# Patient Record
Sex: FEMALE | Race: WHITE | Employment: UNEMPLOYED | URBAN - METROPOLITAN AREA
[De-identification: names, ages, dates, MRNs, and addresses within clinical notes are randomized per-mention and may not be internally consistent; named-entity substitution may affect disease eponyms.]

---

## 2018-10-12 ENCOUNTER — HOSPITAL ENCOUNTER (OUTPATIENT)
Age: 58
Setting detail: SPECIMEN
Discharge: HOME | End: 2018-10-12

## 2018-10-12 LAB
ALBUMIN SERPL-MCNC: 3.1 G/DL (ref 3.5–5.2)
ALBUMIN/GLOBULIN RATIO: 1 (ref 1–2.5)
ALP BLD-CCNC: 93 U/L (ref 35–104)
ALT SERPL-CCNC: 23 U/L (ref 5–33)
AMMONIA: 45 UMOL/L (ref 11–51)
ANION GAP SERPL CALCULATED.3IONS-SCNC: 14 MMOL/L (ref 9–17)
AST SERPL-CCNC: 22 U/L
BILIRUB SERPL-MCNC: 0.4 MG/DL (ref 0.3–1.2)
BILIRUBIN DIRECT: 0.12 MG/DL
BILIRUBIN, INDIRECT: 0.28 MG/DL (ref 0–1)
BUN BLDV-MCNC: 20 MG/DL (ref 8–23)
BUN/CREAT BLD: ABNORMAL (ref 9–20)
CALCIUM SERPL-MCNC: 8.5 MG/DL (ref 8.6–10.4)
CHLORIDE BLD-SCNC: 109 MMOL/L (ref 98–107)
CO2: 21 MMOL/L (ref 20–31)
CREAT SERPL-MCNC: 1.13 MG/DL (ref 0.5–0.9)
GFR AFRICAN AMERICAN: 60 ML/MIN
GFR NON-AFRICAN AMERICAN: 49 ML/MIN
GFR SERPL CREATININE-BSD FRML MDRD: ABNORMAL ML/MIN/{1.73_M2}
GFR SERPL CREATININE-BSD FRML MDRD: ABNORMAL ML/MIN/{1.73_M2}
GLOBULIN: ABNORMAL G/DL (ref 1.5–3.8)
GLUCOSE BLD-MCNC: 200 MG/DL (ref 70–99)
HCT VFR BLD CALC: 29.6 % (ref 36.3–47.1)
HEMOGLOBIN: 9.6 G/DL (ref 11.9–15.1)
MCH RBC QN AUTO: 32.4 PG (ref 25.2–33.5)
MCHC RBC AUTO-ENTMCNC: 32.4 G/DL (ref 28.4–34.8)
MCV RBC AUTO: 100 FL (ref 82.6–102.9)
NRBC AUTOMATED: 0 PER 100 WBC
PDW BLD-RTO: 13.6 % (ref 11.8–14.4)
PLATELET # BLD: 99 K/UL (ref 138–453)
PMV BLD AUTO: 10.5 FL (ref 8.1–13.5)
POTASSIUM SERPL-SCNC: 4 MMOL/L (ref 3.7–5.3)
PREALBUMIN: 10.4 MG/DL (ref 20–40)
RBC # BLD: 2.96 M/UL (ref 3.95–5.11)
SODIUM BLD-SCNC: 144 MMOL/L (ref 135–144)
T4 TOTAL: 5.5 UG/DL (ref 4.5–12)
TOTAL PROTEIN: 6.1 G/DL (ref 6.4–8.3)
TSH SERPL DL<=0.05 MIU/L-ACNC: 32.96 MIU/L (ref 0.3–5)
VITAMIN D 25-HYDROXY: 37.5 NG/ML (ref 30–100)
WBC # BLD: 4.9 K/UL (ref 3.5–11.3)

## 2018-10-12 PROCEDURE — 82140 ASSAY OF AMMONIA: CPT

## 2018-10-12 PROCEDURE — 36415 COLL VENOUS BLD VENIPUNCTURE: CPT

## 2018-10-12 PROCEDURE — 84443 ASSAY THYROID STIM HORMONE: CPT

## 2018-10-12 PROCEDURE — 85027 COMPLETE CBC AUTOMATED: CPT

## 2018-10-12 PROCEDURE — 84436 ASSAY OF TOTAL THYROXINE: CPT

## 2018-10-12 PROCEDURE — 82306 VITAMIN D 25 HYDROXY: CPT

## 2018-10-12 PROCEDURE — 84134 ASSAY OF PREALBUMIN: CPT

## 2018-10-12 PROCEDURE — 80076 HEPATIC FUNCTION PANEL: CPT

## 2018-10-12 PROCEDURE — P9603 ONE-WAY ALLOW PRORATED MILES: HCPCS

## 2018-10-12 PROCEDURE — 80048 BASIC METABOLIC PNL TOTAL CA: CPT

## 2018-11-01 ENCOUNTER — HOSPITAL ENCOUNTER (OUTPATIENT)
Age: 58
Setting detail: SPECIMEN
Discharge: HOME | End: 2018-11-01

## 2019-06-18 ENCOUNTER — HOSPITAL ENCOUNTER (OUTPATIENT)
Age: 59
Setting detail: SPECIMEN
Discharge: HOME | End: 2019-06-18
Payer: MEDICAID

## 2019-06-18 LAB
ALBUMIN SERPL-MCNC: 4 G/DL (ref 3.5–5.2)
ALBUMIN/GLOBULIN RATIO: 1.1 (ref 1–2.5)
ALP BLD-CCNC: 139 U/L (ref 35–104)
ALT SERPL-CCNC: 17 U/L (ref 5–33)
ANION GAP SERPL CALCULATED.3IONS-SCNC: 16 MMOL/L (ref 9–17)
AST SERPL-CCNC: 18 U/L
BILIRUB SERPL-MCNC: 0.89 MG/DL (ref 0.3–1.2)
BUN BLDV-MCNC: 26 MG/DL (ref 8–23)
BUN/CREAT BLD: ABNORMAL (ref 9–20)
CALCIUM SERPL-MCNC: 8.9 MG/DL (ref 8.6–10.4)
CHLORIDE BLD-SCNC: 104 MMOL/L (ref 98–107)
CHOLESTEROL/HDL RATIO: 4
CHOLESTEROL: 157 MG/DL
CO2: 20 MMOL/L (ref 20–31)
CREAT SERPL-MCNC: 1.44 MG/DL (ref 0.5–0.9)
GFR AFRICAN AMERICAN: 45 ML/MIN
GFR NON-AFRICAN AMERICAN: 37 ML/MIN
GFR SERPL CREATININE-BSD FRML MDRD: ABNORMAL ML/MIN/{1.73_M2}
GFR SERPL CREATININE-BSD FRML MDRD: ABNORMAL ML/MIN/{1.73_M2}
GLUCOSE BLD-MCNC: 292 MG/DL (ref 70–99)
HDLC SERPL-MCNC: 39 MG/DL
LDL CHOLESTEROL: 98 MG/DL (ref 0–130)
POTASSIUM SERPL-SCNC: 4.3 MMOL/L (ref 3.7–5.3)
SODIUM BLD-SCNC: 140 MMOL/L (ref 135–144)
THYROXINE, FREE: 0.92 NG/DL (ref 0.93–1.7)
TOTAL PROTEIN: 7.7 G/DL (ref 6.4–8.3)
TRIGL SERPL-MCNC: 101 MG/DL
TSH SERPL DL<=0.05 MIU/L-ACNC: 54.23 MIU/L (ref 0.3–5)
VLDLC SERPL CALC-MCNC: ABNORMAL MG/DL (ref 1–30)

## 2019-06-19 LAB
ABSOLUTE EOS #: 0.12 K/UL (ref 0–0.4)
ABSOLUTE IMMATURE GRANULOCYTE: 0 K/UL (ref 0–0.3)
ABSOLUTE LYMPH #: 0.37 K/UL (ref 1–4.8)
ABSOLUTE MONO #: 0.62 K/UL (ref 0.1–0.8)
BASOPHILS # BLD: 0 % (ref 0–2)
BASOPHILS ABSOLUTE: 0 K/UL (ref 0–0.2)
DIFFERENTIAL TYPE: ABNORMAL
EOSINOPHILS RELATIVE PERCENT: 2 % (ref 1–4)
ESTIMATED AVERAGE GLUCOSE: 240 MG/DL
HBA1C MFR BLD: 10 % (ref 4–6)
HCT VFR BLD CALC: 39.2 % (ref 36.3–47.1)
HEMOGLOBIN: 12.2 G/DL (ref 11.9–15.1)
IMMATURE GRANULOCYTES: 0 %
LYMPHOCYTES # BLD: 6 % (ref 24–44)
MCH RBC QN AUTO: 31.8 PG (ref 25.2–33.5)
MCHC RBC AUTO-ENTMCNC: 31.1 G/DL (ref 28.4–34.8)
MCV RBC AUTO: 102.1 FL (ref 82.6–102.9)
MONOCYTES # BLD: 10 % (ref 1–7)
MORPHOLOGY: ABNORMAL
NRBC AUTOMATED: 0 PER 100 WBC
PDW BLD-RTO: 14.6 % (ref 11.8–14.4)
PLATELET # BLD: 108 K/UL (ref 138–453)
PLATELET ESTIMATE: ABNORMAL
PMV BLD AUTO: 10.9 FL (ref 8.1–13.5)
RBC # BLD: 3.84 M/UL (ref 3.95–5.11)
RBC # BLD: ABNORMAL 10*6/UL
SEG NEUTROPHILS: 82 % (ref 36–66)
SEGMENTED NEUTROPHILS ABSOLUTE COUNT: 5.09 K/UL (ref 1.8–7.7)
WBC # BLD: 6.2 K/UL (ref 3.5–11.3)
WBC # BLD: ABNORMAL 10*3/UL

## 2019-06-26 PROBLEM — K70.31 ALCOHOLIC CIRRHOSIS OF LIVER WITH ASCITES (HCC): Status: ACTIVE | Noted: 2019-05-16

## 2019-06-26 PROBLEM — E78.2 MIXED HYPERLIPIDEMIA: Status: ACTIVE | Noted: 2019-05-16

## 2019-06-26 PROBLEM — N18.30 STAGE 3 CHRONIC KIDNEY DISEASE (HCC): Status: ACTIVE | Noted: 2019-05-16

## 2019-06-26 PROBLEM — E11.22 TYPE 2 DIABETES MELLITUS WITH STAGE 3 CHRONIC KIDNEY DISEASE, WITHOUT LONG-TERM CURRENT USE OF INSULIN (HCC): Status: ACTIVE | Noted: 2019-05-16

## 2019-06-26 PROBLEM — Z72.0 TOBACCO ABUSE: Status: ACTIVE | Noted: 2019-05-16

## 2019-06-26 PROBLEM — E03.9 ACQUIRED HYPOTHYROIDISM: Status: ACTIVE | Noted: 2019-05-16

## 2019-06-26 PROBLEM — J44.9 COPD (CHRONIC OBSTRUCTIVE PULMONARY DISEASE) (HCC): Status: ACTIVE | Noted: 2019-05-16

## 2019-06-26 PROBLEM — N18.30 TYPE 2 DIABETES MELLITUS WITH STAGE 3 CHRONIC KIDNEY DISEASE, WITHOUT LONG-TERM CURRENT USE OF INSULIN (HCC): Status: ACTIVE | Noted: 2019-05-16

## 2019-07-01 RX ORDER — SIMVASTATIN 40 MG
40 TABLET ORAL NIGHTLY
COMMUNITY
Start: 2019-05-17 | End: 2019-11-22

## 2019-07-01 RX ORDER — LEVOTHYROXINE SODIUM 175 UG/1
175 TABLET ORAL DAILY
Status: ON HOLD | COMMUNITY
End: 2019-08-29 | Stop reason: HOSPADM

## 2019-07-01 RX ORDER — PANTOPRAZOLE SODIUM 40 MG/1
40 TABLET, DELAYED RELEASE ORAL DAILY
COMMUNITY
End: 2019-07-03

## 2019-07-01 RX ORDER — LISINOPRIL 2.5 MG/1
2.5 TABLET ORAL PRN
COMMUNITY
Start: 2019-05-17 | End: 2019-07-03

## 2019-07-01 RX ORDER — GLIMEPIRIDE 1 MG/1
2 TABLET ORAL 2 TIMES DAILY
Status: ON HOLD | COMMUNITY
Start: 2019-05-17 | End: 2019-09-17 | Stop reason: DRUGHIGH

## 2019-07-03 ENCOUNTER — OFFICE VISIT (OUTPATIENT)
Dept: NEPHROLOGY | Age: 59
End: 2019-07-03
Payer: MEDICAID

## 2019-07-03 VITALS
DIASTOLIC BLOOD PRESSURE: 68 MMHG | WEIGHT: 242 LBS | HEART RATE: 89 BPM | SYSTOLIC BLOOD PRESSURE: 102 MMHG | OXYGEN SATURATION: 99 %

## 2019-07-03 DIAGNOSIS — E11.21 DIABETIC NEPHROPATHY WITH PROTEINURIA (HCC): ICD-10-CM

## 2019-07-03 DIAGNOSIS — N18.30 CKD (CHRONIC KIDNEY DISEASE) STAGE 3, GFR 30-59 ML/MIN (HCC): ICD-10-CM

## 2019-07-03 DIAGNOSIS — I10 HTN (HYPERTENSION), BENIGN: Primary | ICD-10-CM

## 2019-07-03 PROCEDURE — 4004F PT TOBACCO SCREEN RCVD TLK: CPT | Performed by: INTERNAL MEDICINE

## 2019-07-03 PROCEDURE — 2022F DILAT RTA XM EVC RTNOPTHY: CPT | Performed by: INTERNAL MEDICINE

## 2019-07-03 PROCEDURE — G8428 CUR MEDS NOT DOCUMENT: HCPCS | Performed by: INTERNAL MEDICINE

## 2019-07-03 PROCEDURE — 3017F COLORECTAL CA SCREEN DOC REV: CPT | Performed by: INTERNAL MEDICINE

## 2019-07-03 PROCEDURE — 99204 OFFICE O/P NEW MOD 45 MIN: CPT | Performed by: INTERNAL MEDICINE

## 2019-07-03 PROCEDURE — 3046F HEMOGLOBIN A1C LEVEL >9.0%: CPT | Performed by: INTERNAL MEDICINE

## 2019-07-03 PROCEDURE — G8421 BMI NOT CALCULATED: HCPCS | Performed by: INTERNAL MEDICINE

## 2019-07-03 RX ORDER — BACLOFEN 20 MG/1
20 TABLET ORAL NIGHTLY
Status: ON HOLD | COMMUNITY
End: 2019-08-05 | Stop reason: HOSPADM

## 2019-07-03 ASSESSMENT — ENCOUNTER SYMPTOMS
DIARRHEA: 0
COUGH: 0
EYE PAIN: 0
EYES NEGATIVE: 1
ABDOMINAL PAIN: 0
SORE THROAT: 0
BACK PAIN: 0
SHORTNESS OF BREATH: 0
VOMITING: 0
NAUSEA: 0

## 2019-07-03 NOTE — PROGRESS NOTES
[facility]          [facility]        [address]        [facility]        [phone]           Outpatient Initial consult note         7/3/2019 11:51 AM    Patient Name:   Cam Marino:    1960  Primary Care Physician:  Rigo Morton PA-C     Chief Complaint : Evaluation of  CKD Management     History of presenting illness :Renato Stoddard is a 59 y.o.   female with Past Medical History as stated below was sent / referred by Rigo Morton PA-C forevaluation of the above problem. Patient has no history of hypertension for 0 years. Patient has history of diabetes mellitus, with retinopathy & neuropathy and for 35 years. The patient denies history of renal stones anddenies NSAID use. Patient has no history of proteinuria. Patient Never had a kidney biopsy done. Patient does not use Herbal remedies/vitamin supplements. Patient denies frequency, hematuria, hesitancy. Patient has no family history of kidney disease. Patient's chronic medical conditions of chronic kidney disease, cirrhosis, diabetic neuropathy are stable and well controlled with medications at this time    Patient has a history of chronic kidney disease stage III and used to see a nephrologist in [facility] now patient has moved here and establishing care with me.      Past History :     Past Medical History:   Diagnosis Date    Chronic kidney disease     Diabetes mellitus ([address]     Heart abnormality     Hypertension      Past Surgical History:   Procedure Laterality Date     SECTION      HYSTERECTOMY       Social History     Socioeconomic History    Marital status: Unknown     Spouse name: Not on file    Number of children: Not on file    Years of education: Not on file    Highest education level: Not on file   Occupational History    Not on file   Social Needs    Financial resource strain: Not on file    Food insecurity:     Worry: Microalbumin / Creatinine Urine Ratio    Protein / creatinine ratio, urine       Wiliam Mcdaniels M.D  [facility].

## 2019-08-01 ENCOUNTER — HOSPITAL ENCOUNTER (INPATIENT)
Age: 59
LOS: 4 days | Discharge: HOME | DRG: 280 | End: 2019-08-05
Attending: HOSPITALIST | Admitting: HOSPITALIST
Payer: MEDICAID

## 2019-08-01 DIAGNOSIS — K70.31 ALCOHOLIC CIRRHOSIS OF LIVER WITH ASCITES (HCC): Primary | ICD-10-CM

## 2019-08-01 PROBLEM — R18.8 ASCITES: Status: ACTIVE | Noted: 2019-08-01

## 2019-08-01 PROCEDURE — 1200000000 HC SEMI PRIVATE

## 2019-08-01 ASSESSMENT — PAIN DESCRIPTION - DESCRIPTORS: DESCRIPTORS: DULL

## 2019-08-01 ASSESSMENT — PAIN SCALES - GENERAL: PAINLEVEL_OUTOF10: 6

## 2019-08-01 ASSESSMENT — PAIN DESCRIPTION - ONSET: ONSET: ON-GOING

## 2019-08-01 ASSESSMENT — PAIN DESCRIPTION - LOCATION: LOCATION: ABDOMEN

## 2019-08-01 ASSESSMENT — PAIN DESCRIPTION - PAIN TYPE: TYPE: ACUTE PAIN

## 2019-08-01 ASSESSMENT — PAIN - FUNCTIONAL ASSESSMENT: PAIN_FUNCTIONAL_ASSESSMENT: PREVENTS OR INTERFERES SOME ACTIVE ACTIVITIES AND ADLS

## 2019-08-01 ASSESSMENT — PAIN DESCRIPTION - DIRECTION: RADIATING_TOWARDS: UPPER ABDOMEN

## 2019-08-01 ASSESSMENT — PAIN DESCRIPTION - FREQUENCY: FREQUENCY: INTERMITTENT

## 2019-08-01 ASSESSMENT — PAIN DESCRIPTION - ORIENTATION: ORIENTATION: LEFT

## 2019-08-02 ENCOUNTER — APPOINTMENT (OUTPATIENT)
Dept: ULTRASOUND IMAGING | Age: 59
DRG: 280 | End: 2019-08-02
Attending: HOSPITALIST
Payer: MEDICAID

## 2019-08-02 LAB
ABSOLUTE RETIC #: 83 THOU/MM3 (ref 20–115)
AFP-TUMOR MARKER: 2.9 UG/L
ALBUMIN FLUID: 1.3 GM/DL
ALBUMIN SERPL-MCNC: 3.1 G/DL (ref 3.5–5.1)
ALBUMIN SERPL-MCNC: 3.2 G/DL (ref 3.5–5.1)
ALP BLD-CCNC: 102 U/L (ref 38–126)
ALP BLD-CCNC: 99 U/L (ref 38–126)
ALT SERPL-CCNC: 8 U/L (ref 11–66)
ALT SERPL-CCNC: 9 U/L (ref 11–66)
AMYLASE FLUID: 9 U/L
ANION GAP SERPL CALCULATED.3IONS-SCNC: 11 MEQ/L (ref 8–16)
ANION GAP SERPL CALCULATED.3IONS-SCNC: 12 MEQ/L (ref 8–16)
APTT: 30.6 SECONDS (ref 22–38)
AST SERPL-CCNC: 11 U/L (ref 5–40)
AST SERPL-CCNC: 11 U/L (ref 5–40)
BACTERIA: ABNORMAL /HPF
BASOPHILS # BLD: 0.7 %
BASOPHILS # BLD: 0.7 %
BASOPHILS ABSOLUTE: 0 THOU/MM3 (ref 0–0.1)
BASOPHILS ABSOLUTE: 0 THOU/MM3 (ref 0–0.1)
BILIRUB SERPL-MCNC: 0.5 MG/DL (ref 0.3–1.2)
BILIRUB SERPL-MCNC: 0.6 MG/DL (ref 0.3–1.2)
BILIRUBIN URINE: NEGATIVE
BLOOD, URINE: NEGATIVE
BODY FLUID RBC: < 2000 /CUMM
BUN BLDV-MCNC: 24 MG/DL (ref 7–22)
BUN BLDV-MCNC: 24 MG/DL (ref 7–22)
CALCIUM SERPL-MCNC: 8.7 MG/DL (ref 8.5–10.5)
CALCIUM SERPL-MCNC: 8.7 MG/DL (ref 8.5–10.5)
CASTS 2: ABNORMAL /LPF
CASTS UA: ABNORMAL /LPF
CHARACTER, BODY FLUID: NORMAL
CHARACTER, URINE: ABNORMAL
CHLORIDE BLD-SCNC: 105 MEQ/L (ref 98–111)
CHLORIDE BLD-SCNC: 107 MEQ/L (ref 98–111)
CO2: 22 MEQ/L (ref 23–33)
CO2: 22 MEQ/L (ref 23–33)
COLOR: ABNORMAL
COLOR: YELLOW
CREAT SERPL-MCNC: 1.4 MG/DL (ref 0.4–1.2)
CREAT SERPL-MCNC: 1.6 MG/DL (ref 0.4–1.2)
CRYSTALS, UA: ABNORMAL
EOSINOPHIL # BLD: 0 %
EOSINOPHIL # BLD: 1.2 %
EOSINOPHILS ABSOLUTE: 0 THOU/MM3 (ref 0–0.4)
EOSINOPHILS ABSOLUTE: 0.1 THOU/MM3 (ref 0–0.4)
EPITHELIAL CELLS, UA: ABNORMAL /HPF
ERYTHROCYTE [DISTWIDTH] IN BLOOD BY AUTOMATED COUNT: 14.1 % (ref 11.5–14.5)
ERYTHROCYTE [DISTWIDTH] IN BLOOD BY AUTOMATED COUNT: 14.1 % (ref 11.5–14.5)
ERYTHROCYTE [DISTWIDTH] IN BLOOD BY AUTOMATED COUNT: 52.6 FL (ref 35–45)
ERYTHROCYTE [DISTWIDTH] IN BLOOD BY AUTOMATED COUNT: 52.9 FL (ref 35–45)
FERRITIN: 81 NG/ML (ref 10–291)
FOLATE: 9.5 NG/ML (ref 4.8–24.2)
GFR SERPL CREATININE-BSD FRML MDRD: 33 ML/MIN/1.73M2
GFR SERPL CREATININE-BSD FRML MDRD: 38 ML/MIN/1.73M2
GLUCOSE BLD-MCNC: 201 MG/DL (ref 70–108)
GLUCOSE BLD-MCNC: 211 MG/DL (ref 70–108)
GLUCOSE URINE: NEGATIVE MG/DL
GLUCOSE, FLUID: 221 MG/DL
HAV IGM SER IA-ACNC: NEGATIVE
HCT VFR BLD CALC: 33.3 % (ref 37–47)
HCT VFR BLD CALC: 33.7 % (ref 37–47)
HEMOGLOBIN: 10.6 GM/DL (ref 12–16)
HEMOGLOBIN: 10.7 GM/DL (ref 12–16)
HEPATITIS B CORE IGM ANTIBODY: NEGATIVE
HEPATITIS B SURFACE ANTIGEN: NEGATIVE
HEPATITIS C ANTIBODY: NEGATIVE
IGG: 1487 MG/DL (ref 700–1600)
IMMATURE GRANS (ABS): 0.01 THOU/MM3 (ref 0–0.07)
IMMATURE GRANS (ABS): 0.01 THOU/MM3 (ref 0–0.07)
IMMATURE GRANULOCYTES: 0.2 %
IMMATURE GRANULOCYTES: 0.2 %
IMMATURE RETIC FRACT: 15.1 % (ref 3–15.9)
INR BLD: 1.18 (ref 0.85–1.13)
IRON SATURATION: 28 % (ref 20–50)
IRON: 56 UG/DL (ref 50–170)
KETONES, URINE: NEGATIVE
LD, FLUID: 79 U/L
LEUKOCYTE ESTERASE, URINE: ABNORMAL
LYMPHOCYTES # BLD: 10.4 %
LYMPHOCYTES # BLD: 9.8 %
LYMPHOCYTES ABSOLUTE: 0.4 THOU/MM3 (ref 1–4.8)
LYMPHOCYTES ABSOLUTE: 0.5 THOU/MM3 (ref 1–4.8)
MCH RBC QN AUTO: 32.4 PG (ref 26–33)
MCH RBC QN AUTO: 32.6 PG (ref 26–33)
MCHC RBC AUTO-ENTMCNC: 31.8 GM/DL (ref 32.2–35.5)
MCHC RBC AUTO-ENTMCNC: 31.8 GM/DL (ref 32.2–35.5)
MCV RBC AUTO: 102.1 FL (ref 81–99)
MCV RBC AUTO: 102.5 FL (ref 81–99)
MISCELLANEOUS 2: ABNORMAL
MONOCYTES # BLD: 10.5 %
MONOCYTES # BLD: 9.7 %
MONOCYTES ABSOLUTE: 0.4 THOU/MM3 (ref 0.4–1.3)
MONOCYTES ABSOLUTE: 0.5 THOU/MM3 (ref 0.4–1.3)
MONONUCLEAR CELLS BODY FLUID: 89.7 %
NITRITE, URINE: POSITIVE
NUCLEATED RED BLOOD CELLS: 0 /100 WBC
NUCLEATED RED BLOOD CELLS: 0 /100 WBC
PATHOLOGIST REVIEW: NORMAL
PH UA: 8.5 (ref 5–9)
PLATELET # BLD: 86 THOU/MM3 (ref 130–400)
PLATELET # BLD: 90 THOU/MM3 (ref 130–400)
PMV BLD AUTO: 9.8 FL (ref 9.4–12.4)
PMV BLD AUTO: 9.8 FL (ref 9.4–12.4)
POLYMORPHONUCLEAR CELLS BODY FLUID: 10.3 %
POTASSIUM REFLEX MAGNESIUM: 4.3 MEQ/L (ref 3.5–5.2)
POTASSIUM SERPL-SCNC: 4.2 MEQ/L (ref 3.5–5.2)
PROTEIN FLUID: 2.3 GM/DL
PROTEIN UA: 100
RBC # BLD: 3.25 MILL/MM3 (ref 4.2–5.4)
RBC # BLD: 3.3 MILL/MM3 (ref 4.2–5.4)
RBC URINE: ABNORMAL /HPF
RENAL EPITHELIAL, UA: ABNORMAL
RETIC HEMOGLOBIN: 36.7 PG (ref 28.2–35.7)
RETICULOCYTE ABSOLUTE COUNT: 2.5 % (ref 0.5–2)
SCAN OF BLOOD SMEAR: NORMAL
SEG NEUTROPHILS: 77.8 %
SEG NEUTROPHILS: 78.8 %
SEGMENTED NEUTROPHILS ABSOLUTE COUNT: 3.3 THOU/MM3 (ref 1.8–7.7)
SEGMENTED NEUTROPHILS ABSOLUTE COUNT: 3.6 THOU/MM3 (ref 1.8–7.7)
SODIUM BLD-SCNC: 138 MEQ/L (ref 135–145)
SODIUM BLD-SCNC: 141 MEQ/L (ref 135–145)
SPECIFIC GRAVITY, URINE: 1.02 (ref 1–1.03)
SPECIMEN: NORMAL
TOTAL IRON BINDING CAPACITY: 199 UG/DL (ref 171–450)
TOTAL NUCLEATED CELLS BODY FLUID: 107 /CUMM (ref 0–500)
TOTAL PROTEIN: 6.4 G/DL (ref 6.1–8)
TOTAL PROTEIN: 6.6 G/DL (ref 6.1–8)
TOTAL VOLUME RECEIVED BODY FLUID: 80 ML
UROBILINOGEN, URINE: 1 EU/DL (ref 0–1)
VITAMIN B-12: 767 PG/ML (ref 211–911)
WBC # BLD: 4.2 THOU/MM3 (ref 4.8–10.8)
WBC # BLD: 4.6 THOU/MM3 (ref 4.8–10.8)
WBC UA: > 100 /HPF
YEAST: ABNORMAL

## 2019-08-02 PROCEDURE — 6370000000 HC RX 637 (ALT 250 FOR IP): Performed by: PHYSICIAN ASSISTANT

## 2019-08-02 PROCEDURE — 87086 URINE CULTURE/COLONY COUNT: CPT

## 2019-08-02 PROCEDURE — 99223 1ST HOSP IP/OBS HIGH 75: CPT | Performed by: PHYSICIAN ASSISTANT

## 2019-08-02 PROCEDURE — 85046 RETICYTE/HGB CONCENTRATE: CPT

## 2019-08-02 PROCEDURE — 82607 VITAMIN B-12: CPT

## 2019-08-02 PROCEDURE — 89050 BODY FLUID CELL COUNT: CPT

## 2019-08-02 PROCEDURE — 83516 IMMUNOASSAY NONANTIBODY: CPT

## 2019-08-02 PROCEDURE — 76705 ECHO EXAM OF ABDOMEN: CPT

## 2019-08-02 PROCEDURE — 82784 ASSAY IGA/IGD/IGG/IGM EACH: CPT

## 2019-08-02 PROCEDURE — 86038 ANTINUCLEAR ANTIBODIES: CPT

## 2019-08-02 PROCEDURE — 49083 ABD PARACENTESIS W/IMAGING: CPT

## 2019-08-02 PROCEDURE — 88305 TISSUE EXAM BY PATHOLOGIST: CPT

## 2019-08-02 PROCEDURE — 82945 GLUCOSE OTHER FLUID: CPT

## 2019-08-02 PROCEDURE — 0W9G3ZZ DRAINAGE OF PERITONEAL CAVITY, PERCUTANEOUS APPROACH: ICD-10-PCS | Performed by: RADIOLOGY

## 2019-08-02 PROCEDURE — 82390 ASSAY OF CERULOPLASMIN: CPT

## 2019-08-02 PROCEDURE — 80074 ACUTE HEPATITIS PANEL: CPT

## 2019-08-02 PROCEDURE — 82042 OTHER SOURCE ALBUMIN QUAN EA: CPT

## 2019-08-02 PROCEDURE — 87186 SC STD MICRODIL/AGAR DIL: CPT

## 2019-08-02 PROCEDURE — 87075 CULTR BACTERIA EXCEPT BLOOD: CPT

## 2019-08-02 PROCEDURE — 82105 ALPHA-FETOPROTEIN SERUM: CPT

## 2019-08-02 PROCEDURE — 83615 LACTATE (LD) (LDH) ENZYME: CPT

## 2019-08-02 PROCEDURE — 87070 CULTURE OTHR SPECIMN AEROBIC: CPT

## 2019-08-02 PROCEDURE — 85730 THROMBOPLASTIN TIME PARTIAL: CPT

## 2019-08-02 PROCEDURE — 2709999900 HC NON-CHARGEABLE SUPPLY

## 2019-08-02 PROCEDURE — 82746 ASSAY OF FOLIC ACID SERUM: CPT

## 2019-08-02 PROCEDURE — 82728 ASSAY OF FERRITIN: CPT

## 2019-08-02 PROCEDURE — 85610 PROTHROMBIN TIME: CPT

## 2019-08-02 PROCEDURE — 82150 ASSAY OF AMYLASE: CPT

## 2019-08-02 PROCEDURE — 84157 ASSAY OF PROTEIN OTHER: CPT

## 2019-08-02 PROCEDURE — 6360000002 HC RX W HCPCS: Performed by: INTERNAL MEDICINE

## 2019-08-02 PROCEDURE — 2500000003 HC RX 250 WO HCPCS: Performed by: PHYSICIAN ASSISTANT

## 2019-08-02 PROCEDURE — P9047 ALBUMIN (HUMAN), 25%, 50ML: HCPCS | Performed by: INTERNAL MEDICINE

## 2019-08-02 PROCEDURE — 86255 FLUORESCENT ANTIBODY SCREEN: CPT

## 2019-08-02 PROCEDURE — 2580000003 HC RX 258: Performed by: PHYSICIAN ASSISTANT

## 2019-08-02 PROCEDURE — 80053 COMPREHEN METABOLIC PANEL: CPT

## 2019-08-02 PROCEDURE — 87077 CULTURE AEROBIC IDENTIFY: CPT

## 2019-08-02 PROCEDURE — 88112 CYTOPATH CELL ENHANCE TECH: CPT

## 2019-08-02 PROCEDURE — 81001 URINALYSIS AUTO W/SCOPE: CPT

## 2019-08-02 PROCEDURE — 6360000002 HC RX W HCPCS: Performed by: PHYSICIAN ASSISTANT

## 2019-08-02 PROCEDURE — 1200000000 HC SEMI PRIVATE

## 2019-08-02 PROCEDURE — 87205 SMEAR GRAM STAIN: CPT

## 2019-08-02 PROCEDURE — 83550 IRON BINDING TEST: CPT

## 2019-08-02 PROCEDURE — 83540 ASSAY OF IRON: CPT

## 2019-08-02 PROCEDURE — 82103 ALPHA-1-ANTITRYPSIN TOTAL: CPT

## 2019-08-02 PROCEDURE — 36415 COLL VENOUS BLD VENIPUNCTURE: CPT

## 2019-08-02 PROCEDURE — 85025 COMPLETE CBC W/AUTO DIFF WBC: CPT

## 2019-08-02 RX ORDER — LORAZEPAM 2 MG/ML
4 INJECTION INTRAMUSCULAR
Status: DISCONTINUED | OUTPATIENT
Start: 2019-08-02 | End: 2019-08-05 | Stop reason: HOSPADM

## 2019-08-02 RX ORDER — GLIMEPIRIDE 2 MG/1
2 TABLET ORAL 2 TIMES DAILY WITH MEALS
Status: DISCONTINUED | OUTPATIENT
Start: 2019-08-02 | End: 2019-08-03

## 2019-08-02 RX ORDER — SODIUM CHLORIDE 9 MG/ML
INJECTION, SOLUTION INTRAVENOUS CONTINUOUS
Status: DISCONTINUED | OUTPATIENT
Start: 2019-08-02 | End: 2019-08-03

## 2019-08-02 RX ORDER — POTASSIUM CHLORIDE 7.45 MG/ML
10 INJECTION INTRAVENOUS PRN
Status: DISCONTINUED | OUTPATIENT
Start: 2019-08-02 | End: 2019-08-05 | Stop reason: HOSPADM

## 2019-08-02 RX ORDER — LORAZEPAM 2 MG/1
4 TABLET ORAL
Status: DISCONTINUED | OUTPATIENT
Start: 2019-08-02 | End: 2019-08-05 | Stop reason: HOSPADM

## 2019-08-02 RX ORDER — NICOTINE POLACRILEX 4 MG
15 LOZENGE BUCCAL PRN
Status: DISCONTINUED | OUTPATIENT
Start: 2019-08-02 | End: 2019-08-05 | Stop reason: HOSPADM

## 2019-08-02 RX ORDER — ALBUMIN (HUMAN) 12.5 G/50ML
25 SOLUTION INTRAVENOUS ONCE
Status: COMPLETED | OUTPATIENT
Start: 2019-08-02 | End: 2019-08-02

## 2019-08-02 RX ORDER — LORAZEPAM 2 MG/ML
1 INJECTION INTRAMUSCULAR
Status: DISCONTINUED | OUTPATIENT
Start: 2019-08-02 | End: 2019-08-05 | Stop reason: HOSPADM

## 2019-08-02 RX ORDER — LEVOTHYROXINE SODIUM 0.1 MG/1
200 TABLET ORAL
Status: DISCONTINUED | OUTPATIENT
Start: 2019-08-02 | End: 2019-08-05 | Stop reason: HOSPADM

## 2019-08-02 RX ORDER — NICOTINE 21 MG/24HR
1 PATCH, TRANSDERMAL 24 HOURS TRANSDERMAL DAILY
Status: DISCONTINUED | OUTPATIENT
Start: 2019-08-02 | End: 2019-08-05 | Stop reason: HOSPADM

## 2019-08-02 RX ORDER — BACLOFEN 10 MG/1
20 TABLET ORAL NIGHTLY
Status: DISCONTINUED | OUTPATIENT
Start: 2019-08-02 | End: 2019-08-05 | Stop reason: HOSPADM

## 2019-08-02 RX ORDER — FENTANYL CITRATE 50 UG/ML
50 INJECTION, SOLUTION INTRAMUSCULAR; INTRAVENOUS
Status: DISCONTINUED | OUTPATIENT
Start: 2019-08-02 | End: 2019-08-05 | Stop reason: HOSPADM

## 2019-08-02 RX ORDER — LORAZEPAM 2 MG/ML
2 INJECTION INTRAMUSCULAR
Status: DISCONTINUED | OUTPATIENT
Start: 2019-08-02 | End: 2019-08-05 | Stop reason: HOSPADM

## 2019-08-02 RX ORDER — LORAZEPAM 2 MG/ML
3 INJECTION INTRAMUSCULAR
Status: DISCONTINUED | OUTPATIENT
Start: 2019-08-02 | End: 2019-08-05 | Stop reason: HOSPADM

## 2019-08-02 RX ORDER — DEXTROSE MONOHYDRATE 25 G/50ML
12.5 INJECTION, SOLUTION INTRAVENOUS PRN
Status: DISCONTINUED | OUTPATIENT
Start: 2019-08-02 | End: 2019-08-05 | Stop reason: HOSPADM

## 2019-08-02 RX ORDER — LORAZEPAM 1 MG/1
1 TABLET ORAL
Status: DISCONTINUED | OUTPATIENT
Start: 2019-08-02 | End: 2019-08-05 | Stop reason: HOSPADM

## 2019-08-02 RX ORDER — ONDANSETRON 2 MG/ML
4 INJECTION INTRAMUSCULAR; INTRAVENOUS EVERY 6 HOURS PRN
Status: DISCONTINUED | OUTPATIENT
Start: 2019-08-02 | End: 2019-08-05 | Stop reason: HOSPADM

## 2019-08-02 RX ORDER — POTASSIUM CHLORIDE 20 MEQ/1
40 TABLET, EXTENDED RELEASE ORAL PRN
Status: DISCONTINUED | OUTPATIENT
Start: 2019-08-02 | End: 2019-08-05 | Stop reason: HOSPADM

## 2019-08-02 RX ORDER — LORAZEPAM 2 MG/1
2 TABLET ORAL
Status: DISCONTINUED | OUTPATIENT
Start: 2019-08-02 | End: 2019-08-05 | Stop reason: HOSPADM

## 2019-08-02 RX ORDER — SIMVASTATIN 40 MG
40 TABLET ORAL NIGHTLY
Status: DISCONTINUED | OUTPATIENT
Start: 2019-08-02 | End: 2019-08-05 | Stop reason: HOSPADM

## 2019-08-02 RX ORDER — SODIUM CHLORIDE 0.9 % (FLUSH) 0.9 %
10 SYRINGE (ML) INJECTION PRN
Status: DISCONTINUED | OUTPATIENT
Start: 2019-08-02 | End: 2019-08-05 | Stop reason: HOSPADM

## 2019-08-02 RX ORDER — SODIUM CHLORIDE 0.9 % (FLUSH) 0.9 %
10 SYRINGE (ML) INJECTION EVERY 12 HOURS SCHEDULED
Status: DISCONTINUED | OUTPATIENT
Start: 2019-08-02 | End: 2019-08-05 | Stop reason: HOSPADM

## 2019-08-02 RX ORDER — DEXTROSE MONOHYDRATE 50 MG/ML
100 INJECTION, SOLUTION INTRAVENOUS PRN
Status: DISCONTINUED | OUTPATIENT
Start: 2019-08-02 | End: 2019-08-05 | Stop reason: HOSPADM

## 2019-08-02 RX ADMIN — LEVOTHYROXINE SODIUM 200 MCG: 100 TABLET ORAL at 06:00

## 2019-08-02 RX ADMIN — SODIUM CHLORIDE: 9 INJECTION, SOLUTION INTRAVENOUS at 04:07

## 2019-08-02 RX ADMIN — SODIUM CHLORIDE: 9 INJECTION, SOLUTION INTRAVENOUS at 22:10

## 2019-08-02 RX ADMIN — SIMVASTATIN 40 MG: 40 TABLET, FILM COATED ORAL at 20:28

## 2019-08-02 RX ADMIN — GLIMEPIRIDE 2 MG: 2 TABLET ORAL at 07:57

## 2019-08-02 RX ADMIN — ALBUMIN (HUMAN) 25 G: 0.25 INJECTION, SOLUTION INTRAVENOUS at 15:52

## 2019-08-02 RX ADMIN — SERTRALINE 50 MG: 50 TABLET, FILM COATED ORAL at 07:57

## 2019-08-02 RX ADMIN — Medication 10 ML: at 22:11

## 2019-08-02 ASSESSMENT — PAIN SCALES - GENERAL
PAINLEVEL_OUTOF10: 0
PAINLEVEL_OUTOF10: 2
PAINLEVEL_OUTOF10: 0

## 2019-08-02 ASSESSMENT — PAIN DESCRIPTION - FREQUENCY: FREQUENCY: INTERMITTENT

## 2019-08-02 ASSESSMENT — ENCOUNTER SYMPTOMS
VOMITING: 0
ABDOMINAL DISTENTION: 1
ABDOMINAL PAIN: 1
ALLERGIC/IMMUNOLOGIC NEGATIVE: 1
NAUSEA: 0
RESPIRATORY NEGATIVE: 1
EYES NEGATIVE: 1

## 2019-08-02 ASSESSMENT — PAIN DESCRIPTION - ONSET: ONSET: ON-GOING

## 2019-08-02 ASSESSMENT — PAIN DESCRIPTION - PAIN TYPE: TYPE: ACUTE PAIN

## 2019-08-02 ASSESSMENT — PAIN DESCRIPTION - LOCATION: LOCATION: BACK

## 2019-08-02 ASSESSMENT — PAIN DESCRIPTION - ORIENTATION: ORIENTATION: LOWER

## 2019-08-02 ASSESSMENT — PAIN DESCRIPTION - DESCRIPTORS: DESCRIPTORS: ACHING

## 2019-08-02 NOTE — CONSULTS
violence:     Fear of current or ex partner: Not on file     Emotionally abused: Not on file     Physically abused: Not on file     Forced sexual activity: Not on file   Other Topics Concern    Not on file   Social History Narrative    Not on file     Family History:   No GI malignancies    ROS:  GENERAL: 30 lb weight gain. Denies fever, chills. NEURO: Denies numbness or tingling in extremities. Denies headache. Anabella Fredy CARDIOVASCULAR: Dyspnea at rest. Denies chest pain or palpitations. RESPIRATORY: Cough. Denies Hemoptysis. GI: Admits to a single episode of hematochezia 6 weeks ago. +Abdominal pain and distension. Denies constipation or N/V/D. Denies jaundice. : Denies dysuria or hematuria. DERM: Denies rashes or bruises    Physical Exam:  VITALS:   /70   Pulse 82   Temp 97.3 °F (36.3 °C) (Oral)   Resp 20   Ht 5' 4\" (1.626 m)   Wt 262 lb 3.2 oz (118.9 kg)   SpO2 95%   BMI 45.01 kg/m²      The patient is a 62 y.o.  female with Body mass index is 45.01 kg/m². in no acute distress. HEAD: Normocephalic. Atraumatic. NECK: Thyroid nonpalpable. Neck is nontender with no signs of lymphadenopathy. CHEST: End expiratory wheezing bilaterally. No rales or rhonchi. CARDIOVASCULAR: RRR. No murmurs, gallops, or rubs. No JVD   ABDOMEN: Distended with palpable spleen. Tender and firm abdomen. BS present x 4. DERM: No jaundice, bruising, pallor, or rashes  NEURO: Cranial nerves are grossly intact. Alert and oriented to person, place, and time. Assessment:  1. Ascites   2. Macrocytic Anemia  3. Hypoalbuminemia  4. CKD Stage III    Plan:  1. Abdominal Paracentesis (ultrasound guided)  2. AFP to look for evidence of malignancy  3. Ultrasound of liver to look for thrombus or tumor  3. Iron study, B12, and folate   4. U/A        Electronically signed by Niurka Burgess on 8/2/2019 at 10:17 AM    **This is a Medical/ PA/ APRN Student Note and is charted for educational purposes.  The non-physician

## 2019-08-02 NOTE — BRIEF OP NOTE
Post Procedure Progress Note    8/2/2019  Pre-Procedure Diagnosis: Ascites  Post-Procedure Diagnosis: Same  Physician: Selina Cho MD  Anesthesia: 2% lidocaine local  Procedure Performed: Ultrasound guided paracentesis  Specimen Removed: 5 liters clear yellow ascites  Disposition of Specimen: 80 ml specimen sent to the lab  Estimated Blood Loss: None  Complications: None

## 2019-08-02 NOTE — CONSULTS
Pt Name: Mick Li  MRN: [id number]  [id number]  YOB: 1960  Admit Date: 2019 10:58 PM  Date of evaluation: 2019  Primary Care Physician: Trung Delong PA-C   [id number]     Requesting Provider:  Mor PIERCE Consult    Indication:  Ascites     History:  The patient is a 62 y.o.  female admitted 19 for ascites and dyspnea. She has alcoholic cirrhosis. [facility] same earlier this in March or April. She denies ever having paracentesis or being on diuretics. She does not follow with a physician regularly. Location:  ascites  Duration:  6 months  Radiation:  none  Associated:  abd pain  Mitigating factors:  none  Exacerbating factors:  cirrhosis    Last EGD:  never  Last Colonoscopy:  never    Past Medical History:        Diagnosis Date    Chronic kidney disease     Diabetes mellitus ([address]     Heart abnormality     Hypertension      Past Surgical History:        Procedure Laterality Date     SECTION      HYSTERECTOMY       Allergies:  Hydrocodone-acetaminophen; Pentazocine; Rofecoxib; and Sulfamethoxazole-trimethoprim  Home Meds:  Prior to Admission medications    Medication Sig Start Date End Date Taking?  Authorizing Provider   baclofen (LIORESAL) 20 MG tablet Take 20 mg by mouth nightly    Historical Provider, MD   levothyroxine (SYNTHROID) 175 MCG tablet Take 175 mcg by mouth daily    Historical Provider, MD   sertraline (ZOLOFT) 50 MG tablet Take 50 mg by mouth daily 19   Historical Provider, MD   simvastatin (ZOCOR) 40 MG tablet Take 40 mg by mouth nightly 19   Historical Provider, MD   glimepiride (AMARYL) 1 MG tablet Take 2 mg by mouth 2 times daily 19   Historical Provider, MD      Current Meds:     Current Facility-Administered Medications   Medication Dose Route Frequency Provider Last Rate Last Dose    baclofen (LIORESAL) tablet 20 mg  20 mg Oral Nightly Tiffanie Villanueva PA-C        glimepiride (AMARYL) tablet 2 mg  2 mg Oral BID WC MILTON OreillyC   2 mg at 08/02/19 0757    levothyroxine (SYNTHROID) tablet 200 mcg  200 mcg Oral QAM AC Paty Jose MILTON RodC   200 mcg at 08/02/19 0600    sertraline (ZOLOFT) tablet 50 mg  50 mg Oral Daily HONORIO Oreilly-C   50 mg at 08/02/19 0757    simvastatin (ZOCOR) tablet 40 mg  40 mg Oral Nightly Paty R CHETAN Sanford        glucose (GLUTOSE) 40 % oral gel 15 g  15 g Oral PRN Polo Mckoy PA-C        dextrose 50 % IV solution  12.5 g Intravenous PRN HONORIO Oreilly-C        glucagon (rDNA) injection 1 mg  1 mg Intramuscular PRN Polo Mckoy, PA-C        dextrose 5 % solution  100 mL/hr Intravenous PRN Polo Mckoy PA-C        sodium chloride flush 0.9 % injection 10 mL  10 mL Intravenous 2 times per day Polo Mckoy PA-C        sodium chloride flush 0.9 % injection 10 mL  10 mL Intravenous PRN Polo Mckoy PA-C        magnesium hydroxide (MILK OF MAGNESIA) 400 MG/5ML suspension 30 mL  30 mL Oral Daily PRN Polo Mckoy PA-C        ondansetron (ZOFRAN) injection 4 mg  4 mg Intravenous Q6H PRN Polo Mckoy PA-C        0.9 % sodium chloride infusion   Intravenous Continuous Polo Mckoy PA-C 75 mL/hr at 08/02/19 0407      potassium chloride (KLOR-CON M) extended release tablet 40 mEq  40 mEq Oral PRN Polo Mckoy PA-C        Or    potassium bicarb-citric acid (EFFER-K) effervescent tablet 40 mEq  40 mEq Oral PRN Polo Mckoy PA-C        Or    potassium chloride 10 mEq/100 mL IVPB (Peripheral Line)  10 mEq Intravenous PRN Polo Mckoy PA-C        magnesium sulfate 1 g in dextrose 5 % 100 mL IVPB  1 g Intravenous PRN Paty ASHANTI Goode PA-C        nicotine (NICODERM CQ) 21 MG/24HR 1 patch  1 patch Transdermal Daily Paty R CHETAN Sanford        LORazepam (ATIVAN) tablet 1 mg  1 mg Oral Q1H PRN HONORIO Oreilly-C        Or    LORazepam (ATIVAN) injection 1 mg  1 mg Intravenous Q1H PRN Polo Mckoy PA-C        Or   No [employer] club or organization: Not on file     Attends meetings of clubs or organizations: Not on file     Relationship status: Not on file    Intimate partner violence:     Fear of current or ex partner: Not on file     Emotionally abused: Not on file     Physically abused: Not on file     Forced sexual activity: Not on file   Other Topics Concern    Not on file   Social History Narrative    Not on file     Family History:   No GI malignancies     ROS:  GENERAL: No fever or chills. NEURO: No headache or seizure. EYES: No diplopia or vision loss. CARDIOVASCULAR: No chest pain or palpitations. RESPIRATORY: No dyspnea or cough. GI: NO melena. NO hematochezia   : No dysuria or hematuria. GYN: No discharge or abnormal bleeding. MUSCULOSKELETAL: No new arthralgias or myalgias  DERM: No rash or jaundice. ENDOCRINE: No polyuria or polydipsia. PSYCH: No anxiety or depression. Physical Exam:  VITALS: /70   Pulse 82   Temp 97.3 °F (36.3 °C) (Oral)   Resp 20   Ht 5' 4\" (1.626 m)   Wt 262 lb 3.2 oz (118.9 kg)   SpO2 95%   BMI 45.01 kg/m²   The patient is a 62 y.o.  female with Body mass index is 45.01 kg/m². in no acute distress. HEAD: Normal cephalic/atraumatic. Extra-occular motions intact bilaterally. NECK: No lymphadenopathy or bruits. CHEST: Rises equally on inspiration. Clear to auscultation bilaterally. CARDIOVASCULAR: Regular rate and rhythm without murmurs, rubs or gallops. ABDOMEN: Soft and grossly distended with normal bowel sounds. No Hepatosplemomegaly. Tender:  diffusely  UPPER EXTREMITIES: no cyanosis, clubbing, or edema. DERM: no rash or jaundice. LOWER EXTREMITIES: no cyanosis, clubbing, or edema. NEURO: Alert and oriented times four. Patient moves all extremities and has gross sensation in all extremities.     Assessment:    Cirrhosis, acutely decompensated   Alcoholism, abstaining 10y   Ascites - new x5 months   Hypoalbuminemia   Pancytopenia    Leukopenia     Anemia, macro    thrombocytopenia   ^INR, mild   Ascites     Infection? - possibly   Tumor? - possibly   GI bleeding? - no signs of hemorrhage   Thrombosis?  - possibly   Alcoholic hepatitis? - no signs   Med Noncompliance? - no   Diet Noncompliance? - no   Sedatives? - no    [facility] - ARF vs CKD  Gallstones    Plan:    Complete hepatopathy labs    Check AFP  Check U/S with dopplers  U/A  Diagnostic paracentesis with albumin  Check anemia labs    Eduarda Rebolledo MD  11:14 AM 8/2/2019

## 2019-08-02 NOTE — PROGRESS NOTES
Transfer  [facility] [address] [facility]  Referring Physician Bina Carr  Accepting Physician Hanna Boswell  Patient Condition:60 yo presents to ER with abdominal distension. She has gained 30 pounds in 1 month. Is SOB and having difficulty eating. Hx of kidney problems, not specific. Needs a paracentesis they are unable to do it there. Labs BUN 24, Creat. 1.7, GFR 32, was diagnosed 1-2 months ago at [facility] with cirrhosis.  Albumin is 3 Hgb 10.9 INR 1.4 Blood glucose 320            Potential floor/room:  Vitals B/P 119/72   P 80  R 20      P02 98%             T              Oxygen needs  IV Drips  Fax Face Sheet  Accepted on behalf of Dr Hanna Boswell to 4R01 for Ascites

## 2019-08-02 NOTE — FLOWSHEET NOTE
08/02/19 1344   Encounter Summary   Services provided to: Patient   Referral/Consult From: Erick   Continue Visiting Yes  (8/2 AD info. )   Complexity of Encounter Moderate   Length of Encounter 15 minutes   Spiritual/Church   Type Spiritual support   Advance Directives (For Healthcare)   Healthcare Directive No, patient does not have an advance directive for healthcare treatment   Information on Healthcare Directives Requested No   Patient Requests Assistance Yes, referral made to    Advance Directives Documents given   Advance Directive Consult: Advance Directive materials were provided to patient and explained. Patient is not ready to complete at this time, gave information for Spiritual Care to be contacted if further assistance is needed.

## 2019-08-03 LAB
ALBUMIN SERPL-MCNC: 3 G/DL (ref 3.5–5.1)
ALP BLD-CCNC: 82 U/L (ref 38–126)
ALT SERPL-CCNC: 7 U/L (ref 11–66)
ANION GAP SERPL CALCULATED.3IONS-SCNC: 12 MEQ/L (ref 8–16)
AST SERPL-CCNC: 11 U/L (ref 5–40)
BASOPHILS # BLD: 0.5 %
BASOPHILS ABSOLUTE: 0 THOU/MM3 (ref 0–0.1)
BILIRUB SERPL-MCNC: 0.4 MG/DL (ref 0.3–1.2)
BILIRUBIN DIRECT: < 0.2 MG/DL (ref 0–0.3)
BUN BLDV-MCNC: 23 MG/DL (ref 7–22)
CALCIUM SERPL-MCNC: 8.2 MG/DL (ref 8.5–10.5)
CHLORIDE BLD-SCNC: 109 MEQ/L (ref 98–111)
CO2: 21 MEQ/L (ref 23–33)
CREAT SERPL-MCNC: 1.3 MG/DL (ref 0.4–1.2)
EOSINOPHIL # BLD: 0 %
EOSINOPHILS ABSOLUTE: 0 THOU/MM3 (ref 0–0.4)
ERYTHROCYTE [DISTWIDTH] IN BLOOD BY AUTOMATED COUNT: 14 % (ref 11.5–14.5)
ERYTHROCYTE [DISTWIDTH] IN BLOOD BY AUTOMATED COUNT: 52.9 FL (ref 35–45)
GFR SERPL CREATININE-BSD FRML MDRD: 42 ML/MIN/1.73M2
GLUCOSE BLD-MCNC: 132 MG/DL (ref 70–108)
GLUCOSE BLD-MCNC: 237 MG/DL (ref 70–108)
HCT VFR BLD CALC: 31.4 % (ref 37–47)
HEMOGLOBIN: 9.9 GM/DL (ref 12–16)
IMMATURE GRANS (ABS): 0.01 THOU/MM3 (ref 0–0.07)
IMMATURE GRANULOCYTES: 0.3 %
INR BLD: 1.22 (ref 0.85–1.13)
LYMPHOCYTES # BLD: 11.2 %
LYMPHOCYTES ABSOLUTE: 0.4 THOU/MM3 (ref 1–4.8)
MCH RBC QN AUTO: 32.5 PG (ref 26–33)
MCHC RBC AUTO-ENTMCNC: 31.5 GM/DL (ref 32.2–35.5)
MCV RBC AUTO: 103 FL (ref 81–99)
MONOCYTES # BLD: 10.1 %
MONOCYTES ABSOLUTE: 0.4 THOU/MM3 (ref 0.4–1.3)
NUCLEATED RED BLOOD CELLS: 0 /100 WBC
PLATELET # BLD: 75 THOU/MM3 (ref 130–400)
PMV BLD AUTO: 9.7 FL (ref 9.4–12.4)
POTASSIUM SERPL-SCNC: 4.2 MEQ/L (ref 3.5–5.2)
RBC # BLD: 3.05 MILL/MM3 (ref 4.2–5.4)
SEG NEUTROPHILS: 77.9 %
SEGMENTED NEUTROPHILS ABSOLUTE COUNT: 2.9 THOU/MM3 (ref 1.8–7.7)
SODIUM BLD-SCNC: 142 MEQ/L (ref 135–145)
T4 FREE: 0.37 NG/DL (ref 0.93–1.76)
TOTAL PROTEIN: 5.7 G/DL (ref 6.1–8)
TSH SERPL DL<=0.05 MIU/L-ACNC: 115 UIU/ML (ref 0.4–4.2)
WBC # BLD: 3.7 THOU/MM3 (ref 4.8–10.8)

## 2019-08-03 PROCEDURE — 80053 COMPREHEN METABOLIC PANEL: CPT

## 2019-08-03 PROCEDURE — 97165 OT EVAL LOW COMPLEX 30 MIN: CPT

## 2019-08-03 PROCEDURE — 97530 THERAPEUTIC ACTIVITIES: CPT

## 2019-08-03 PROCEDURE — 2500000003 HC RX 250 WO HCPCS: Performed by: PHYSICIAN ASSISTANT

## 2019-08-03 PROCEDURE — 2709999900 HC NON-CHARGEABLE SUPPLY

## 2019-08-03 PROCEDURE — 36415 COLL VENOUS BLD VENIPUNCTURE: CPT

## 2019-08-03 PROCEDURE — 1200000000 HC SEMI PRIVATE

## 2019-08-03 PROCEDURE — 6370000000 HC RX 637 (ALT 250 FOR IP): Performed by: INTERNAL MEDICINE

## 2019-08-03 PROCEDURE — 6370000000 HC RX 637 (ALT 250 FOR IP): Performed by: PHYSICIAN ASSISTANT

## 2019-08-03 PROCEDURE — 6360000002 HC RX W HCPCS: Performed by: PHYSICIAN ASSISTANT

## 2019-08-03 PROCEDURE — 99233 SBSQ HOSP IP/OBS HIGH 50: CPT | Performed by: INTERNAL MEDICINE

## 2019-08-03 PROCEDURE — 85610 PROTHROMBIN TIME: CPT

## 2019-08-03 PROCEDURE — 2580000003 HC RX 258: Performed by: PHYSICIAN ASSISTANT

## 2019-08-03 PROCEDURE — 84439 ASSAY OF FREE THYROXINE: CPT

## 2019-08-03 PROCEDURE — 85025 COMPLETE CBC W/AUTO DIFF WBC: CPT

## 2019-08-03 PROCEDURE — 84443 ASSAY THYROID STIM HORMONE: CPT

## 2019-08-03 PROCEDURE — 82248 BILIRUBIN DIRECT: CPT

## 2019-08-03 PROCEDURE — 82948 REAGENT STRIP/BLOOD GLUCOSE: CPT

## 2019-08-03 RX ADMIN — CEFTRIAXONE SODIUM 1 G: 1 INJECTION, POWDER, FOR SOLUTION INTRAMUSCULAR; INTRAVENOUS at 07:05

## 2019-08-03 RX ADMIN — LEVOTHYROXINE SODIUM 200 MCG: 100 TABLET ORAL at 05:34

## 2019-08-03 RX ADMIN — BACLOFEN 20 MG: 10 TABLET ORAL at 05:40

## 2019-08-03 RX ADMIN — GLIMEPIRIDE 2 MG: 2 TABLET ORAL at 08:12

## 2019-08-03 RX ADMIN — FOLIC ACID: 5 INJECTION, SOLUTION INTRAMUSCULAR; INTRAVENOUS; SUBCUTANEOUS at 08:54

## 2019-08-03 RX ADMIN — BACLOFEN 20 MG: 10 TABLET ORAL at 20:03

## 2019-08-03 RX ADMIN — SIMVASTATIN 40 MG: 40 TABLET, FILM COATED ORAL at 20:03

## 2019-08-03 RX ADMIN — SERTRALINE 50 MG: 50 TABLET, FILM COATED ORAL at 08:12

## 2019-08-03 RX ADMIN — INSULIN LISPRO 2 UNITS: 100 INJECTION, SOLUTION INTRAVENOUS; SUBCUTANEOUS at 17:22

## 2019-08-03 ASSESSMENT — PAIN DESCRIPTION - DESCRIPTORS: DESCRIPTORS: ACHING

## 2019-08-03 ASSESSMENT — PAIN DESCRIPTION - ORIENTATION: ORIENTATION: LEFT

## 2019-08-03 ASSESSMENT — PAIN DESCRIPTION - DIRECTION: RADIATING_TOWARDS: UPPER ABDOMEN

## 2019-08-03 ASSESSMENT — PAIN - FUNCTIONAL ASSESSMENT: PAIN_FUNCTIONAL_ASSESSMENT: PREVENTS OR INTERFERES SOME ACTIVE ACTIVITIES AND ADLS

## 2019-08-03 ASSESSMENT — PAIN SCALES - GENERAL
PAINLEVEL_OUTOF10: 0
PAINLEVEL_OUTOF10: 0
PAINLEVEL_OUTOF10: 3
PAINLEVEL_OUTOF10: 0

## 2019-08-03 ASSESSMENT — PAIN DESCRIPTION - PROGRESSION
CLINICAL_PROGRESSION: NOT CHANGED
CLINICAL_PROGRESSION: NOT CHANGED

## 2019-08-03 ASSESSMENT — PAIN DESCRIPTION - ONSET: ONSET: ON-GOING

## 2019-08-03 ASSESSMENT — PAIN DESCRIPTION - PAIN TYPE: TYPE: ACUTE PAIN

## 2019-08-03 ASSESSMENT — PAIN DESCRIPTION - LOCATION: LOCATION: ABDOMEN

## 2019-08-03 ASSESSMENT — PAIN DESCRIPTION - FREQUENCY: FREQUENCY: INTERMITTENT

## 2019-08-03 NOTE — PROGRESS NOTES
CASTS 2 NONE SEEN NONE SEEN /lpf    MISCELLANEOUS 2 NONE SEEN    Urine Culture, Reflexed    Collection Time: 08/02/19 10:22 PM   Result Value Ref Range    Organism enteric gram negative bacilli (A)     Urine Culture Reflex [place] count: >100,000 CFU/mL    CBC Auto Differential    Collection Time: 08/03/19  6:04 AM   Result Value Ref Range    WBC 3.7 (L) 4.8 - 10.8 thou/mm3    RBC 3.05 (L) 4.20 - 5.40 mill/mm3    Hemoglobin 9.9 (L) 12.0 - 16.0 gm/dl    Hematocrit 31.4 (L) 37.0 - 47.0 %    .0 (H) 81.0 - 99.0 fL    MCH 32.5 26.0 - 33.0 pg    MCHC 31.5 (L) 32.2 - 35.5 gm/dl    RDW-CV 14.0 11.5 - 14.5 %    RDW-SD 52.9 (H) 35.0 - 45.0 fL    Platelets 75 (L) 467 - 400 thou/mm3    MPV 9.7 9.4 - 12.4 fL    Seg Neutrophils 77.9 %    Lymphocytes 11.2 %    Monocytes 10.1 %    Eosinophils 0.0 %    Basophils 0.5 %    Immature Granulocytes 0.3 %    Segs Absolute 2.9 1.8 - 7.7 thou/mm3    Lymphocytes # 0.4 (L) 1.0 - 4.8 thou/mm3    Monocytes # 0.4 0.4 - 1.3 thou/mm3    Eosinophils # 0.0 0.0 - 0.4 thou/mm3    Basophils # 0.0 0.0 - 0.1 thou/mm3    Immature Grans (Abs) 0.01 0.00 - 0.07 thou/mm3    nRBC 0 /100 wbc   Basic Metabolic Panel    Collection Time: 08/03/19  6:04 AM   Result Value Ref Range    Sodium 142 135 - 145 meq/L    Potassium 4.2 3.5 - 5.2 meq/L    Chloride 109 98 - 111 meq/L    CO2 21 (L) 23 - 33 meq/L    Glucose 132 (H) 70 - 108 mg/dL    BUN 23 (H) 7 - 22 mg/dL    CREATININE 1.3 (H) 0.4 - 1.2 mg/dL    Calcium 8.2 (L) 8.5 - 10.5 mg/dL   Hepatic Function Panel    Collection Time: 08/03/19  6:04 AM   Result Value Ref Range    Alb 3.0 (L) 3.5 - 5.1 g/dL    Total Bilirubin 0.4 0.3 - 1.2 mg/dL    Bilirubin, Direct <0.2 0.0 - 0.3 mg/dL    Alkaline Phosphatase 82 38 - 126 U/L    AST 11 5 - 40 U/L    ALT 7 (L) 11 - 66 U/L    Total Protein 5.7 (L) 6.1 - 8.0 g/dL   Protime-INR    Collection Time: 08/03/19  6:04 AM   Result Value Ref Range    INR 1.22 (H) 0.85 - 1.13   Anion Gap    Collection Time: 08/03/19  6:04 AM Result Value Ref Range    Anion Gap 12.0 8.0 - 16.0 meq/L   Glomerular Filtration Rate, Estimated    Collection Time: 08/03/19  6:04 AM   Result Value Ref Range    Est, Glom Filt Rate 42 (A) ml/min/1.73m2   TSH with Reflex    Collection Time: 08/03/19  6:04 AM   Result Value Ref Range    .000 (H) 0.400 - 4.20 uIU/mL       Current Meds    cefTRIAXone (ROCEPHIN) IV  1 g Intravenous Q24H    baclofen  20 mg Oral Nightly    glimepiride  2 mg Oral BID WC    levothyroxine  200 mcg Oral QAM AC    sertraline  50 mg Oral Daily    simvastatin  40 mg Oral Nightly    sodium chloride flush  10 mL Intravenous 2 times per day    nicotine  1 patch Transdermal Daily    folic acid, thiamine, multi-vitamin with vitamin K infusion   Intravenous Daily      dextrose         Problem list:  Active Problems:    Acquired hypothyroidism    Alcoholic cirrhosis of liver with ascites (HCC)    COPD (chronic obstructive pulmonary disease) (HCC)    Stage 3 chronic kidney disease (HCC)    Type 2 diabetes mellitus with stage 3 chronic kidney disease, without long-term current use of insulin (HCC)    Ascites  Resolved Problems:    * No resolved hospital problems. *        ASSESSMENT AND PLAN:  Cirrhosis, acutely decompensated              Alcoholism, abstaining 10y              Ascites - new x5 months              Hypoalbuminemia              Pancytopenia                          Leukopenia                               Anemia, macro                          thrombocytopenia              ^INR, mild              Ascites     [facility] - ARF vs CKD  Gallstones  UTI  Ascites, removed 5L no SBP        - Complete hepatopathy labs, pending  - supportive care per primary team  - continue to monitor H/H, transfuse as needed for Hgb < 8  - continue ceftriaxone for UTI    Thank you for allowing us to participate in the care of this patient. Feel free to contact [facility] or myself with any questions or concerns.  GI will continue to

## 2019-08-03 NOTE — PLAN OF CARE
Problem: Pain:  Goal: Pain level will decrease  Description  Pain level will decrease  1/5/1646 6951 by Verna Zaman RN  Outcome: Ongoing   Patient denies any pain during this shift, states she feels much better from yesterday. Patient denies needing any additional medication at this time. Problem: Falls - Risk of:  Goal: Will remain free from falls  Description  Will remain free from falls  5/5/0136 4960 by Verna Zaman RN  Outcome: Ongoing   Pt remains free from falls during shift. Pt appropriately uses call light when needing to get out of bed, call light remains within reach, bed in low position, and 2/4 side rails are up. Pt reeducated to continue to use call light, BA remains on. Problem: GI  Goal: No bowel complications  9/0/5767 2031 by Verna Zaman RN  Outcome: Ongoing  Pt has had one small bowel movement during shift. Problem: Nutrition  Goal: Optimal nutrition therapy  7/6/9210 8319 by Verna Zaman RN  Outcome: Ongoing   Pt able to tolerate breakfast tray and remains free of nausea. Problem: Skin Integrity/Risk  Goal: No skin breakdown during hospitalization  4/2/7763 6616 by Verna Zaman RN  Outcome: Ongoing   Pt able to move independently in bed. No skin breakdown noted.

## 2019-08-03 NOTE — PLAN OF CARE
Problem: Pain:  Goal: Pain level will decrease  Description  Pain level will decrease  8/2/2019 2258 by Chin Day RN  Outcome: Ongoing  Note:   Patient denies pain. Pain goal 0/10. White board updated. Pain goal 0/10     Problem: Pain:  Goal: Control of acute pain  Description  Control of acute pain  8/2/2019 2258 by Chin Day RN  Outcome: Ongoing  Note:   Patient denies pain. Pain goal 0/10. White board updated. Pain goal 0/10     Problem: Pain:  Goal: Control of chronic pain  Description  Control of chronic pain  8/2/2019 2258 by Chin Day RN  Outcome: Ongoing  Note:   Patient denies pain. Pain goal 0/10. White board updated. Pain goal 0/10     Problem: Falls - Risk of:  Goal: Will remain free from falls  Description  Will remain free from falls  8/2/2019 2258 by Chin Day RN  Outcome: Ongoing  Note:   Bed in lowest position, padded side rails up 2/4, call light within reach, bed alarm on. Non-skid socks on. Patient up with 1 assist to commode and returns to bed. Problem: Falls - Risk of:  Goal: Absence of physical injury  Description  Absence of physical injury  8/2/2019 2258 by Chin Day RN  Outcome: Ongoing  Note:   Bed in lowest position, padded side rails up 2/4, call light within reach, bed alarm on. Non-skid socks on. Patient up with 1 assist to commode and returns to bed. Problem: GI  Goal: No bowel complications  0/3/8549 8983 by Chin Day RN  Outcome: Ongoing  Note:   Bowel sounds active. Abdomen soft and non-tender. Passing gas. Problem: GI  Goal: Bowel movement at least every other day  8/2/2019 2258 by Chin Day RN  Outcome: Ongoing  Note:   Bowel sounds active. Abdomen soft and non-tender. Passing gas. Problem: Nutrition  Goal: Optimal nutrition therapy  8/2/2019 2258 by Chin Day RN  Outcome: Ongoing  Note:   Patient on general low sodium diet. Denies nausea.  Patient given evening meal.     Problem: Nutrition  Goal: Understanding of nutritional guidelines  8/2/2019 2258 by Candance Heal, RN  Outcome: Ongoing  Note:   Patient on general low sodium diet. Denies nausea. Patient given evening meal.     Problem: Skin Integrity/Risk  Goal: No skin breakdown during hospitalization  8/2/2019 2258 by Candance Heal, RN  Outcome: Ongoing  Note:   Puncture site to right upper quadrant. Bed bug bites scattered. Blanchable redness to buttocks. Patient able to turn and reposition in bed as needed. Legs elevated off bed      Problem: Skin Integrity/Risk  Goal: Wound healing  8/2/2019 2258 by Candance Heal, RN  Outcome: Ongoing  Note:   Puncture site to right upper quadrant. Bed bug bites scattered. Blanchable redness to buttocks. Patient able to turn and reposition in bed as needed. Legs elevated off bed      Problem: Discharge Planning:  Goal: Participates in care planning  Description  Participates in care planning  8/2/2019 2258 by Candance Heal, RN  Outcome: Ongoing  Note:   Discharge planning to home with family.  on care team      Problem: Discharge Planning:  Goal: Discharged to appropriate level of care  Description  Discharged to appropriate level of care  8/2/2019 2258 by Candance Heal, RN  Outcome: Ongoing  Note:   Discharge planning to home with family.  on care team    Care plan reviewed with patient. Patient verbalize understanding of the plan of care and contribute to goal setting.

## 2019-08-04 LAB
ALPHA-1 ANTITRYPSIN: 144 MG/DL (ref 90–200)
ANA SCREEN: NORMAL
ANION GAP SERPL CALCULATED.3IONS-SCNC: 8 MEQ/L (ref 8–16)
BASOPHILS # BLD: 0.6 %
BASOPHILS ABSOLUTE: 0 THOU/MM3 (ref 0–0.1)
BUN BLDV-MCNC: 22 MG/DL (ref 7–22)
CALCIUM SERPL-MCNC: 8.3 MG/DL (ref 8.5–10.5)
CERULOPLASMIN: 22 MG/DL (ref 17–54)
CHLORIDE BLD-SCNC: 111 MEQ/L (ref 98–111)
CO2: 22 MEQ/L (ref 23–33)
CREAT SERPL-MCNC: 1.4 MG/DL (ref 0.4–1.2)
EOSINOPHIL # BLD: 0 %
EOSINOPHILS ABSOLUTE: 0 THOU/MM3 (ref 0–0.4)
ERYTHROCYTE [DISTWIDTH] IN BLOOD BY AUTOMATED COUNT: 14 % (ref 11.5–14.5)
ERYTHROCYTE [DISTWIDTH] IN BLOOD BY AUTOMATED COUNT: 53.1 FL (ref 35–45)
F-ACTIN AB IGG: 18 UNITS (ref 0–19)
GFR SERPL CREATININE-BSD FRML MDRD: 38 ML/MIN/1.73M2
GLUCOSE BLD-MCNC: 151 MG/DL (ref 70–108)
GLUCOSE BLD-MCNC: 206 MG/DL (ref 70–108)
GLUCOSE BLD-MCNC: 227 MG/DL (ref 70–108)
GLUCOSE BLD-MCNC: 241 MG/DL (ref 70–108)
HCT VFR BLD CALC: 31.9 % (ref 37–47)
HEMOGLOBIN: 10.1 GM/DL (ref 12–16)
IMMATURE GRANS (ABS): 0.01 THOU/MM3 (ref 0–0.07)
IMMATURE GRANULOCYTES: 0.3 %
LYMPHOCYTES # BLD: 10.8 %
LYMPHOCYTES ABSOLUTE: 0.4 THOU/MM3 (ref 1–4.8)
MAGNESIUM: 2.1 MG/DL (ref 1.6–2.4)
MCH RBC QN AUTO: 32.7 PG (ref 26–33)
MCHC RBC AUTO-ENTMCNC: 31.7 GM/DL (ref 32.2–35.5)
MCV RBC AUTO: 103.2 FL (ref 81–99)
MITOCHONDRIAL ANTIBODY: 5.5 UNITS (ref 0–20)
MONOCYTES # BLD: 9.9 %
MONOCYTES ABSOLUTE: 0.4 THOU/MM3 (ref 0.4–1.3)
NEUTROPHIL CYTOPLASMIC AB IGG: NORMAL
NUCLEATED RED BLOOD CELLS: 0 /100 WBC
ORGANISM: ABNORMAL
PLATELET # BLD: 73 THOU/MM3 (ref 130–400)
PMV BLD AUTO: 9.4 FL (ref 9.4–12.4)
POTASSIUM SERPL-SCNC: 4.6 MEQ/L (ref 3.5–5.2)
RBC # BLD: 3.09 MILL/MM3 (ref 4.2–5.4)
SEG NEUTROPHILS: 78.4 %
SEGMENTED NEUTROPHILS ABSOLUTE COUNT: 2.8 THOU/MM3 (ref 1.8–7.7)
SODIUM BLD-SCNC: 141 MEQ/L (ref 135–145)
URINE CULTURE REFLEX: ABNORMAL
WBC # BLD: 3.6 THOU/MM3 (ref 4.8–10.8)

## 2019-08-04 PROCEDURE — 99233 SBSQ HOSP IP/OBS HIGH 50: CPT | Performed by: INTERNAL MEDICINE

## 2019-08-04 PROCEDURE — 80048 BASIC METABOLIC PNL TOTAL CA: CPT

## 2019-08-04 PROCEDURE — 2580000003 HC RX 258: Performed by: PHYSICIAN ASSISTANT

## 2019-08-04 PROCEDURE — 6370000000 HC RX 637 (ALT 250 FOR IP): Performed by: PHYSICIAN ASSISTANT

## 2019-08-04 PROCEDURE — 1200000000 HC SEMI PRIVATE

## 2019-08-04 PROCEDURE — 6360000002 HC RX W HCPCS: Performed by: PHYSICIAN ASSISTANT

## 2019-08-04 PROCEDURE — 2709999900 HC NON-CHARGEABLE SUPPLY

## 2019-08-04 PROCEDURE — 83735 ASSAY OF MAGNESIUM: CPT

## 2019-08-04 PROCEDURE — 85025 COMPLETE CBC W/AUTO DIFF WBC: CPT

## 2019-08-04 PROCEDURE — 6370000000 HC RX 637 (ALT 250 FOR IP): Performed by: INTERNAL MEDICINE

## 2019-08-04 PROCEDURE — 2500000003 HC RX 250 WO HCPCS: Performed by: PHYSICIAN ASSISTANT

## 2019-08-04 PROCEDURE — 36415 COLL VENOUS BLD VENIPUNCTURE: CPT

## 2019-08-04 PROCEDURE — 82948 REAGENT STRIP/BLOOD GLUCOSE: CPT

## 2019-08-04 RX ADMIN — BACLOFEN 20 MG: 10 TABLET ORAL at 19:35

## 2019-08-04 RX ADMIN — CEFTRIAXONE SODIUM 1 G: 1 INJECTION, POWDER, FOR SOLUTION INTRAMUSCULAR; INTRAVENOUS at 06:05

## 2019-08-04 RX ADMIN — INSULIN LISPRO 1 UNITS: 100 INJECTION, SOLUTION INTRAVENOUS; SUBCUTANEOUS at 21:04

## 2019-08-04 RX ADMIN — LEVOTHYROXINE SODIUM 200 MCG: 100 TABLET ORAL at 05:52

## 2019-08-04 RX ADMIN — FOLIC ACID: 5 INJECTION, SOLUTION INTRAMUSCULAR; INTRAVENOUS; SUBCUTANEOUS at 11:33

## 2019-08-04 RX ADMIN — INSULIN LISPRO 2 UNITS: 100 INJECTION, SOLUTION INTRAVENOUS; SUBCUTANEOUS at 17:24

## 2019-08-04 RX ADMIN — SERTRALINE 50 MG: 50 TABLET, FILM COATED ORAL at 09:30

## 2019-08-04 RX ADMIN — INSULIN LISPRO 2 UNITS: 100 INJECTION, SOLUTION INTRAVENOUS; SUBCUTANEOUS at 12:39

## 2019-08-04 RX ADMIN — SIMVASTATIN 40 MG: 40 TABLET, FILM COATED ORAL at 19:35

## 2019-08-04 ASSESSMENT — PAIN DESCRIPTION - PROGRESSION
CLINICAL_PROGRESSION: NOT CHANGED

## 2019-08-04 ASSESSMENT — PAIN SCALES - GENERAL
PAINLEVEL_OUTOF10: 0
PAINLEVEL_OUTOF10: 0

## 2019-08-05 VITALS
WEIGHT: 262.2 LBS | SYSTOLIC BLOOD PRESSURE: 143 MMHG | HEART RATE: 72 BPM | RESPIRATION RATE: 18 BRPM | TEMPERATURE: 97.5 F | OXYGEN SATURATION: 97 % | HEIGHT: 64 IN | DIASTOLIC BLOOD PRESSURE: 69 MMHG | BODY MASS INDEX: 44.76 KG/M2

## 2019-08-05 PROBLEM — E66.01 MORBID OBESITY DUE TO EXCESS CALORIES (HCC): Status: ACTIVE | Noted: 2019-08-05

## 2019-08-05 LAB
ALBUMIN SERPL-MCNC: 2.9 G/DL (ref 3.5–5.1)
ALP BLD-CCNC: 95 U/L (ref 38–126)
ALT SERPL-CCNC: 9 U/L (ref 11–66)
ANION GAP SERPL CALCULATED.3IONS-SCNC: 9 MEQ/L (ref 8–16)
AST SERPL-CCNC: 14 U/L (ref 5–40)
BILIRUB SERPL-MCNC: 0.3 MG/DL (ref 0.3–1.2)
BUN BLDV-MCNC: 20 MG/DL (ref 7–22)
CALCIUM SERPL-MCNC: 8.4 MG/DL (ref 8.5–10.5)
CHLORIDE BLD-SCNC: 110 MEQ/L (ref 98–111)
CO2: 20 MEQ/L (ref 23–33)
CREAT SERPL-MCNC: 1.3 MG/DL (ref 0.4–1.2)
ERYTHROCYTE [DISTWIDTH] IN BLOOD BY AUTOMATED COUNT: 14.1 % (ref 11.5–14.5)
ERYTHROCYTE [DISTWIDTH] IN BLOOD BY AUTOMATED COUNT: 53.3 FL (ref 35–45)
GFR SERPL CREATININE-BSD FRML MDRD: 42 ML/MIN/1.73M2
GLUCOSE BLD-MCNC: 222 MG/DL (ref 70–108)
GLUCOSE BLD-MCNC: 228 MG/DL (ref 70–108)
GLUCOSE BLD-MCNC: 262 MG/DL (ref 70–108)
GLUCOSE BLD-MCNC: 278 MG/DL (ref 70–108)
HCT VFR BLD CALC: 32.3 % (ref 37–47)
HEMOGLOBIN: 10 GM/DL (ref 12–16)
MCH RBC QN AUTO: 32.3 PG (ref 26–33)
MCHC RBC AUTO-ENTMCNC: 31 GM/DL (ref 32.2–35.5)
MCV RBC AUTO: 104.2 FL (ref 81–99)
PLATELET # BLD: 80 THOU/MM3 (ref 130–400)
PMV BLD AUTO: 10 FL (ref 9.4–12.4)
POTASSIUM SERPL-SCNC: 4.3 MEQ/L (ref 3.5–5.2)
RBC # BLD: 3.1 MILL/MM3 (ref 4.2–5.4)
SODIUM BLD-SCNC: 139 MEQ/L (ref 135–145)
TOTAL PROTEIN: 5.9 G/DL (ref 6.1–8)
WBC # BLD: 3.8 THOU/MM3 (ref 4.8–10.8)

## 2019-08-05 PROCEDURE — 6370000000 HC RX 637 (ALT 250 FOR IP): Performed by: PHYSICIAN ASSISTANT

## 2019-08-05 PROCEDURE — 2709999900 HC NON-CHARGEABLE SUPPLY

## 2019-08-05 PROCEDURE — 80053 COMPREHEN METABOLIC PANEL: CPT

## 2019-08-05 PROCEDURE — 82948 REAGENT STRIP/BLOOD GLUCOSE: CPT

## 2019-08-05 PROCEDURE — 85027 COMPLETE CBC AUTOMATED: CPT

## 2019-08-05 PROCEDURE — 2580000003 HC RX 258: Performed by: PHYSICIAN ASSISTANT

## 2019-08-05 PROCEDURE — 97110 THERAPEUTIC EXERCISES: CPT

## 2019-08-05 PROCEDURE — 6370000000 HC RX 637 (ALT 250 FOR IP): Performed by: INTERNAL MEDICINE

## 2019-08-05 PROCEDURE — 99239 HOSP IP/OBS DSCHRG MGMT >30: CPT | Performed by: INTERNAL MEDICINE

## 2019-08-05 PROCEDURE — 36415 COLL VENOUS BLD VENIPUNCTURE: CPT

## 2019-08-05 PROCEDURE — 97530 THERAPEUTIC ACTIVITIES: CPT

## 2019-08-05 PROCEDURE — 6360000002 HC RX W HCPCS: Performed by: PHYSICIAN ASSISTANT

## 2019-08-05 PROCEDURE — 97163 PT EVAL HIGH COMPLEX 45 MIN: CPT

## 2019-08-05 RX ORDER — NICOTINE 21 MG/24HR
1 PATCH, TRANSDERMAL 24 HOURS TRANSDERMAL DAILY
Qty: 30 PATCH | Refills: 3 | Status: ON HOLD | COMMUNITY
Start: 2019-08-06 | End: 2019-09-17

## 2019-08-05 RX ORDER — FUROSEMIDE 40 MG/1
40 TABLET ORAL DAILY
Qty: 30 TABLET | Refills: 5 | Status: ON HOLD | OUTPATIENT
Start: 2019-08-05 | End: 2019-09-20 | Stop reason: SDUPTHER

## 2019-08-05 RX ORDER — SPIRONOLACTONE 50 MG/1
50 TABLET, FILM COATED ORAL DAILY
Qty: 30 TABLET | Refills: 5 | Status: ON HOLD | OUTPATIENT
Start: 2019-08-05 | End: 2019-09-17

## 2019-08-05 RX ADMIN — INSULIN LISPRO 3 UNITS: 100 INJECTION, SOLUTION INTRAVENOUS; SUBCUTANEOUS at 11:38

## 2019-08-05 RX ADMIN — CEFTRIAXONE SODIUM 1 G: 1 INJECTION, POWDER, FOR SOLUTION INTRAMUSCULAR; INTRAVENOUS at 05:47

## 2019-08-05 RX ADMIN — INSULIN LISPRO 2 UNITS: 100 INJECTION, SOLUTION INTRAVENOUS; SUBCUTANEOUS at 10:02

## 2019-08-05 RX ADMIN — FENTANYL CITRATE 50 MCG: 50 INJECTION, SOLUTION INTRAMUSCULAR; INTRAVENOUS at 05:58

## 2019-08-05 RX ADMIN — INSULIN LISPRO 3 UNITS: 100 INJECTION, SOLUTION INTRAVENOUS; SUBCUTANEOUS at 17:44

## 2019-08-05 RX ADMIN — LEVOTHYROXINE SODIUM 200 MCG: 100 TABLET ORAL at 05:47

## 2019-08-05 RX ADMIN — SERTRALINE 50 MG: 50 TABLET, FILM COATED ORAL at 08:55

## 2019-08-05 ASSESSMENT — PAIN DESCRIPTION - PROGRESSION: CLINICAL_PROGRESSION: RESOLVED

## 2019-08-05 ASSESSMENT — PAIN SCALES - GENERAL
PAINLEVEL_OUTOF10: 0
PAINLEVEL_OUTOF10: 5

## 2019-08-05 NOTE — PROGRESS NOTES
97%.  Functional mobility completed to increase overall activity tolerance needed for ADL tasks. ASSESSMENT:  Activity Tolerance:  Patient tolerance of  treatment: fair. Discharge Recommendations: Home with assist PRN, Home with Home health OT  Equipment Recommendations: Equipment Needed: No  Plan: Times per week: 3-5X  Current Treatment Recommendations: Strengthening, Balance Training, Functional Mobility Training, Endurance Training, Safety Education & Training, Patient/Caregiver Education & Training, Self-Care / ADL    Patient Education  Patient Education: Home Safety and Importance of Increasing Activity    Goals  Short term goals  Time Frame for Short term goals: by discharge  Short term goal 1: PT will complete functional mobility within environment with S and RW with no vc for safety to improve indep with simple IADLs athome  Short term goal 2: Pt will complete LB ADls with S and LHAE prn with no vc for safety   Short term goal 3: Pt will complete functional transfers with S including to/from toilet  Short term goal 4: Pt will  complete BADL routine Mod I with no vc for EC strategies prn to increase ease with sponge bathing at home  Short term goal 5: Pt will tolerate standing ADL with S for > 3 minutes with 2 hand release and no LOB for ease with sink side grooming  Long term goals  Time Frame for Long term goals : No LTG established d/t short ELOS    Following session, patient left in safe position with all fall risk precautions in place.

## 2019-08-05 NOTE — PLAN OF CARE
Problem: Pain:  Goal: Pain level will decrease  Description  Pain level will decrease  9/1/8043 2666 by Olga Mendosa RN  Outcome: Ongoing   Patient denies pain during this shift, pt is aware she has pain medication ordered if needed. Problem: Falls - Risk of:  Goal: Will remain free from falls  Description  Will remain free from falls  5/2/6842 9941 by Olga Mendosa RN  Outcome: Ongoing  Patient remains free from falls during shift, call light remains within reach. Pt reeducated not to get out of bed on her own and she voices understanding. Pt up in chair at this time, chair alarm activated. Problem: Nutrition  Goal: Optimal nutrition therapy  6/4/2087 2305 by Olga Mendosa RN  Outcome: Ongoing  Patient remains on a general low sodium diet and tolerating well. Denies any feelings of nausea or vomiting. Problem: Discharge Planning:  Goal: Participates in care planning  Description  Participates in care planning  1/2/2469 2135 by Olga Mendosa RN  Outcome: Ongoing   Discharge date has not yet been discussed yet with patient, but plan will be for her to return home with her daughter.

## 2019-08-05 NOTE — PLAN OF CARE
allowed to remain at the highest level of independent function in anticipation of future discharge. The patient's ADL's were performed by the patient as much as possible and the patient is an active participant in the plan of care. Care plan reviewed with patient. Patient verbalized understanding of the plan of care and contributes to goal setting.   Electronically signed by Darlene Clifford RN on 8/5/2019 at 1:28 AM

## 2019-08-05 NOTE — DISCHARGE SUMMARY
primary care for significant weight gain. She was instructed on a low salt diet. Due to the distance from [facility], she wished to discuss GI follow up with her primary care. She will need an EGD to screen for varices. .     Thyroid studies were done, and a TSH was >100; she admitted to not taking her thyroid med. She was advised not to miss doses. Exam:     Vitals:  Vitals:    08/04/19 1931 08/05/19 0015 08/05/19 0800 08/05/19 1528   BP: 122/64 (!) 143/67 (!) 146/73 (!) 143/69   Pulse: 71 66 81 72   Resp: 16 16 16 18   Temp: 98.1 °F (36.7 °C) 97.6 °F (36.4 °C) 97.5 °F (36.4 °C) 97.5 °F (36.4 °C)   TempSrc: Oral Oral Oral Oral   SpO2: 98% 97% 98% 97%   Weight:       Height:         Weight: Weight: 262 lb 3.2 oz (118.9 kg)     24 hour intake/output:    Intake/Output Summary (Last 24 hours) at 8/5/2019 1810  Last data filed at 8/5/2019 1624  Gross per 24 hour   Intake 1204 ml   Output --   Net 1204 ml         General appearance:  No apparent distress, appears stated age and cooperative. Chronically ill appearing    HEENT:  Normal cephalic, atraumatic without obvious deformity. Pupils equal, round, and reactive to light. Extra ocular muscles intact. Conjunctivae/corneas clear. Neck: Supple, with full range of motion. No jugular venous distention. Trachea midline. Respiratory:  Normal respiratory effort. Clear to auscultation, bilaterally without Rales/Wheezes/Rhonchi. Cardiovascular:  Regular rate and rhythm with normal S1/S2 without murmurs, rubs or gallops. Abdomen:  Protuberant, tight. Musculoskeletal:  No clubbing, cyanosis or edema bilaterally. Full range of motion without deformity. Skin: Skin color, texture, turgor normal.  No rashes or lesions. Neurologic:  Neurovascularly intact without any focal sensory/motor deficits.  Cranial nerves: II-XII intact, grossly non-focal.  Psychiatric:  Alert and oriented, thought content appropriate, normal insight  Capillary Refill: Brisk,< 3 seconds

## 2019-08-07 LAB
ANAEROBIC CULTURE: NORMAL
BODY FLUID CULTURE, STERILE: NORMAL
GRAM STAIN RESULT: NORMAL

## 2019-08-21 ENCOUNTER — HOSPITAL ENCOUNTER (INPATIENT)
Age: 59
LOS: 8 days | Discharge: HOME HEALTH | DRG: 280 | End: 2019-08-29
Attending: INTERNAL MEDICINE | Admitting: INTERNAL MEDICINE
Payer: MEDICAID

## 2019-08-21 DIAGNOSIS — E03.9 HYPOTHYROIDISM, UNSPECIFIED TYPE: Primary | ICD-10-CM

## 2019-08-21 PROBLEM — R18.8 ASCITES: Status: ACTIVE | Noted: 2019-08-21

## 2019-08-21 PROCEDURE — 99223 1ST HOSP IP/OBS HIGH 75: CPT | Performed by: PHYSICIAN ASSISTANT

## 2019-08-21 PROCEDURE — 83690 ASSAY OF LIPASE: CPT

## 2019-08-21 PROCEDURE — 36415 COLL VENOUS BLD VENIPUNCTURE: CPT

## 2019-08-21 PROCEDURE — 2709999900 HC NON-CHARGEABLE SUPPLY

## 2019-08-21 PROCEDURE — 85027 COMPLETE CBC AUTOMATED: CPT

## 2019-08-21 PROCEDURE — 80048 BASIC METABOLIC PNL TOTAL CA: CPT

## 2019-08-21 PROCEDURE — 80076 HEPATIC FUNCTION PANEL: CPT

## 2019-08-21 PROCEDURE — 1200000000 HC SEMI PRIVATE

## 2019-08-21 PROCEDURE — 83036 HEMOGLOBIN GLYCOSYLATED A1C: CPT

## 2019-08-21 RX ORDER — SODIUM CHLORIDE 0.9 % (FLUSH) 0.9 %
10 SYRINGE (ML) INJECTION PRN
Status: DISCONTINUED | OUTPATIENT
Start: 2019-08-21 | End: 2019-08-29 | Stop reason: HOSPADM

## 2019-08-21 RX ORDER — SODIUM CHLORIDE 0.9 % (FLUSH) 0.9 %
10 SYRINGE (ML) INJECTION EVERY 12 HOURS SCHEDULED
Status: DISCONTINUED | OUTPATIENT
Start: 2019-08-21 | End: 2019-08-29 | Stop reason: HOSPADM

## 2019-08-21 RX ORDER — ONDANSETRON 2 MG/ML
4 INJECTION INTRAMUSCULAR; INTRAVENOUS EVERY 6 HOURS PRN
Status: DISCONTINUED | OUTPATIENT
Start: 2019-08-21 | End: 2019-08-29 | Stop reason: HOSPADM

## 2019-08-21 RX ORDER — POTASSIUM CHLORIDE 7.45 MG/ML
10 INJECTION INTRAVENOUS PRN
Status: DISCONTINUED | OUTPATIENT
Start: 2019-08-21 | End: 2019-08-29 | Stop reason: HOSPADM

## 2019-08-21 RX ORDER — SODIUM CHLORIDE 9 MG/ML
INJECTION, SOLUTION INTRAVENOUS CONTINUOUS
Status: DISCONTINUED | OUTPATIENT
Start: 2019-08-21 | End: 2019-08-22

## 2019-08-21 ASSESSMENT — PAIN DESCRIPTION - ORIENTATION: ORIENTATION: LEFT;ANTERIOR;MID;UPPER

## 2019-08-21 ASSESSMENT — PAIN DESCRIPTION - LOCATION: LOCATION: ABDOMEN

## 2019-08-21 ASSESSMENT — PAIN - FUNCTIONAL ASSESSMENT: PAIN_FUNCTIONAL_ASSESSMENT: PREVENTS OR INTERFERES SOME ACTIVE ACTIVITIES AND ADLS

## 2019-08-21 ASSESSMENT — PAIN DESCRIPTION - FREQUENCY: FREQUENCY: INTERMITTENT

## 2019-08-21 ASSESSMENT — PAIN DESCRIPTION - PAIN TYPE: TYPE: ACUTE PAIN

## 2019-08-21 ASSESSMENT — PAIN DESCRIPTION - ONSET: ONSET: PROGRESSIVE

## 2019-08-21 ASSESSMENT — PAIN DESCRIPTION - DESCRIPTORS: DESCRIPTORS: SHARP

## 2019-08-21 ASSESSMENT — PAIN DESCRIPTION - PROGRESSION: CLINICAL_PROGRESSION: GRADUALLY IMPROVING

## 2019-08-21 ASSESSMENT — PAIN SCALES - GENERAL: PAINLEVEL_OUTOF10: 9

## 2019-08-22 ENCOUNTER — APPOINTMENT (OUTPATIENT)
Dept: INTERVENTIONAL RADIOLOGY/VASCULAR | Age: 59
DRG: 280 | End: 2019-08-22
Attending: INTERNAL MEDICINE
Payer: MEDICAID

## 2019-08-22 ENCOUNTER — APPOINTMENT (OUTPATIENT)
Dept: ULTRASOUND IMAGING | Age: 59
DRG: 280 | End: 2019-08-22
Attending: INTERNAL MEDICINE
Payer: MEDICAID

## 2019-08-22 PROBLEM — R10.12 LUQ ABDOMINAL PAIN: Status: ACTIVE | Noted: 2019-08-22

## 2019-08-22 PROBLEM — I10 HTN (HYPERTENSION): Status: ACTIVE | Noted: 2019-08-22

## 2019-08-22 LAB
ALBUMIN SERPL-MCNC: 3.2 G/DL (ref 3.5–5.1)
ALP BLD-CCNC: 107 U/L (ref 38–126)
ALT SERPL-CCNC: 12 U/L (ref 11–66)
ANION GAP SERPL CALCULATED.3IONS-SCNC: 12 MEQ/L (ref 8–16)
ANION GAP SERPL CALCULATED.3IONS-SCNC: 12 MEQ/L (ref 8–16)
AST SERPL-CCNC: 19 U/L (ref 5–40)
AVERAGE GLUCOSE: 216 MG/DL (ref 70–126)
BILIRUB SERPL-MCNC: 1 MG/DL (ref 0.3–1.2)
BILIRUBIN DIRECT: < 0.2 MG/DL (ref 0–0.3)
BODY FLUID RBC: < 2000 /CUMM
BUN BLDV-MCNC: 18 MG/DL (ref 7–22)
BUN BLDV-MCNC: 18 MG/DL (ref 7–22)
CALCIUM SERPL-MCNC: 8.4 MG/DL (ref 8.5–10.5)
CALCIUM SERPL-MCNC: 8.5 MG/DL (ref 8.5–10.5)
CHARACTER, BODY FLUID: CLEAR
CHLORIDE BLD-SCNC: 106 MEQ/L (ref 98–111)
CHLORIDE BLD-SCNC: 109 MEQ/L (ref 98–111)
CO2: 19 MEQ/L (ref 23–33)
CO2: 21 MEQ/L (ref 23–33)
COLOR: YELLOW
CREAT SERPL-MCNC: 1.6 MG/DL (ref 0.4–1.2)
CREAT SERPL-MCNC: 1.6 MG/DL (ref 0.4–1.2)
ERYTHROCYTE [DISTWIDTH] IN BLOOD BY AUTOMATED COUNT: 14.1 % (ref 11.5–14.5)
ERYTHROCYTE [DISTWIDTH] IN BLOOD BY AUTOMATED COUNT: 14.2 % (ref 11.5–14.5)
ERYTHROCYTE [DISTWIDTH] IN BLOOD BY AUTOMATED COUNT: 53.4 FL (ref 35–45)
ERYTHROCYTE [DISTWIDTH] IN BLOOD BY AUTOMATED COUNT: 54.1 FL (ref 35–45)
GFR SERPL CREATININE-BSD FRML MDRD: 33 ML/MIN/1.73M2
GFR SERPL CREATININE-BSD FRML MDRD: 33 ML/MIN/1.73M2
GLUCOSE BLD-MCNC: 173 MG/DL (ref 70–108)
GLUCOSE BLD-MCNC: 184 MG/DL (ref 70–108)
GLUCOSE BLD-MCNC: 186 MG/DL (ref 70–108)
GLUCOSE BLD-MCNC: 190 MG/DL (ref 70–108)
GLUCOSE BLD-MCNC: 251 MG/DL (ref 70–108)
HBA1C MFR BLD: 9.2 % (ref 4.4–6.4)
HCT VFR BLD CALC: 34.2 % (ref 37–47)
HCT VFR BLD CALC: 34.5 % (ref 37–47)
HEMOGLOBIN: 10.7 GM/DL (ref 12–16)
HEMOGLOBIN: 10.8 GM/DL (ref 12–16)
LIPASE: 29.3 U/L (ref 5.6–51.3)
MCH RBC QN AUTO: 32.1 PG (ref 26–33)
MCH RBC QN AUTO: 32.3 PG (ref 26–33)
MCHC RBC AUTO-ENTMCNC: 31.3 GM/DL (ref 32.2–35.5)
MCHC RBC AUTO-ENTMCNC: 31.3 GM/DL (ref 32.2–35.5)
MCV RBC AUTO: 102.7 FL (ref 81–99)
MCV RBC AUTO: 103.3 FL (ref 81–99)
MONONUCLEAR CELLS BODY FLUID: 86.9 %
PATHOLOGIST REVIEW: NORMAL
PLATELET # BLD: 101 THOU/MM3 (ref 130–400)
PLATELET # BLD: 104 THOU/MM3 (ref 130–400)
PMV BLD AUTO: 9.6 FL (ref 9.4–12.4)
PMV BLD AUTO: 9.7 FL (ref 9.4–12.4)
POLYMORPHONUCLEAR CELLS BODY FLUID: 13.1 %
POTASSIUM REFLEX MAGNESIUM: 4.1 MEQ/L (ref 3.5–5.2)
POTASSIUM SERPL-SCNC: 4.1 MEQ/L (ref 3.5–5.2)
RBC # BLD: 3.31 MILL/MM3 (ref 4.2–5.4)
RBC # BLD: 3.36 MILL/MM3 (ref 4.2–5.4)
SODIUM BLD-SCNC: 139 MEQ/L (ref 135–145)
SODIUM BLD-SCNC: 140 MEQ/L (ref 135–145)
SPECIMEN: NORMAL
TOTAL NUCLEATED CELLS BODY FLUID: 118 /CUMM (ref 0–500)
TOTAL PROTEIN: 6.5 G/DL (ref 6.1–8)
TOTAL VOLUME RECEIVED BODY FLUID: 80 ML
WBC # BLD: 4 THOU/MM3 (ref 4.8–10.8)
WBC # BLD: 4.1 THOU/MM3 (ref 4.8–10.8)

## 2019-08-22 PROCEDURE — 2580000003 HC RX 258: Performed by: PHYSICIAN ASSISTANT

## 2019-08-22 PROCEDURE — 87075 CULTR BACTERIA EXCEPT BLOOD: CPT

## 2019-08-22 PROCEDURE — 87070 CULTURE OTHR SPECIMN AEROBIC: CPT

## 2019-08-22 PROCEDURE — 87205 SMEAR GRAM STAIN: CPT

## 2019-08-22 PROCEDURE — 85027 COMPLETE CBC AUTOMATED: CPT

## 2019-08-22 PROCEDURE — 82948 REAGENT STRIP/BLOOD GLUCOSE: CPT

## 2019-08-22 PROCEDURE — 2709999900 HC NON-CHARGEABLE SUPPLY

## 2019-08-22 PROCEDURE — 89050 BODY FLUID CELL COUNT: CPT

## 2019-08-22 PROCEDURE — 93970 EXTREMITY STUDY: CPT

## 2019-08-22 PROCEDURE — 99233 SBSQ HOSP IP/OBS HIGH 50: CPT | Performed by: INTERNAL MEDICINE

## 2019-08-22 PROCEDURE — 80048 BASIC METABOLIC PNL TOTAL CA: CPT

## 2019-08-22 PROCEDURE — 0W9G3ZZ DRAINAGE OF PERITONEAL CAVITY, PERCUTANEOUS APPROACH: ICD-10-PCS | Performed by: RADIOLOGY

## 2019-08-22 PROCEDURE — 49083 ABD PARACENTESIS W/IMAGING: CPT

## 2019-08-22 PROCEDURE — 36415 COLL VENOUS BLD VENIPUNCTURE: CPT

## 2019-08-22 PROCEDURE — 6370000000 HC RX 637 (ALT 250 FOR IP): Performed by: PHYSICIAN ASSISTANT

## 2019-08-22 PROCEDURE — 1200000000 HC SEMI PRIVATE

## 2019-08-22 RX ORDER — NICOTINE 21 MG/24HR
1 PATCH, TRANSDERMAL 24 HOURS TRANSDERMAL DAILY
Status: DISCONTINUED | OUTPATIENT
Start: 2019-08-22 | End: 2019-08-29 | Stop reason: HOSPADM

## 2019-08-22 RX ORDER — NICOTINE POLACRILEX 4 MG
15 LOZENGE BUCCAL PRN
Status: DISCONTINUED | OUTPATIENT
Start: 2019-08-22 | End: 2019-08-29 | Stop reason: HOSPADM

## 2019-08-22 RX ORDER — INSULIN GLARGINE 100 [IU]/ML
0.25 INJECTION, SOLUTION SUBCUTANEOUS NIGHTLY
Status: DISCONTINUED | OUTPATIENT
Start: 2019-08-22 | End: 2019-08-22

## 2019-08-22 RX ORDER — SIMVASTATIN 40 MG
40 TABLET ORAL NIGHTLY
Status: DISCONTINUED | OUTPATIENT
Start: 2019-08-22 | End: 2019-08-29 | Stop reason: HOSPADM

## 2019-08-22 RX ORDER — INSULIN GLARGINE 100 [IU]/ML
5 INJECTION, SOLUTION SUBCUTANEOUS
Status: DISCONTINUED | OUTPATIENT
Start: 2019-08-23 | End: 2019-08-29 | Stop reason: HOSPADM

## 2019-08-22 RX ORDER — DEXTROSE MONOHYDRATE 25 G/50ML
12.5 INJECTION, SOLUTION INTRAVENOUS PRN
Status: DISCONTINUED | OUTPATIENT
Start: 2019-08-22 | End: 2019-08-29 | Stop reason: HOSPADM

## 2019-08-22 RX ORDER — DEXTROSE MONOHYDRATE 50 MG/ML
100 INJECTION, SOLUTION INTRAVENOUS PRN
Status: DISCONTINUED | OUTPATIENT
Start: 2019-08-22 | End: 2019-08-29 | Stop reason: HOSPADM

## 2019-08-22 RX ADMIN — SIMVASTATIN 40 MG: 40 TABLET, FILM COATED ORAL at 21:00

## 2019-08-22 RX ADMIN — SODIUM CHLORIDE, PRESERVATIVE FREE 10 ML: 5 INJECTION INTRAVENOUS at 21:38

## 2019-08-22 RX ADMIN — INSULIN LISPRO 2 UNITS: 100 INJECTION, SOLUTION INTRAVENOUS; SUBCUTANEOUS at 21:38

## 2019-08-22 RX ADMIN — INSULIN LISPRO 1 UNITS: 100 INJECTION, SOLUTION INTRAVENOUS; SUBCUTANEOUS at 18:08

## 2019-08-22 RX ADMIN — SODIUM CHLORIDE: 9 INJECTION, SOLUTION INTRAVENOUS at 03:10

## 2019-08-22 RX ADMIN — INSULIN LISPRO 1 UNITS: 100 INJECTION, SOLUTION INTRAVENOUS; SUBCUTANEOUS at 09:41

## 2019-08-22 RX ADMIN — SODIUM CHLORIDE, PRESERVATIVE FREE 10 ML: 5 INJECTION INTRAVENOUS at 03:12

## 2019-08-22 RX ADMIN — SERTRALINE 50 MG: 50 TABLET, FILM COATED ORAL at 09:40

## 2019-08-22 ASSESSMENT — PAIN SCALES - GENERAL
PAINLEVEL_OUTOF10: 6
PAINLEVEL_OUTOF10: 7

## 2019-08-22 ASSESSMENT — PAIN DESCRIPTION - PROGRESSION: CLINICAL_PROGRESSION: GRADUALLY WORSENING

## 2019-08-22 ASSESSMENT — PAIN DESCRIPTION - DESCRIPTORS: DESCRIPTORS: SHARP

## 2019-08-22 ASSESSMENT — PAIN - FUNCTIONAL ASSESSMENT: PAIN_FUNCTIONAL_ASSESSMENT: PREVENTS OR INTERFERES SOME ACTIVE ACTIVITIES AND ADLS

## 2019-08-22 ASSESSMENT — PAIN DESCRIPTION - FREQUENCY: FREQUENCY: INTERMITTENT

## 2019-08-22 ASSESSMENT — PAIN DESCRIPTION - ONSET: ONSET: PROGRESSIVE

## 2019-08-22 ASSESSMENT — PAIN DESCRIPTION - PAIN TYPE: TYPE: ACUTE PAIN

## 2019-08-22 ASSESSMENT — PAIN DESCRIPTION - ORIENTATION: ORIENTATION: LEFT;MID;UPPER

## 2019-08-22 ASSESSMENT — PAIN DESCRIPTION - LOCATION: LOCATION: ABDOMEN

## 2019-08-22 NOTE — CONSULTS
Ascites  2. Cirrhosis of the liver  3. LUQ abdominal pain  4. Melena  5. RAMBO  6. Slight anemia    PLAN:    1. US guided paracentesis with labs  2. Diuretics on hold due to RAMBO  3. EGD tomorrow 08/23/19 for melena, abd pain, cirrhosis check for varices  4. NPO at midnight  5. Will follow       Case reviewed and impression/plan reviewed in collaboration with Dr. Gisel Jean  Electronically signed by JW Duggan CNP on 8/22/2019 at 9:18 AM    [facility]  Thank you for the consultation.

## 2019-08-22 NOTE — PLAN OF CARE
Problem: Nutrition  Goal: Optimal nutrition therapy  Outcome: Ongoing   Nutrition Problem: Inadequate oral intake  Intervention: Food and/or Nutrient Delivery: Continue current diet, Start ONS  Nutritional Goals: Pt will consume 75% or more of meals during LOS.

## 2019-08-22 NOTE — PLAN OF CARE
Problem: DISCHARGE BARRIERS  Goal: Patient's continuum of care needs are met  Outcome: Ongoing  Note:   Discharge home agreeable to home health. See  notes 8/22/19.

## 2019-08-23 LAB
ALBUMIN SERPL-MCNC: 3 G/DL (ref 3.5–5.1)
ALP BLD-CCNC: 110 U/L (ref 38–126)
ALT SERPL-CCNC: 13 U/L (ref 11–66)
AMMONIA: 52 UMOL/L (ref 11–60)
ANION GAP SERPL CALCULATED.3IONS-SCNC: 10 MEQ/L (ref 8–16)
AST SERPL-CCNC: 17 U/L (ref 5–40)
BACTERIA: ABNORMAL /HPF
BILIRUB SERPL-MCNC: 0.5 MG/DL (ref 0.3–1.2)
BILIRUBIN URINE: ABNORMAL
BLOOD, URINE: ABNORMAL
BUN BLDV-MCNC: 19 MG/DL (ref 7–22)
CALCIUM SERPL-MCNC: 8.6 MG/DL (ref 8.5–10.5)
CASTS 2: ABNORMAL /LPF
CASTS UA: ABNORMAL /LPF
CHARACTER, URINE: ABNORMAL
CHLORIDE BLD-SCNC: 108 MEQ/L (ref 98–111)
CO2: 23 MEQ/L (ref 23–33)
COLOR: ABNORMAL
CREAT SERPL-MCNC: 1.7 MG/DL (ref 0.4–1.2)
CRYSTALS, UA: ABNORMAL
EPITHELIAL CELLS, UA: ABNORMAL /HPF
ERYTHROCYTE [DISTWIDTH] IN BLOOD BY AUTOMATED COUNT: 14 % (ref 11.5–14.5)
ERYTHROCYTE [DISTWIDTH] IN BLOOD BY AUTOMATED COUNT: 52.3 FL (ref 35–45)
GFR SERPL CREATININE-BSD FRML MDRD: 31 ML/MIN/1.73M2
GLUCOSE BLD-MCNC: 134 MG/DL (ref 70–108)
GLUCOSE BLD-MCNC: 158 MG/DL (ref 70–108)
GLUCOSE BLD-MCNC: 162 MG/DL (ref 70–108)
GLUCOSE BLD-MCNC: 232 MG/DL (ref 70–108)
GLUCOSE BLD-MCNC: 240 MG/DL (ref 70–108)
GLUCOSE URINE: NEGATIVE MG/DL
HCT VFR BLD CALC: 34.7 % (ref 37–47)
HEMOGLOBIN: 10.8 GM/DL (ref 12–16)
ICTOTEST: NEGATIVE
KETONES, URINE: NEGATIVE
LEUKOCYTE ESTERASE, URINE: ABNORMAL
MCH RBC QN AUTO: 31.7 PG (ref 26–33)
MCHC RBC AUTO-ENTMCNC: 31.1 GM/DL (ref 32.2–35.5)
MCV RBC AUTO: 101.8 FL (ref 81–99)
MISCELLANEOUS 2: ABNORMAL
NITRITE, URINE: NEGATIVE
PH UA: 6 (ref 5–9)
PLATELET # BLD: 104 THOU/MM3 (ref 130–400)
PMV BLD AUTO: 9.7 FL (ref 9.4–12.4)
POTASSIUM SERPL-SCNC: 4.2 MEQ/L (ref 3.5–5.2)
PROTEIN UA: 30
RBC # BLD: 3.41 MILL/MM3 (ref 4.2–5.4)
RBC URINE: ABNORMAL /HPF
RENAL EPITHELIAL, UA: ABNORMAL
SODIUM BLD-SCNC: 141 MEQ/L (ref 135–145)
SPECIFIC GRAVITY, URINE: 1.02 (ref 1–1.03)
T4 FREE: 0.47 NG/DL (ref 0.93–1.76)
TOTAL PROTEIN: 6.2 G/DL (ref 6.1–8)
TSH SERPL DL<=0.05 MIU/L-ACNC: 148.6 UIU/ML (ref 0.4–4.2)
UROBILINOGEN, URINE: 0.2 EU/DL (ref 0–1)
WBC # BLD: 4.5 THOU/MM3 (ref 4.8–10.8)
WBC UA: ABNORMAL /HPF
YEAST: ABNORMAL

## 2019-08-23 PROCEDURE — 2709999900 HC NON-CHARGEABLE SUPPLY

## 2019-08-23 PROCEDURE — 1200000000 HC SEMI PRIVATE

## 2019-08-23 PROCEDURE — 6370000000 HC RX 637 (ALT 250 FOR IP): Performed by: INTERNAL MEDICINE

## 2019-08-23 PROCEDURE — 87086 URINE CULTURE/COLONY COUNT: CPT

## 2019-08-23 PROCEDURE — 2709999900 HC NON-CHARGEABLE SUPPLY: Performed by: INTERNAL MEDICINE

## 2019-08-23 PROCEDURE — 51798 US URINE CAPACITY MEASURE: CPT

## 2019-08-23 PROCEDURE — 3609012400 HC EGD TRANSORAL BIOPSY SINGLE/MULTIPLE: Performed by: INTERNAL MEDICINE

## 2019-08-23 PROCEDURE — 82140 ASSAY OF AMMONIA: CPT

## 2019-08-23 PROCEDURE — 85027 COMPLETE CBC AUTOMATED: CPT

## 2019-08-23 PROCEDURE — 81001 URINALYSIS AUTO W/SCOPE: CPT

## 2019-08-23 PROCEDURE — 6360000002 HC RX W HCPCS: Performed by: INTERNAL MEDICINE

## 2019-08-23 PROCEDURE — 36415 COLL VENOUS BLD VENIPUNCTURE: CPT

## 2019-08-23 PROCEDURE — 51701 INSERT BLADDER CATHETER: CPT

## 2019-08-23 PROCEDURE — 82948 REAGENT STRIP/BLOOD GLUCOSE: CPT

## 2019-08-23 PROCEDURE — 2580000003 HC RX 258: Performed by: INTERNAL MEDICINE

## 2019-08-23 PROCEDURE — 99152 MOD SED SAME PHYS/QHP 5/>YRS: CPT | Performed by: INTERNAL MEDICINE

## 2019-08-23 PROCEDURE — 84439 ASSAY OF FREE THYROXINE: CPT

## 2019-08-23 PROCEDURE — 0DJ08ZZ INSPECTION OF UPPER INTESTINAL TRACT, VIA NATURAL OR ARTIFICIAL OPENING ENDOSCOPIC: ICD-10-PCS | Performed by: INTERNAL MEDICINE

## 2019-08-23 PROCEDURE — 80053 COMPREHEN METABOLIC PANEL: CPT

## 2019-08-23 PROCEDURE — 84443 ASSAY THYROID STIM HORMONE: CPT

## 2019-08-23 PROCEDURE — 99233 SBSQ HOSP IP/OBS HIGH 50: CPT | Performed by: INTERNAL MEDICINE

## 2019-08-23 PROCEDURE — 2580000003 HC RX 258: Performed by: PHYSICIAN ASSISTANT

## 2019-08-23 PROCEDURE — 2500000003 HC RX 250 WO HCPCS: Performed by: INTERNAL MEDICINE

## 2019-08-23 PROCEDURE — 97535 SELF CARE MNGMENT TRAINING: CPT

## 2019-08-23 PROCEDURE — 6370000000 HC RX 637 (ALT 250 FOR IP): Performed by: PHYSICIAN ASSISTANT

## 2019-08-23 PROCEDURE — 97166 OT EVAL MOD COMPLEX 45 MIN: CPT

## 2019-08-23 RX ORDER — FENTANYL CITRATE 50 UG/ML
INJECTION, SOLUTION INTRAMUSCULAR; INTRAVENOUS PRN
Status: DISCONTINUED | OUTPATIENT
Start: 2019-08-23 | End: 2019-08-23 | Stop reason: HOSPADM

## 2019-08-23 RX ORDER — LEVOTHYROXINE SODIUM ANHYDROUS 100 UG/5ML
300 INJECTION, POWDER, LYOPHILIZED, FOR SOLUTION INTRAVENOUS DAILY
Status: DISCONTINUED | OUTPATIENT
Start: 2019-08-23 | End: 2019-08-24

## 2019-08-23 RX ORDER — SPIRONOLACTONE 25 MG/1
50 TABLET ORAL DAILY
Status: DISCONTINUED | OUTPATIENT
Start: 2019-08-23 | End: 2019-08-29 | Stop reason: HOSPADM

## 2019-08-23 RX ORDER — BUMETANIDE 0.25 MG/ML
1 INJECTION, SOLUTION INTRAMUSCULAR; INTRAVENOUS ONCE
Status: COMPLETED | OUTPATIENT
Start: 2019-08-23 | End: 2019-08-23

## 2019-08-23 RX ORDER — SUCRALFATE 1 G/1
1 TABLET ORAL
Status: DISCONTINUED | OUTPATIENT
Start: 2019-08-23 | End: 2019-08-29 | Stop reason: HOSPADM

## 2019-08-23 RX ORDER — MIDAZOLAM HYDROCHLORIDE 1 MG/ML
INJECTION INTRAMUSCULAR; INTRAVENOUS PRN
Status: DISCONTINUED | OUTPATIENT
Start: 2019-08-23 | End: 2019-08-23 | Stop reason: HOSPADM

## 2019-08-23 RX ADMIN — SERTRALINE 50 MG: 50 TABLET, FILM COATED ORAL at 08:48

## 2019-08-23 RX ADMIN — SIMVASTATIN 40 MG: 40 TABLET, FILM COATED ORAL at 20:05

## 2019-08-23 RX ADMIN — CEFTRIAXONE SODIUM 1 G: 1 INJECTION, POWDER, FOR SOLUTION INTRAMUSCULAR; INTRAVENOUS at 20:05

## 2019-08-23 RX ADMIN — SUCRALFATE 1 G: 1 TABLET ORAL at 16:27

## 2019-08-23 RX ADMIN — SPIRONOLACTONE 50 MG: 25 TABLET ORAL at 18:20

## 2019-08-23 RX ADMIN — INSULIN LISPRO 2 UNITS: 100 INJECTION, SOLUTION INTRAVENOUS; SUBCUTANEOUS at 17:44

## 2019-08-23 RX ADMIN — INSULIN LISPRO 1 UNITS: 100 INJECTION, SOLUTION INTRAVENOUS; SUBCUTANEOUS at 20:06

## 2019-08-23 RX ADMIN — BUMETANIDE 1 MG: 0.25 INJECTION INTRAMUSCULAR; INTRAVENOUS at 17:44

## 2019-08-23 RX ADMIN — INSULIN GLARGINE 5 UNITS: 100 INJECTION, SOLUTION SUBCUTANEOUS at 08:49

## 2019-08-23 RX ADMIN — SODIUM CHLORIDE, PRESERVATIVE FREE 10 ML: 5 INJECTION INTRAVENOUS at 09:00

## 2019-08-23 RX ADMIN — LEVOTHYROXINE SODIUM 175 MCG: 150 TABLET ORAL at 06:29

## 2019-08-23 RX ADMIN — SUCRALFATE 1 G: 1 TABLET ORAL at 20:05

## 2019-08-23 RX ADMIN — SODIUM CHLORIDE, PRESERVATIVE FREE 10 ML: 5 INJECTION INTRAVENOUS at 20:21

## 2019-08-23 ASSESSMENT — PAIN SCALES - GENERAL
PAINLEVEL_OUTOF10: 4
PAINLEVEL_OUTOF10: 0

## 2019-08-23 NOTE — H&P
[address] [address] Endoscopy    Pre-Endoscopy H&PE      Patient: Linda Curtis    : 1960    Acct#: [de-identified]  Primary Care Physician: Tamiko Foote PA-C   Date of evaluation: 2019    Planned Procedure:    [x]EGD    []Enteroscopy    []PEG    []PEG change    []PEG removal  [x]Variceal banding    []Biopsy   []Dilation      [x]Control of bleeding  []Destruction of lesion by [facility]    []Stent Placement  []Foreign Body Removal    []Snare Polypectomy  []Other:       []Colonoscopy  []Flex Sigmoid []EUS, rectal      []Biopsy   []Dilation      []Control of bleeding  []Destruction of lesion by [facility]    []Stent Placement  []Foreign Body Removal    []Snare Polypectomy  []Other:      Planned Sedation  [x]Conscious Sedation []MAC/Propofol []Anesthesia    []None    Airway:  Adequate for planned sedation    Indication:   Nausea, melena, anemia, eval for esophageal variecs    History:  62 y.o. female with alcoholic cirrhosis admitted 19-19 for ascites due to decompensated cirrhosis from UTI. She was readmitted 19 with nausea, ascites, melena. Paracentesis w/o SBP. Physical Exam:  VITALS: BP (!) 105/59   Pulse 79   Temp 98.1 °F (36.7 °C) (Oral)   Resp 18   Ht 5' 4\" (1.626 m)   Wt 268 lb 9.6 oz (121.8 kg)   SpO2 96%   BMI 46.11 kg/m²   The patient is a 62 y.o.  female in no acute distress. HEAD: Normal cephalic/atraumatic. Extra-occular motions intact bilaterally. NECK: No lymphadenopathy or bruits. CHEST: Rises equally on inspiration. Clear to auscultation bilaterally. CARDIOVASCULAR: Regular rate and rhythm without murmurs, rubs or gallops. ABDOMEN: Soft, nontender, and GROSSLY distended with normal bowel sounds. No Hepatosplemomegaly. UPPER EXTREMITIES: no cyanosis, clubbing, or edema. DERM: no rash or jaundice. LOWER EXTREMITIES: no cyanosis, clubbing, or edema. NEURO: Alert and oriented times four.  Patient moves all extremities and has gross sensation in all

## 2019-08-23 NOTE — H&P
[facility], H&P, and face sheet faxed to [address] [place] [address].

## 2019-08-23 NOTE — PROGRESS NOTES
Hospitalist Progress Note    Patient:  Dionne Weiner      Unit/Bed:5K-18/018-A    YOB: 1960    MRN: [id number]       Acct: [de-identified]     PCP: Trenton Reynoso PA-C    Date of Admission: 8/21/2019      Assessment/Plan:  Active Hospital Problems    Diagnosis Date Noted    HTN (hypertension) [I10] 08/22/2019    LUQ abdominal pain [R10.12] 08/22/2019    Ascites [R18.8] 41/31/1170    Alcoholic cirrhosis of liver with ascites ([facility] [address] [K70.31] 05/16/2019    Type 2 diabetes mellitus with stage 3 chronic kidney disease, without long-term current use of insulin ([facility] [address] [E11.22, N18.3] 05/16/2019    Hypothyroid [E03.9] 05/16/2019       Decompensated Alcoholic Cirrhosis with ascites - due to non-compliance. Last paracentesis was 8/2/19 during hospitalization but has not been taking diuretics (nausea). - monitor liver labs  - therapeutic/diagnostic para 8/22 with 5L removed   - albumin per GI   - Cell count not supportive of SBP   - follow gram stain/culture  - likely will need a repeat para in near future, Will start diuretics and discuss with GI about additional para. - monitor creatinine  - low Na diet and fluid restrict. RAMBO vs CKD 3 - near baseline, will continue to monitor. Likely due to abdominal distension/renal congestion. Avoid nephrotoxins. Give albumin post para to prevent HRS. Restart diuretics as frank. Stop IVF. Macrocytic Anemia - vitamin B12 and folate wnl 8/2/19. Likely related to liver disease and etoh history. At baseline. Assymetric LE edema - will check LE doppler, No DVT    Pancytopenia - seems slightly improved from recent hospital stay. A result of HSM and cirrhosis. Will monitor. Depression - continue sertraline    DM2 - hold glimeperide, continue on SSI with accuchecks for now. Lantus 10 u qhs. Severe Hypothyroidism - with notoriously poor control/overt hypothyroidism due to noncompliance. , Free T4 0.4. Will consult endo.     Remote EtOH abuse quit \"21 years ago\"    Poor Medical Compliance - sw to be involved. Attempt to get GI physician closer to home. Expected discharge date:  TBD    Disposition: pendking         [] Home       [] TCU       [] Rehab       [] Psych       [] SNF       [] [place]       [] Other-    --------------------------------------------    Chief Complaint: Abdominal pain    Hospital Course: See above. Subjective (past 24 hours):  Feels a bit better today due to LV para. Poor urine output today. TSH severely elevated. Patient sleepy all day. No NVF. No abdominal pain. Medications:  Reviewed    Infusion Medications    dextrose       Scheduled Medications    sucralfate  1 g Oral 4x Daily AC & HS    bumetanide  1 mg Intravenous Once    spironolactone  50 mg Oral Daily    levothyroxine  175 mcg Oral Daily    nicotine  1 patch Transdermal Daily    sertraline  50 mg Oral Daily    simvastatin  40 mg Oral Nightly    insulin lispro  0-6 Units Subcutaneous TID WC    insulin lispro  0-3 Units Subcutaneous Nightly    insulin glargine  5 Units Subcutaneous QAM AC    sodium chloride flush  10 mL Intravenous 2 times per day    enoxaparin  40 mg Subcutaneous Daily     PRN Meds: glucose, dextrose, glucagon (rDNA), dextrose, sodium chloride flush, ondansetron, magnesium hydroxide, magnesium sulfate, potassium chloride      Intake/Output Summary (Last 24 hours) at 8/23/2019 1619  Last data filed at 8/23/2019 1601  Gross per 24 hour   Intake 250 ml   Output 250 ml   Net 0 ml     Weight change:       Exam:  /65   Pulse 91   Temp 97.4 °F (36.3 °C) (Oral)   Resp 16   Ht 5' 4\" (1.626 m)   Wt 268 lb 9.6 oz (121.8 kg)   SpO2 95%   BMI 46.11 kg/m²     General appearance: Mild apparent distress, obese, appears older than stated age  Eyes:  Pupils equal, round, and reactive to light. Conjunctivae/corneas clear. HENT: Head normal in appearance.  External nares normal.  Oral mucosa moist without

## 2019-08-23 NOTE — CONSULTS
Daily Randy Apgar, PA-C   50 mg at 19 0848    simvastatin (ZOCOR) tablet 40 mg  40 mg Oral Nightly Randy Apgar, PA-C   40 mg at 19 2100    glucose (GLUTOSE) 40 % oral gel 15 g  15 g Oral PRN Chelsie Bee PA-C        dextrose 50 % IV solution  12.5 g Intravenous PRN Chelsie Bee PA-C        glucagon (rDNA) injection 1 mg  1 mg Intramuscular PRN Chelsie Bee PA-C        dextrose 5 % solution  100 mL/hr Intravenous PRN Chelsie Bee PA-C        insulin lispro (HUMALOG) injection vial 0-6 Units  0-6 Units Subcutaneous TID WC Chelsie Bee PA-C   1 Units at 19 1808    insulin lispro (HUMALOG) injection vial 0-3 Units  0-3 Units Subcutaneous Nightly Randy Apgar, PA-C   2 Units at 19 2138    insulin glargine (LANTUS) injection vial 5 Units  5 Units Subcutaneous QA OPHELIA Starr DO   5 Units at 19 0849    sodium chloride flush 0.9 % injection 10 mL  10 mL Intravenous 2 times per day Randy Apgar, PA-C   10 mL at 19 0900    sodium chloride flush 0.9 % injection 10 mL  10 mL Intravenous PRN Chelsie Bee PA-C        ondansetron (ZOFRAN) injection 4 mg  4 mg Intravenous Q6H PRN Chelsie Bee PA-C        magnesium hydroxide (MILK OF MAGNESIA) 400 MG/5ML suspension 30 mL  30 mL Oral Daily PRN Chelsie Bee PA-C        magnesium sulfate 1 g in dextrose 5 % 100 mL IVPB  1 g Intravenous PRN Chelsie Bee PA-C        potassium chloride 10 mEq/100 mL IVPB (Peripheral Line)  10 mEq Intravenous PRN Chelsie Bee PA-C        enoxaparin (LOVENOX) injection 40 mg  40 mg Subcutaneous Daily Chelsie Bee PA-C             Objective:        Vitals:    19 1330   BP: 104/65   Pulse: 91   Resp: 16   Temp: 97.4 °F (36.3 °C)   SpO2: 95%     Body mass index is 46.11 kg/m². General:  alert, appears stated age and cooperative,    Oropharynx: lips, mucosa, and tongue normal; teeth and gums normal    Eyes:  conjunctivae/corneas clear. PERRL, EOM's intact.

## 2019-08-23 NOTE — OP NOTE
[address] [address] Endoscopy    Patient: Johnny Lloyd  : 1960  Acct#: [de-identified]  Date of evaluation: 2019  Primary Care Physician: Sharon Andrew PA-C     Procedure:    [x]EGD    []Enteroscopy    []Biopsy   []Dilation      []PEG    []PEG change    []PEG removal  []Variceal banding    []Control of bleeding  []Destruction of lesion by [facility]    []Stent Placement  []Foreign Body Removal    []Snare Polypectomy  []Other:     []Aborted:        []Colonoscopy  []Flex Sigmoid []EUS, rectal     []Biopsy   []Snare Polypectomy    []Control of bleeding  []Destruction of lesion by [facility]    []Stent Placement  []Foreign Body Removal    []Dilation    []Other:    []Aborted:   []Tattoo    Indication:   Nausea, melena, anemia, eval for esophageal variecs    History:  62 y.o. female with alcoholic cirrhosis admitted 19-19 for ascites due to decompensated cirrhosis from UTI. She was readmitted 19 with nausea, ascites, melena. Paracentesis w/o SBP. Physical Exam:  VITALS: BP (!) 105/59   Pulse 79   Temp 98.1 °F (36.7 °C) (Oral)   Resp 18   Ht 5' 4\" (1.626 m)   Wt 268 lb 9.6 oz (121.8 kg)   SpO2 96%   BMI 46.11 kg/m²   The patient is a 62 y.o.  female in no acute distress. HEAD: Normal cephalic/atraumatic. Extra-occular motions intact bilaterally. NECK: No lymphadenopathy or bruits. CHEST: Rises equally on inspiration. Clear to auscultation bilaterally. CARDIOVASCULAR: Regular rate and rhythm without murmurs, rubs or gallops. ABDOMEN: Soft, nontender, and GROSSLY distended with normal bowel sounds. No Hepatosplemomegaly. UPPER EXTREMITIES: no cyanosis, clubbing, or edema. DERM: no rash or jaundice. LOWER EXTREMITIES: no cyanosis, clubbing, or edema. NEURO: Alert and oriented times four. Patient moves all extremities and has gross sensation in all extremities.     ASA: ASA 3 - Patient with moderate systemic disease with functional limitations    MEDICATION:    Cetacaine spray:  Yes                               Fentanyl 100 mg IV                              Versed 4 mg IV     MAC/Propofol/Anesthesia:  No      Sedation start time:  1210  Sedation end time:  1226     Photo:  Yes  Biopsy:  No      Description of Procedure: The risks and benefits of the procedure were described to the patient or their representative if unable to give consent including but not limited to bleeding, infection, poking a hole someplace requiring surgery to fix it, having a reaction to medication, and death. The patient or their representative if unable to give consent understood these risks and provided informed consent. Airway was assessed and is adequate for the planned sedation. Anesthesia: Moderate Sedation  Estimated Blood Loss: less than 50   Complications: None  Specimens: Was Not Obtained     Conscious sedation was administered consisting of Versed and Fentanyl in small incremental doses. The patient was continuously monitored to ensure adequate sedation and patient safety. The patient was placed in the left lateral decubitus position. A forward-viewing Olympus endoscope was lubricated and inserted through the mouth into the oropharynx. Under direct visualization, the upper esophagus was intubated. The scope was advanced to the esophagus and stomach to second portion of duodenum. Scope was slowly withdrawn with good views of mucosal surfaces. The scope was retroflexed in the fundus. Findings and maneuvers are listed in impression below. The patient tolerated the procedure well. The scope was removed. There were no immediate complications. IMPRESSION:  1. Esophagus - medium-sized esophageal varices w/o Garett signs  2. Stomach - severe portal gastropathy with hematin  3. Duodenum - normal     RECOMMENDATIONS:    1.   Carafate 1000 mg po Concetta Feldman MD  12:28 PM 8/23/2019

## 2019-08-23 NOTE — PROCEDURES
Last void was at 6 am per [facility] when the mederos catheter was discontinued. Bladder scan was completed. 18 ml noted. Results given to [facility]. Informed nurse that patients with ascites do not given an accurate read. Further evaluation needed.

## 2019-08-23 NOTE — PROGRESS NOTES
current medical information, gathering information related to past medical, social and functional history, completion of standardized testing, formal and informal observation of tasks, assessment of data and development of plan of care and goals. Treatment time included skilled education and facilitation of tasks to increase safety and independence with ADL's for improved functional independence and quality of life. Discharge Recommendations:  Home with assist PRN    Patient Education:  OT Education: OT Role, Plan of Care    Equipment Recommendations:  Equipment Needed: No  Other: possibly a RW    Plan:  Times per week: 3-5x    Goals:  Patient goals : go home   Short term goals  Time Frame for Short term goals: 2 weeks   Short term goal 1: Pt to complete sponge bathing tasks with min A   Short term goal 2: Pt to demo dynamic standing > 2 min with SBA and no no UE support to complete ADL tasks   Short term goal 3: Pt to navigate throughout room using RW with CGA to obtain needed ADL items  Long term goals  Time Frame for Long term goals : not est d/t ELOS    Following session, patient left in safe position with all fall risk precautions in place.

## 2019-08-23 NOTE — PLAN OF CARE
Problem: Pain:  Goal: Pain level will decrease  Description  Pain level will decrease  8/23/2019 1058 by Emma Toney RN  Outcome: Ongoing  Note:   Denies pain. Problem: Falls - Risk of:  Goal: Will remain free from falls  Description  Will remain free from falls  8/23/2019 1058 by Emma Toney RN  Outcome: Ongoing  Note:   No fall this shift, in bed with call light in reach, door to room open, and bed alarm on. Out of bed with standby assist to bedside commode. Has not gotten out of bed yet this shift. Problem: Respiratory  Goal: O2 Sat > 90%  8/23/2019 1058 by Emma Toney RN  Outcome: Ongoing  Note:   Remains on room air. Denies SOB at rest. Pulse ox remains above 90%     Problem: GI  Goal: No bowel complications  0/13/1979 1058 by Emma Toney RN  Outcome: Ongoing  Note:   Incontinent at times of stool. Going for EGD today. Problem: Discharge Planning:  Goal: Participates in care planning  Description  Participates in care planning  8/23/2019 1058 by Emma Toney RN  Outcome: Ongoing  Note:   Home with Daughter. Problem: Nutrition  Goal: Optimal nutrition therapy  8/23/2019 1058 by Emma Toney RN  Outcome: Ongoing  Note:   NPO for EGD     Problem: Serum Glucose Level - Abnormal:  Goal: Ability to maintain appropriate glucose levels has stabilized  Description  Ability to maintain appropriate glucose levels has stabilized  Outcome: Ongoing  Note:   Monitoring blood sugars AC/HS    Care plan reviewed with patient. Patient verbalizes understanding of the plan of care and contributes to goal setting.

## 2019-08-24 ENCOUNTER — APPOINTMENT (OUTPATIENT)
Dept: ULTRASOUND IMAGING | Age: 59
DRG: 280 | End: 2019-08-24
Attending: INTERNAL MEDICINE
Payer: MEDICAID

## 2019-08-24 LAB
ALBUMIN SERPL-MCNC: 3 G/DL (ref 3.5–5.1)
ALP BLD-CCNC: 112 U/L (ref 38–126)
ALT SERPL-CCNC: 13 U/L (ref 11–66)
ANION GAP SERPL CALCULATED.3IONS-SCNC: 10 MEQ/L (ref 8–16)
AST SERPL-CCNC: 20 U/L (ref 5–40)
BACTERIA: ABNORMAL /HPF
BASOPHILS # BLD: 0.3 %
BASOPHILS ABSOLUTE: 0 THOU/MM3 (ref 0–0.1)
BILIRUB SERPL-MCNC: 0.3 MG/DL (ref 0.3–1.2)
BILIRUBIN DIRECT: < 0.2 MG/DL (ref 0–0.3)
BILIRUBIN URINE: NEGATIVE
BLOOD, URINE: ABNORMAL
BUN BLDV-MCNC: 20 MG/DL (ref 7–22)
CALCIUM SERPL-MCNC: 8.2 MG/DL (ref 8.5–10.5)
CASTS 2: ABNORMAL /LPF
CASTS UA: ABNORMAL /LPF
CHARACTER, URINE: CLEAR
CHLORIDE BLD-SCNC: 109 MEQ/L (ref 98–111)
CO2: 23 MEQ/L (ref 23–33)
COLOR: YELLOW
CREAT SERPL-MCNC: 1.7 MG/DL (ref 0.4–1.2)
CRYSTALS, UA: ABNORMAL
EOSINOPHIL # BLD: 0 %
EOSINOPHILS ABSOLUTE: 0 THOU/MM3 (ref 0–0.4)
EPITHELIAL CELLS, UA: ABNORMAL /HPF
ERYTHROCYTE [DISTWIDTH] IN BLOOD BY AUTOMATED COUNT: 14 % (ref 11.5–14.5)
ERYTHROCYTE [DISTWIDTH] IN BLOOD BY AUTOMATED COUNT: 52.5 FL (ref 35–45)
GFR SERPL CREATININE-BSD FRML MDRD: 31 ML/MIN/1.73M2
GLUCOSE BLD-MCNC: 216 MG/DL (ref 70–108)
GLUCOSE BLD-MCNC: 216 MG/DL (ref 70–108)
GLUCOSE BLD-MCNC: 226 MG/DL (ref 70–108)
GLUCOSE BLD-MCNC: 237 MG/DL (ref 70–108)
GLUCOSE BLD-MCNC: 237 MG/DL (ref 70–108)
GLUCOSE URINE: NEGATIVE MG/DL
HCT VFR BLD CALC: 33.2 % (ref 37–47)
HEMOGLOBIN: 10.4 GM/DL (ref 12–16)
IMMATURE GRANS (ABS): 0.01 THOU/MM3 (ref 0–0.07)
IMMATURE GRANULOCYTES: 0 %
KETONES, URINE: NEGATIVE
LEUKOCYTE ESTERASE, URINE: NEGATIVE
LYMPHOCYTES # BLD: 9.6 %
LYMPHOCYTES ABSOLUTE: 0.4 THOU/MM3 (ref 1–4.8)
MCH RBC QN AUTO: 32 PG (ref 26–33)
MCHC RBC AUTO-ENTMCNC: 31.3 GM/DL (ref 32.2–35.5)
MCV RBC AUTO: 102.2 FL (ref 81–99)
MISCELLANEOUS 2: ABNORMAL
MONOCYTES # BLD: 10.4 %
MONOCYTES ABSOLUTE: 0.4 THOU/MM3 (ref 0.4–1.3)
NITRITE, URINE: NEGATIVE
NUCLEATED RED BLOOD CELLS: 0 /100 WBC
ORGANISM: ABNORMAL
PH UA: 5.5 (ref 5–9)
PLATELET # BLD: 97 THOU/MM3 (ref 130–400)
PMV BLD AUTO: 9.8 FL (ref 9.4–12.4)
POTASSIUM SERPL-SCNC: 4.2 MEQ/L (ref 3.5–5.2)
PROTEIN UA: NEGATIVE
RBC # BLD: 3.25 MILL/MM3 (ref 4.2–5.4)
RBC URINE: ABNORMAL /HPF
RENAL EPITHELIAL, UA: ABNORMAL
SEG NEUTROPHILS: 79.4 %
SEGMENTED NEUTROPHILS ABSOLUTE COUNT: 2.9 THOU/MM3 (ref 1.8–7.7)
SODIUM BLD-SCNC: 142 MEQ/L (ref 135–145)
SPECIFIC GRAVITY, URINE: 1.01 (ref 1–1.03)
T3 FREE: 0.94 PG/ML (ref 2.02–4.43)
TOTAL PROTEIN: 6 G/DL (ref 6.1–8)
URINE CULTURE REFLEX: ABNORMAL
UROBILINOGEN, URINE: 0.2 EU/DL (ref 0–1)
WBC # BLD: 3.7 THOU/MM3 (ref 4.8–10.8)
WBC UA: ABNORMAL /HPF
YEAST: ABNORMAL

## 2019-08-24 PROCEDURE — 2709999900 HC NON-CHARGEABLE SUPPLY

## 2019-08-24 PROCEDURE — 82248 BILIRUBIN DIRECT: CPT

## 2019-08-24 PROCEDURE — 6370000000 HC RX 637 (ALT 250 FOR IP): Performed by: INTERNAL MEDICINE

## 2019-08-24 PROCEDURE — 6360000002 HC RX W HCPCS: Performed by: NURSE PRACTITIONER

## 2019-08-24 PROCEDURE — 86376 MICROSOMAL ANTIBODY EACH: CPT

## 2019-08-24 PROCEDURE — 6360000002 HC RX W HCPCS: Performed by: INTERNAL MEDICINE

## 2019-08-24 PROCEDURE — 1200000000 HC SEMI PRIVATE

## 2019-08-24 PROCEDURE — 84481 FREE ASSAY (FT-3): CPT

## 2019-08-24 PROCEDURE — 82948 REAGENT STRIP/BLOOD GLUCOSE: CPT

## 2019-08-24 PROCEDURE — P9047 ALBUMIN (HUMAN), 25%, 50ML: HCPCS | Performed by: NURSE PRACTITIONER

## 2019-08-24 PROCEDURE — 86800 THYROGLOBULIN ANTIBODY: CPT

## 2019-08-24 PROCEDURE — 6370000000 HC RX 637 (ALT 250 FOR IP): Performed by: PHYSICIAN ASSISTANT

## 2019-08-24 PROCEDURE — 36415 COLL VENOUS BLD VENIPUNCTURE: CPT

## 2019-08-24 PROCEDURE — 2580000003 HC RX 258: Performed by: PHYSICIAN ASSISTANT

## 2019-08-24 PROCEDURE — 80053 COMPREHEN METABOLIC PANEL: CPT

## 2019-08-24 PROCEDURE — 85025 COMPLETE CBC W/AUTO DIFF WBC: CPT

## 2019-08-24 PROCEDURE — 49083 ABD PARACENTESIS W/IMAGING: CPT

## 2019-08-24 PROCEDURE — 6360000002 HC RX W HCPCS: Performed by: PHYSICIAN ASSISTANT

## 2019-08-24 PROCEDURE — 2580000003 HC RX 258: Performed by: INTERNAL MEDICINE

## 2019-08-24 PROCEDURE — 99233 SBSQ HOSP IP/OBS HIGH 50: CPT | Performed by: INTERNAL MEDICINE

## 2019-08-24 RX ORDER — LEVOTHYROXINE SODIUM 0.15 MG/1
300 TABLET ORAL DAILY
Status: COMPLETED | OUTPATIENT
Start: 2019-08-24 | End: 2019-08-27

## 2019-08-24 RX ORDER — ALBUMIN (HUMAN) 12.5 G/50ML
12.5 SOLUTION INTRAVENOUS ONCE
Status: DISCONTINUED | OUTPATIENT
Start: 2019-08-24 | End: 2019-08-24

## 2019-08-24 RX ORDER — ALBUMIN (HUMAN) 12.5 G/50ML
25 SOLUTION INTRAVENOUS ONCE
Status: DISCONTINUED | OUTPATIENT
Start: 2019-08-24 | End: 2019-08-24

## 2019-08-24 RX ORDER — ALBUMIN (HUMAN) 12.5 G/50ML
25 SOLUTION INTRAVENOUS ONCE
Status: COMPLETED | OUTPATIENT
Start: 2019-08-24 | End: 2019-08-24

## 2019-08-24 RX ADMIN — SUCRALFATE 1 G: 1 TABLET ORAL at 06:05

## 2019-08-24 RX ADMIN — INSULIN LISPRO 2 UNITS: 100 INJECTION, SOLUTION INTRAVENOUS; SUBCUTANEOUS at 17:46

## 2019-08-24 RX ADMIN — SERTRALINE 50 MG: 50 TABLET, FILM COATED ORAL at 09:13

## 2019-08-24 RX ADMIN — SPIRONOLACTONE 50 MG: 25 TABLET ORAL at 09:13

## 2019-08-24 RX ADMIN — SODIUM CHLORIDE, PRESERVATIVE FREE 10 ML: 5 INJECTION INTRAVENOUS at 20:24

## 2019-08-24 RX ADMIN — SUCRALFATE 1 G: 1 TABLET ORAL at 09:13

## 2019-08-24 RX ADMIN — ENOXAPARIN SODIUM 40 MG: 40 INJECTION SUBCUTANEOUS at 09:16

## 2019-08-24 RX ADMIN — INSULIN LISPRO 2 UNITS: 100 INJECTION, SOLUTION INTRAVENOUS; SUBCUTANEOUS at 09:18

## 2019-08-24 RX ADMIN — CEFTRIAXONE SODIUM 1 G: 1 INJECTION, POWDER, FOR SOLUTION INTRAMUSCULAR; INTRAVENOUS at 20:28

## 2019-08-24 RX ADMIN — SUCRALFATE 1 G: 1 TABLET ORAL at 20:23

## 2019-08-24 RX ADMIN — ALBUMIN (HUMAN) 25 G: 0.25 INJECTION, SOLUTION INTRAVENOUS at 16:24

## 2019-08-24 RX ADMIN — SUCRALFATE 1 G: 1 TABLET ORAL at 16:02

## 2019-08-24 RX ADMIN — SIMVASTATIN 40 MG: 40 TABLET, FILM COATED ORAL at 20:23

## 2019-08-24 RX ADMIN — SODIUM CHLORIDE, PRESERVATIVE FREE 10 ML: 5 INJECTION INTRAVENOUS at 09:24

## 2019-08-24 RX ADMIN — INSULIN GLARGINE 5 UNITS: 100 INJECTION, SOLUTION SUBCUTANEOUS at 09:17

## 2019-08-24 RX ADMIN — INSULIN LISPRO 1 UNITS: 100 INJECTION, SOLUTION INTRAVENOUS; SUBCUTANEOUS at 20:25

## 2019-08-24 RX ADMIN — LEVOTHYROXINE SODIUM 300 MCG: 150 TABLET ORAL at 09:13

## 2019-08-24 RX ADMIN — INSULIN LISPRO 2 UNITS: 100 INJECTION, SOLUTION INTRAVENOUS; SUBCUTANEOUS at 12:56

## 2019-08-24 ASSESSMENT — PAIN SCALES - GENERAL
PAINLEVEL_OUTOF10: 0
PAINLEVEL_OUTOF10: 3
PAINLEVEL_OUTOF10: 0

## 2019-08-24 NOTE — PLAN OF CARE
Problem: Pain:  Goal: Pain level will decrease  Description  Pain level will decrease  8/24/2019 1703 by Minerva Cohen RN  Note:   Denies any pain. Problem: Falls - Risk of:  Goal: Will remain free from falls  Description  Will remain free from falls  8/24/2019 1703 by Minerva Cohen RN  Note:   No fall this shift, out of bed to commode with standby assist. Gait is steady. Tolerates only short distances. In bed with alarm on, call light in reach and door to room open. Problem: Respiratory  Goal: O2 Sat > 90%  8/24/2019 1703 by Minerva Cohen RN  Note:   Oxygen remains WNL on room air, denies SOB at rest. Some SOB with exertion and when [facility] is flat. Problem: GI  Goal: No bowel complications  2/95/4423 1703 by Minerva Cohen RN  Note:   Active BS, passing gas and stool. Problem: Nutrition  Goal: Optimal nutrition therapy  8/24/2019 1703 by Minerva Cohen RN  Note:   Appetite is fair. Patient does not like the glucerna. Problem: DISCHARGE BARRIERS  Goal: Patient's continuum of care needs are met  8/24/2019 1703 by Minerva Cohen RN  Note:   Home with daughter and [place]     Problem: Serum Glucose Level - Abnormal:  Goal: Ability to maintain appropriate glucose levels has stabilized  Description  Ability to maintain appropriate glucose levels has stabilized  8/24/2019 1703 by Minevra Cohen RN  Note:   BS levels greater than 200 today, covering with SCI prn. Care plan reviewed with patient. Patient verbalizes understanding of the plan of care and contributes to goal setting.

## 2019-08-24 NOTE — PROGRESS NOTES
Hospitalist Progress Note    Patient:  Chemo Patient      Unit/Bed:5K-18/018-A    YOB: 1960    MRN: [id number]       Acct: [de-identified]     PCP: Delbert Ochoa PA-C    Date of Admission: 8/21/2019      Assessment/Plan:  Active Hospital Problems    Diagnosis Date Noted    HTN (hypertension) [I10] 08/22/2019    LUQ abdominal pain [R10.12] 08/22/2019    Ascites [R18.8] 10/01/6818    Alcoholic cirrhosis of liver with ascites ([facility] [address] [K70.31] 05/16/2019    Type 2 diabetes mellitus with stage 3 chronic kidney disease, without long-term current use of insulin ([facility] [address] [E11.22, N18.3] 05/16/2019    Hypothyroid [E03.9] 05/16/2019       Decompensated Alcoholic Cirrhosis with ascites - due to non-compliance. Last paracentesis was 8/2/19 during hospitalization but has not been taking diuretics (nausea). - monitor liver labs  - therapeutic/diagnostic para 8/22 with 5L removed   - albumin per GI   - Cell count not supportive of SBP   - follow gram stain/culture  - repeat para today  - monitor creatinine  - low Na diet and fluid restrict. RAMBO vs CKD 3 - near baseline, will continue to monitor. Likely due to abdominal distension/renal congestion. Avoid nephrotoxins. Give albumin post para to prevent HRS. Restart diuretics. Stop IVF. Macrocytic Anemia - vitamin B12 and folate wnl 8/2/19. Likely related to liver disease and etoh history. At baseline. Assymetric LE edema - will check LE doppler, No DVT    Pancytopenia - seems slightly improved from recent hospital stay. A result of HSM and cirrhosis. Will monitor. Depression - continue sertraline    DM2 - hold glimeperide, continue on SSI with accuchecks for now. Lantus 10 u qhs. Severe Hypothyroidism - with notoriously poor control/overt hypothyroidism due to noncompliance. , Free T4 0.4. Will consult endo. Remote EtOH abuse quit \"21 years ago\"    Poor Medical Compliance - sw to be involved.  Attempt to get GI

## 2019-08-24 NOTE — H&P
Formulation and discussion of sedation / procedure plans, risks, benefits, side effects and alternatives with patient and/or responsible adult completed.     Electronically signed by Aurea Suazo MD on 8/24/2019 at 3:16 PM

## 2019-08-24 NOTE — PROGRESS NOTES
Called US to see what time paracentesis will be today. Spoke with Gerard Ruby, he will call back when they are able to see patient for procedure.

## 2019-08-24 NOTE — PROGRESS NOTES
RN attempted to call Dr. Jannice Closs regarding patient's levothyroxine injection 300mcg IV. RN left a message for Dr. Jannice Closs to call back at [phone].

## 2019-08-25 LAB
ALBUMIN SERPL-MCNC: 2.9 G/DL (ref 3.5–5.1)
ALP BLD-CCNC: 100 U/L (ref 38–126)
ALT SERPL-CCNC: 11 U/L (ref 11–66)
ANION GAP SERPL CALCULATED.3IONS-SCNC: 10 MEQ/L (ref 8–16)
AST SERPL-CCNC: 19 U/L (ref 5–40)
BILIRUB SERPL-MCNC: 0.3 MG/DL (ref 0.3–1.2)
BUN BLDV-MCNC: 18 MG/DL (ref 7–22)
CALCIUM SERPL-MCNC: 8.2 MG/DL (ref 8.5–10.5)
CHLORIDE BLD-SCNC: 109 MEQ/L (ref 98–111)
CO2: 22 MEQ/L (ref 23–33)
CREAT SERPL-MCNC: 1.5 MG/DL (ref 0.4–1.2)
ERYTHROCYTE [DISTWIDTH] IN BLOOD BY AUTOMATED COUNT: 13.7 % (ref 11.5–14.5)
ERYTHROCYTE [DISTWIDTH] IN BLOOD BY AUTOMATED COUNT: 53.3 FL (ref 35–45)
GFR SERPL CREATININE-BSD FRML MDRD: 35 ML/MIN/1.73M2
GLUCOSE BLD-MCNC: 178 MG/DL (ref 70–108)
GLUCOSE BLD-MCNC: 179 MG/DL (ref 70–108)
GLUCOSE BLD-MCNC: 272 MG/DL (ref 70–108)
GLUCOSE BLD-MCNC: 289 MG/DL (ref 70–108)
GLUCOSE BLD-MCNC: 292 MG/DL (ref 70–108)
HCT VFR BLD CALC: 33.8 % (ref 37–47)
HEMOGLOBIN: 10.3 GM/DL (ref 12–16)
INR BLD: 1.21 (ref 0.85–1.13)
MCH RBC QN AUTO: 32.3 PG (ref 26–33)
MCHC RBC AUTO-ENTMCNC: 30.5 GM/DL (ref 32.2–35.5)
MCV RBC AUTO: 106 FL (ref 81–99)
ORGANISM: ABNORMAL
PLATELET # BLD: 90 THOU/MM3 (ref 130–400)
PMV BLD AUTO: 10.3 FL (ref 9.4–12.4)
POTASSIUM SERPL-SCNC: 4.1 MEQ/L (ref 3.5–5.2)
RBC # BLD: 3.19 MILL/MM3 (ref 4.2–5.4)
SODIUM BLD-SCNC: 141 MEQ/L (ref 135–145)
TOTAL PROTEIN: 5.7 G/DL (ref 6.1–8)
URINE CULTURE REFLEX: ABNORMAL
WBC # BLD: 3 THOU/MM3 (ref 4.8–10.8)

## 2019-08-25 PROCEDURE — 80053 COMPREHEN METABOLIC PANEL: CPT

## 2019-08-25 PROCEDURE — 85027 COMPLETE CBC AUTOMATED: CPT

## 2019-08-25 PROCEDURE — 85610 PROTHROMBIN TIME: CPT

## 2019-08-25 PROCEDURE — 6370000000 HC RX 637 (ALT 250 FOR IP): Performed by: PHYSICIAN ASSISTANT

## 2019-08-25 PROCEDURE — 6370000000 HC RX 637 (ALT 250 FOR IP): Performed by: INTERNAL MEDICINE

## 2019-08-25 PROCEDURE — 6360000002 HC RX W HCPCS: Performed by: INTERNAL MEDICINE

## 2019-08-25 PROCEDURE — 1200000000 HC SEMI PRIVATE

## 2019-08-25 PROCEDURE — 82948 REAGENT STRIP/BLOOD GLUCOSE: CPT

## 2019-08-25 PROCEDURE — 0W9G3ZZ DRAINAGE OF PERITONEAL CAVITY, PERCUTANEOUS APPROACH: ICD-10-PCS | Performed by: RADIOLOGY

## 2019-08-25 PROCEDURE — 6370000000 HC RX 637 (ALT 250 FOR IP): Performed by: NURSE PRACTITIONER

## 2019-08-25 PROCEDURE — 6360000002 HC RX W HCPCS: Performed by: PHYSICIAN ASSISTANT

## 2019-08-25 PROCEDURE — 36415 COLL VENOUS BLD VENIPUNCTURE: CPT

## 2019-08-25 PROCEDURE — 2580000003 HC RX 258: Performed by: PHYSICIAN ASSISTANT

## 2019-08-25 PROCEDURE — 99232 SBSQ HOSP IP/OBS MODERATE 35: CPT | Performed by: INTERNAL MEDICINE

## 2019-08-25 PROCEDURE — 2580000003 HC RX 258: Performed by: INTERNAL MEDICINE

## 2019-08-25 RX ORDER — CAPSAICIN 0.025 %
CREAM (GRAM) TOPICAL 2 TIMES DAILY
Status: DISCONTINUED | OUTPATIENT
Start: 2019-08-25 | End: 2019-08-29 | Stop reason: HOSPADM

## 2019-08-25 RX ORDER — DOCUSATE SODIUM 100 MG/1
100 CAPSULE, LIQUID FILLED ORAL DAILY
Status: DISCONTINUED | OUTPATIENT
Start: 2019-08-25 | End: 2019-08-27

## 2019-08-25 RX ADMIN — SUCRALFATE 1 G: 1 TABLET ORAL at 08:38

## 2019-08-25 RX ADMIN — INSULIN LISPRO 2 UNITS: 100 INJECTION, SOLUTION INTRAVENOUS; SUBCUTANEOUS at 22:11

## 2019-08-25 RX ADMIN — INSULIN LISPRO 3 UNITS: 100 INJECTION, SOLUTION INTRAVENOUS; SUBCUTANEOUS at 12:16

## 2019-08-25 RX ADMIN — SPIRONOLACTONE 50 MG: 25 TABLET ORAL at 08:38

## 2019-08-25 RX ADMIN — SIMVASTATIN 40 MG: 40 TABLET, FILM COATED ORAL at 19:57

## 2019-08-25 RX ADMIN — CEFTRIAXONE SODIUM 1 G: 1 INJECTION, POWDER, FOR SOLUTION INTRAMUSCULAR; INTRAVENOUS at 19:57

## 2019-08-25 RX ADMIN — INSULIN LISPRO 1 UNITS: 100 INJECTION, SOLUTION INTRAVENOUS; SUBCUTANEOUS at 08:41

## 2019-08-25 RX ADMIN — SODIUM CHLORIDE, PRESERVATIVE FREE 10 ML: 5 INJECTION INTRAVENOUS at 19:58

## 2019-08-25 RX ADMIN — INSULIN LISPRO 3 UNITS: 100 INJECTION, SOLUTION INTRAVENOUS; SUBCUTANEOUS at 17:34

## 2019-08-25 RX ADMIN — CAPSAICIN: 0.25 CREAM TOPICAL at 15:03

## 2019-08-25 RX ADMIN — SUCRALFATE 1 G: 1 TABLET ORAL at 12:14

## 2019-08-25 RX ADMIN — SUCRALFATE 1 G: 1 TABLET ORAL at 19:57

## 2019-08-25 RX ADMIN — SERTRALINE 50 MG: 50 TABLET, FILM COATED ORAL at 08:38

## 2019-08-25 RX ADMIN — DOCUSATE SODIUM 100 MG: 100 CAPSULE, LIQUID FILLED ORAL at 12:14

## 2019-08-25 RX ADMIN — ENOXAPARIN SODIUM 40 MG: 40 INJECTION SUBCUTANEOUS at 08:38

## 2019-08-25 RX ADMIN — INSULIN GLARGINE 5 UNITS: 100 INJECTION, SOLUTION SUBCUTANEOUS at 08:41

## 2019-08-25 RX ADMIN — SUCRALFATE 1 G: 1 TABLET ORAL at 16:30

## 2019-08-25 RX ADMIN — CAPSAICIN: 0.25 CREAM TOPICAL at 19:57

## 2019-08-25 RX ADMIN — LEVOTHYROXINE SODIUM 300 MCG: 150 TABLET ORAL at 08:38

## 2019-08-25 ASSESSMENT — PAIN DESCRIPTION - ORIENTATION
ORIENTATION: RIGHT;LEFT
ORIENTATION: RIGHT;LEFT

## 2019-08-25 ASSESSMENT — PAIN SCALES - GENERAL
PAINLEVEL_OUTOF10: 7
PAINLEVEL_OUTOF10: 0
PAINLEVEL_OUTOF10: 5
PAINLEVEL_OUTOF10: 0

## 2019-08-25 ASSESSMENT — PAIN DESCRIPTION - ONSET
ONSET: SUDDEN
ONSET: ON-GOING

## 2019-08-25 ASSESSMENT — PAIN DESCRIPTION - PROGRESSION
CLINICAL_PROGRESSION: GRADUALLY WORSENING
CLINICAL_PROGRESSION: GRADUALLY WORSENING

## 2019-08-25 ASSESSMENT — PAIN DESCRIPTION - FREQUENCY
FREQUENCY: CONTINUOUS
FREQUENCY: CONTINUOUS

## 2019-08-25 ASSESSMENT — PAIN DESCRIPTION - DESCRIPTORS
DESCRIPTORS: BURNING;TINGLING
DESCRIPTORS: BURNING;TINGLING

## 2019-08-25 ASSESSMENT — PAIN DESCRIPTION - PAIN TYPE
TYPE: ACUTE PAIN
TYPE: ACUTE PAIN

## 2019-08-25 ASSESSMENT — PAIN DESCRIPTION - DIRECTION: RADIATING_TOWARDS: NO

## 2019-08-25 ASSESSMENT — PAIN - FUNCTIONAL ASSESSMENT
PAIN_FUNCTIONAL_ASSESSMENT: ACTIVITIES ARE NOT PREVENTED
PAIN_FUNCTIONAL_ASSESSMENT: ACTIVITIES ARE NOT PREVENTED

## 2019-08-25 ASSESSMENT — PAIN DESCRIPTION - LOCATION
LOCATION: FOOT
LOCATION: FOOT

## 2019-08-26 LAB
ALBUMIN SERPL-MCNC: 3 G/DL (ref 3.5–5.1)
ALP BLD-CCNC: 106 U/L (ref 38–126)
ALT SERPL-CCNC: 15 U/L (ref 11–66)
ANION GAP SERPL CALCULATED.3IONS-SCNC: 10 MEQ/L (ref 8–16)
AST SERPL-CCNC: 23 U/L (ref 5–40)
BILIRUB SERPL-MCNC: 0.4 MG/DL (ref 0.3–1.2)
BUN BLDV-MCNC: 17 MG/DL (ref 7–22)
CALCIUM SERPL-MCNC: 8.5 MG/DL (ref 8.5–10.5)
CHLORIDE BLD-SCNC: 109 MEQ/L (ref 98–111)
CO2: 22 MEQ/L (ref 23–33)
CREAT SERPL-MCNC: 1.5 MG/DL (ref 0.4–1.2)
ERYTHROCYTE [DISTWIDTH] IN BLOOD BY AUTOMATED COUNT: 14 % (ref 11.5–14.5)
ERYTHROCYTE [DISTWIDTH] IN BLOOD BY AUTOMATED COUNT: 51.7 FL (ref 35–45)
GFR SERPL CREATININE-BSD FRML MDRD: 35 ML/MIN/1.73M2
GLUCOSE BLD-MCNC: 209 MG/DL (ref 70–108)
GLUCOSE BLD-MCNC: 216 MG/DL (ref 70–108)
GLUCOSE BLD-MCNC: 229 MG/DL (ref 70–108)
GLUCOSE BLD-MCNC: 231 MG/DL (ref 70–108)
GLUCOSE BLD-MCNC: 249 MG/DL (ref 70–108)
HCT VFR BLD CALC: 33.6 % (ref 37–47)
HEMOGLOBIN: 10.7 GM/DL (ref 12–16)
INR BLD: 1.2 (ref 0.85–1.13)
MCH RBC QN AUTO: 32 PG (ref 26–33)
MCHC RBC AUTO-ENTMCNC: 31.8 GM/DL (ref 32.2–35.5)
MCV RBC AUTO: 100.6 FL (ref 81–99)
PLATELET # BLD: 88 THOU/MM3 (ref 130–400)
PMV BLD AUTO: 9.9 FL (ref 9.4–12.4)
POTASSIUM SERPL-SCNC: 4.5 MEQ/L (ref 3.5–5.2)
RBC # BLD: 3.34 MILL/MM3 (ref 4.2–5.4)
SODIUM BLD-SCNC: 141 MEQ/L (ref 135–145)
T4 FREE: 0.72 NG/DL (ref 0.93–1.76)
THYROGLOBULIN AB: 310.6 IU/ML (ref 0–4)
THYROID PEROXIDASE ANTIBODY: 6.8 IU/ML (ref 0–9)
TOTAL PROTEIN: 5.7 G/DL (ref 6.1–8)
WBC # BLD: 3.6 THOU/MM3 (ref 4.8–10.8)

## 2019-08-26 PROCEDURE — 6370000000 HC RX 637 (ALT 250 FOR IP): Performed by: INTERNAL MEDICINE

## 2019-08-26 PROCEDURE — 1200000000 HC SEMI PRIVATE

## 2019-08-26 PROCEDURE — 85610 PROTHROMBIN TIME: CPT

## 2019-08-26 PROCEDURE — 85027 COMPLETE CBC AUTOMATED: CPT

## 2019-08-26 PROCEDURE — 6360000002 HC RX W HCPCS: Performed by: INTERNAL MEDICINE

## 2019-08-26 PROCEDURE — 97110 THERAPEUTIC EXERCISES: CPT

## 2019-08-26 PROCEDURE — 6370000000 HC RX 637 (ALT 250 FOR IP): Performed by: NURSE PRACTITIONER

## 2019-08-26 PROCEDURE — 80053 COMPREHEN METABOLIC PANEL: CPT

## 2019-08-26 PROCEDURE — 84439 ASSAY OF FREE THYROXINE: CPT

## 2019-08-26 PROCEDURE — 36415 COLL VENOUS BLD VENIPUNCTURE: CPT

## 2019-08-26 PROCEDURE — 6360000002 HC RX W HCPCS: Performed by: PHYSICIAN ASSISTANT

## 2019-08-26 PROCEDURE — 2580000003 HC RX 258: Performed by: INTERNAL MEDICINE

## 2019-08-26 PROCEDURE — 97162 PT EVAL MOD COMPLEX 30 MIN: CPT

## 2019-08-26 PROCEDURE — 99233 SBSQ HOSP IP/OBS HIGH 50: CPT | Performed by: INTERNAL MEDICINE

## 2019-08-26 PROCEDURE — 2580000003 HC RX 258: Performed by: PHYSICIAN ASSISTANT

## 2019-08-26 PROCEDURE — 6370000000 HC RX 637 (ALT 250 FOR IP): Performed by: PHYSICIAN ASSISTANT

## 2019-08-26 PROCEDURE — 82948 REAGENT STRIP/BLOOD GLUCOSE: CPT

## 2019-08-26 RX ORDER — FUROSEMIDE 10 MG/ML
40 INJECTION INTRAMUSCULAR; INTRAVENOUS
Status: DISCONTINUED | OUTPATIENT
Start: 2019-08-26 | End: 2019-08-28

## 2019-08-26 RX ADMIN — SUCRALFATE 1 G: 1 TABLET ORAL at 20:44

## 2019-08-26 RX ADMIN — SPIRONOLACTONE 50 MG: 25 TABLET ORAL at 08:56

## 2019-08-26 RX ADMIN — INSULIN GLARGINE 5 UNITS: 100 INJECTION, SOLUTION SUBCUTANEOUS at 06:17

## 2019-08-26 RX ADMIN — SERTRALINE 50 MG: 50 TABLET, FILM COATED ORAL at 08:56

## 2019-08-26 RX ADMIN — CAPSAICIN: 0.25 CREAM TOPICAL at 20:44

## 2019-08-26 RX ADMIN — FUROSEMIDE 40 MG: 10 INJECTION, SOLUTION INTRAMUSCULAR; INTRAVENOUS at 15:54

## 2019-08-26 RX ADMIN — SUCRALFATE 1 G: 1 TABLET ORAL at 17:19

## 2019-08-26 RX ADMIN — ENOXAPARIN SODIUM 40 MG: 40 INJECTION SUBCUTANEOUS at 09:00

## 2019-08-26 RX ADMIN — SUCRALFATE 1 G: 1 TABLET ORAL at 06:21

## 2019-08-26 RX ADMIN — INSULIN LISPRO 2 UNITS: 100 INJECTION, SOLUTION INTRAVENOUS; SUBCUTANEOUS at 08:56

## 2019-08-26 RX ADMIN — SIMVASTATIN 40 MG: 40 TABLET, FILM COATED ORAL at 20:44

## 2019-08-26 RX ADMIN — CEFTRIAXONE SODIUM 1 G: 1 INJECTION, POWDER, FOR SOLUTION INTRAMUSCULAR; INTRAVENOUS at 20:44

## 2019-08-26 RX ADMIN — INSULIN LISPRO 1 UNITS: 100 INJECTION, SOLUTION INTRAVENOUS; SUBCUTANEOUS at 20:44

## 2019-08-26 RX ADMIN — LEVOTHYROXINE SODIUM 300 MCG: 150 TABLET ORAL at 06:21

## 2019-08-26 RX ADMIN — SUCRALFATE 1 G: 1 TABLET ORAL at 11:23

## 2019-08-26 RX ADMIN — CAPSAICIN: 0.25 CREAM TOPICAL at 08:56

## 2019-08-26 RX ADMIN — SODIUM CHLORIDE, PRESERVATIVE FREE 10 ML: 5 INJECTION INTRAVENOUS at 08:56

## 2019-08-26 RX ADMIN — INSULIN LISPRO 2 UNITS: 100 INJECTION, SOLUTION INTRAVENOUS; SUBCUTANEOUS at 17:19

## 2019-08-26 RX ADMIN — INSULIN LISPRO 2 UNITS: 100 INJECTION, SOLUTION INTRAVENOUS; SUBCUTANEOUS at 12:29

## 2019-08-26 RX ADMIN — DOCUSATE SODIUM 100 MG: 100 CAPSULE, LIQUID FILLED ORAL at 08:56

## 2019-08-26 ASSESSMENT — PAIN SCALES - GENERAL
PAINLEVEL_OUTOF10: 0

## 2019-08-26 NOTE — PROGRESS NOTES
history, completion of standardized testing, formal and informal observation of tasks, assessment of data and development of plan of care and goals. Treatment time included skilled education and facilitation of tasks to increase safety and independence with functional mobility for improved independence and quality of life. Assessment: Body structures, Functions, Activity limitations: Decreased strength, Decreased safe awareness, Decreased endurance, Decreased functional mobility , Decreased balance  Assessment: Pt demonstrates a decrease in baseline by way of transfers, bed mobility and ambulation secondary to the above deficits. Pt demonstrates decreased activity tolerance, limiting her ability to ambulate household distances. Unsteady gait noted with pt reaching to furniture/walls. Pt provided 2WW for improved functional mobility and safety with improved balance with gait. Pt will benefit from skilled PT services during admission and post d/c for improved functional I and safety with mobility.    Prognosis: Good    REQUIRES PT FOLLOW UP: Yes    Discharge Recommendations:  Discharge Recommendations: Continue to assess pending progress, Home with assist PRN, Home with Home health PT    Patient Education:  PT Education: PT Role, Home Exercise Program, Plan of Care    Equipment Recommendations:  Equipment Needed: No    Plan:  Times per week: 5xGM  Current Treatment Recommendations: Transfer Training, Endurance Training, Strengthening, ROM, Balance Training, Gait Training, Home Exercise Program, Functional Mobility Training, Stair training, Safety Education & Training, Neuromuscular Re-education, Patient/Caregiver Education & Training    Goals:  Patient goals : return home with dtr  Short term goals  Time Frame for Short term goals: by discharge  Short term goal 1: bed mobility with mod I for ease of getting in/out of bed  Short term goal 2: sit <> stand with supervision A for ease of transfers  Short term goal 3:

## 2019-08-26 NOTE — PROGRESS NOTES
Hospitalist Progress Note    Patient:  Sarah Ordonez      Unit/Bed:5K-18/018-A    YOB: 1960    MRN: [id number]       Acct: [de-identified]     PCP: Ricardo Aguirre PA-C    Date of Admission: 8/21/2019      Assessment/Plan:  Active Hospital Problems    Diagnosis Date Noted    HTN (hypertension) [I10] 08/22/2019    LUQ abdominal pain [R10.12] 08/22/2019    Ascites [R18.8] 14/99/0536    Alcoholic cirrhosis of liver with ascites ([facility] [address] [K70.31] 05/16/2019    Type 2 diabetes mellitus with stage 3 chronic kidney disease, without long-term current use of insulin ([address] [E11.22, N18.3] 05/16/2019    Hypothyroid [E03.9] 05/16/2019       Decompensated Alcoholic Cirrhosis with ascites - due to non-compliance. Last paracentesis was 8/2/19 during hospitalization but has not been taking diuretics (nausea). Has had ~10L of ascites taken off this admission. Her ascites is improved but still significantly there. - monitor liver labs  - therapeutic/diagnostic para 8/22 with 5L removed. 5.75 removed on 8/24   - albumin per GI   - Cell count not supportive of SBP   - follow gram stain/culture  - monitor creatinine  - low Na diet and fluid restrict. - added back on lasix. Abdomen very distended. May need additional para? Will see how adding lasix helps. RAMBO vs CKD 3 - near baseline, will continue to monitor. Likely due to abdominal distension/renal congestion. Avoid nephrotoxins. Give albumin post para to prevent HRS. Restart diuretics. Stop IVF. Improving    Macrocytic Anemia - vitamin B12 and folate wnl 8/2/19. Likely related to liver disease and etoh history. At baseline. Assymetric LE edema - will check LE doppler, No DVT    Pancytopenia - seems slightly improved from recent hospital stay. A result of HSM and cirrhosis. Will monitor. Depression - continue sertraline    DM2 - hold glimeperide, continue on SSI with accuchecks for now. Lantus 10 u qhs. Has neuropathic pain.  Will try

## 2019-08-26 NOTE — PLAN OF CARE
Problem: Pain:  Goal: Pain level will decrease  Description  Pain level will decrease  8/26/2019 0209 by hPilippe Juarez RN  Outcome: Ongoing  Note:   Patient complains of tingling in the feet due to nueropathy. No complaints of pain this shift. Will continue to monitor. Problem: Falls - Risk of:  Goal: Will remain free from falls  Description  Will remain free from falls  8/26/2019 0209 by Philippe Juarez RN  Outcome: Ongoing  Note:   Patient gets up with help to the bedside commode. Patient is compliant with the call light. Patient did not have a fall this shift. Patient was reeducated about use of the call light and not getting up without assistance. Patient belongings were all kept within reach. Side rails were up 2/4 and the bed was in lowest position. Problem: Respiratory  Goal: O2 Sat > 90%  8/26/2019 0209 by Philippe Juarez RN  Outcome: Ongoing  Note:   Oxygen saturation within normal limits this shift. Problem: GI  Goal: No bowel complications  0/55/0709 0209 by Philippe Juarez RN  Outcome: Ongoing  Note:   Abdomen distended due to ascites. Bowel sounds active in all four quadrants. No bowel movement since 8/23, scheduled colace given. Problem: Discharge Planning:  Goal: Participates in care planning  Description  Participates in care planning  8/26/2019 0209 by Philippe Juarez RN  Outcome: Ongoing  Note:   Upon discharge, patient plans to return to home where she resides with her daughter. The patient was allowed to remain at the highest level of independent function in anticipation of future discharge. The patient's ADL's were performed by the patient as much as possible and the patient is an active participant in the plan of care. Problem: Nutrition  Goal: Optimal nutrition therapy  8/26/2019 0209 by Philippe Juarez RN  Outcome: Ongoing  Note:   Patient is on a low sodium diet and tolerating well.      Problem: Serum Glucose Level - Abnormal:  Goal: Ability to maintain appropriate glucose levels has stabilized  Description  Ability to maintain appropriate glucose levels has stabilized  8/26/2019 0209 by Venkatesh Sloan RN  Outcome: Ongoing  Note:   Glucose levels elevated this shift, 2 units sliding scale humalog given. Care plan reviewed with patient. Patient verbalized understanding of the plan of care and contributes to goal setting.     Electronically signed by Venkatesh Sloan RN on 8/26/2019 at 2:14 AM

## 2019-08-27 LAB
ALBUMIN SERPL-MCNC: 3.1 G/DL (ref 3.5–5.1)
ALP BLD-CCNC: 105 U/L (ref 38–126)
ALT SERPL-CCNC: 14 U/L (ref 11–66)
ANAEROBIC CULTURE: NORMAL
ANION GAP SERPL CALCULATED.3IONS-SCNC: 10 MEQ/L (ref 8–16)
AST SERPL-CCNC: 20 U/L (ref 5–40)
BILIRUB SERPL-MCNC: 0.4 MG/DL (ref 0.3–1.2)
BODY FLUID CULTURE, STERILE: NORMAL
BUN BLDV-MCNC: 17 MG/DL (ref 7–22)
CALCIUM SERPL-MCNC: 8.6 MG/DL (ref 8.5–10.5)
CHLORIDE BLD-SCNC: 106 MEQ/L (ref 98–111)
CO2: 24 MEQ/L (ref 23–33)
CREAT SERPL-MCNC: 1.6 MG/DL (ref 0.4–1.2)
ERYTHROCYTE [DISTWIDTH] IN BLOOD BY AUTOMATED COUNT: 13.8 % (ref 11.5–14.5)
ERYTHROCYTE [DISTWIDTH] IN BLOOD BY AUTOMATED COUNT: 51.4 FL (ref 35–45)
GFR SERPL CREATININE-BSD FRML MDRD: 33 ML/MIN/1.73M2
GLUCOSE BLD-MCNC: 182 MG/DL (ref 70–108)
GLUCOSE BLD-MCNC: 186 MG/DL (ref 70–108)
GLUCOSE BLD-MCNC: 195 MG/DL (ref 70–108)
GLUCOSE BLD-MCNC: 220 MG/DL (ref 70–108)
GLUCOSE BLD-MCNC: 258 MG/DL (ref 70–108)
GLUCOSE BLD-MCNC: 316 MG/DL (ref 70–108)
GLUCOSE BLD-MCNC: 370 MG/DL (ref 70–108)
GRAM STAIN RESULT: NORMAL
HCT VFR BLD CALC: 35.2 % (ref 37–47)
HEMOGLOBIN: 11.3 GM/DL (ref 12–16)
INR BLD: 1.16 (ref 0.85–1.13)
MCH RBC QN AUTO: 32.4 PG (ref 26–33)
MCHC RBC AUTO-ENTMCNC: 32.1 GM/DL (ref 32.2–35.5)
MCV RBC AUTO: 100.9 FL (ref 81–99)
PLATELET # BLD: 102 THOU/MM3 (ref 130–400)
PMV BLD AUTO: 10 FL (ref 9.4–12.4)
POTASSIUM SERPL-SCNC: 4.1 MEQ/L (ref 3.5–5.2)
RBC # BLD: 3.49 MILL/MM3 (ref 4.2–5.4)
SODIUM BLD-SCNC: 140 MEQ/L (ref 135–145)
TOTAL PROTEIN: 6.2 G/DL (ref 6.1–8)
WBC # BLD: 4.1 THOU/MM3 (ref 4.8–10.8)

## 2019-08-27 PROCEDURE — 99233 SBSQ HOSP IP/OBS HIGH 50: CPT | Performed by: INTERNAL MEDICINE

## 2019-08-27 PROCEDURE — 97116 GAIT TRAINING THERAPY: CPT

## 2019-08-27 PROCEDURE — 80053 COMPREHEN METABOLIC PANEL: CPT

## 2019-08-27 PROCEDURE — 6370000000 HC RX 637 (ALT 250 FOR IP): Performed by: INTERNAL MEDICINE

## 2019-08-27 PROCEDURE — 1200000000 HC SEMI PRIVATE

## 2019-08-27 PROCEDURE — 6360000002 HC RX W HCPCS: Performed by: INTERNAL MEDICINE

## 2019-08-27 PROCEDURE — 6360000002 HC RX W HCPCS: Performed by: PHYSICIAN ASSISTANT

## 2019-08-27 PROCEDURE — 97530 THERAPEUTIC ACTIVITIES: CPT

## 2019-08-27 PROCEDURE — 36415 COLL VENOUS BLD VENIPUNCTURE: CPT

## 2019-08-27 PROCEDURE — 2580000003 HC RX 258: Performed by: PHYSICIAN ASSISTANT

## 2019-08-27 PROCEDURE — 2580000003 HC RX 258: Performed by: INTERNAL MEDICINE

## 2019-08-27 PROCEDURE — 6370000000 HC RX 637 (ALT 250 FOR IP): Performed by: PHYSICIAN ASSISTANT

## 2019-08-27 PROCEDURE — 85610 PROTHROMBIN TIME: CPT

## 2019-08-27 PROCEDURE — 97535 SELF CARE MNGMENT TRAINING: CPT

## 2019-08-27 PROCEDURE — 85027 COMPLETE CBC AUTOMATED: CPT

## 2019-08-27 PROCEDURE — 82948 REAGENT STRIP/BLOOD GLUCOSE: CPT

## 2019-08-27 RX ORDER — ALBUMIN (HUMAN) 12.5 G/50ML
25 SOLUTION INTRAVENOUS ONCE
Status: COMPLETED | OUTPATIENT
Start: 2019-08-27 | End: 2019-08-28

## 2019-08-27 RX ORDER — DOCUSATE SODIUM 100 MG/1
100 CAPSULE, LIQUID FILLED ORAL 2 TIMES DAILY PRN
Status: DISCONTINUED | OUTPATIENT
Start: 2019-08-27 | End: 2019-08-29 | Stop reason: HOSPADM

## 2019-08-27 RX ORDER — LEVOTHYROXINE SODIUM 0.15 MG/1
300 TABLET ORAL DAILY
Status: DISCONTINUED | OUTPATIENT
Start: 2019-08-27 | End: 2019-08-29 | Stop reason: HOSPADM

## 2019-08-27 RX ADMIN — INSULIN LISPRO 3 UNITS: 100 INJECTION, SOLUTION INTRAVENOUS; SUBCUTANEOUS at 13:46

## 2019-08-27 RX ADMIN — SPIRONOLACTONE 50 MG: 25 TABLET ORAL at 09:42

## 2019-08-27 RX ADMIN — CEFTRIAXONE SODIUM 1 G: 1 INJECTION, POWDER, FOR SOLUTION INTRAMUSCULAR; INTRAVENOUS at 19:46

## 2019-08-27 RX ADMIN — SUCRALFATE 1 G: 1 TABLET ORAL at 06:46

## 2019-08-27 RX ADMIN — LEVOTHYROXINE SODIUM 300 MCG: 150 TABLET ORAL at 06:46

## 2019-08-27 RX ADMIN — CAPSAICIN: 0.25 CREAM TOPICAL at 19:46

## 2019-08-27 RX ADMIN — SUCRALFATE 1 G: 1 TABLET ORAL at 19:10

## 2019-08-27 RX ADMIN — INSULIN LISPRO 2 UNITS: 100 INJECTION, SOLUTION INTRAVENOUS; SUBCUTANEOUS at 19:08

## 2019-08-27 RX ADMIN — FUROSEMIDE 40 MG: 10 INJECTION, SOLUTION INTRAMUSCULAR; INTRAVENOUS at 06:46

## 2019-08-27 RX ADMIN — SUCRALFATE 1 G: 1 TABLET ORAL at 23:38

## 2019-08-27 RX ADMIN — INSULIN LISPRO 1 UNITS: 100 INJECTION, SOLUTION INTRAVENOUS; SUBCUTANEOUS at 09:23

## 2019-08-27 RX ADMIN — SODIUM CHLORIDE, PRESERVATIVE FREE 10 ML: 5 INJECTION INTRAVENOUS at 09:40

## 2019-08-27 RX ADMIN — SODIUM CHLORIDE, PRESERVATIVE FREE 10 ML: 5 INJECTION INTRAVENOUS at 19:47

## 2019-08-27 RX ADMIN — INSULIN GLARGINE 5 UNITS: 100 INJECTION, SOLUTION SUBCUTANEOUS at 09:23

## 2019-08-27 RX ADMIN — SIMVASTATIN 40 MG: 40 TABLET, FILM COATED ORAL at 19:46

## 2019-08-27 RX ADMIN — FUROSEMIDE 40 MG: 10 INJECTION, SOLUTION INTRAMUSCULAR; INTRAVENOUS at 19:46

## 2019-08-27 RX ADMIN — ENOXAPARIN SODIUM 40 MG: 40 INJECTION SUBCUTANEOUS at 09:47

## 2019-08-27 RX ADMIN — INSULIN LISPRO 3 UNITS: 100 INJECTION, SOLUTION INTRAVENOUS; SUBCUTANEOUS at 23:38

## 2019-08-27 RX ADMIN — SERTRALINE 50 MG: 50 TABLET, FILM COATED ORAL at 09:42

## 2019-08-27 RX ADMIN — CAPSAICIN: 0.25 CREAM TOPICAL at 09:38

## 2019-08-27 RX ADMIN — SUCRALFATE 1 G: 1 TABLET ORAL at 13:38

## 2019-08-27 ASSESSMENT — PAIN SCALES - GENERAL
PAINLEVEL_OUTOF10: 0

## 2019-08-27 NOTE — PROGRESS NOTES
[address] [address]  INPATIENT PHYSICAL THERAPY  DAILY NOTE  [facility] 5K - 5K-18/018-A    Time In: 5527  Time Out: 1112  Timed Code Treatment Minutes: 16 Minutes  Minutes: 16          Date: 2019  Patient Name: Bettina Medina,  Gender:  female        MRN: [id number]  : 1960  (62 y.o.)     Referring Practitioner: Ronald Ellington MD  Diagnosis: ascites        Prior Level of Function:  Lives With: Family  Type of Home: House  Home Layout: One level  Home Access: Stairs to enter without rails  Entrance Stairs - Number of Steps: 1  Home Equipment: Rolling walker, Wheelchair-manual   Bathroom Shower/Tub: Tub/Shower unit(Pt reporting she does sponge bathing only)  Bathroom Toilet: Standard  Bathroom Equipment: Commode  Bathroom Accessibility: Accessible    ADL Assistance: Independent  Ambulation Assistance: Independent  Transfer Assistance: Independent  Additional Comments: Pt stating she was indep with all ADL tasks. Restrictions/Precautions:  Restrictions/Precautions: Up as Tolerated, Fall Risk    SUBJECTIVE: RN approved session. Pt in recliner upon arrival and agrees to therapy. Pt requesting to return to bed at end of session. PAIN: 0/10: denies    OBJECTIVE:  Bed Mobility:  Sit to Supine: Stand By Assistance   Scooting: Stand By Assistance    Transfers:  Sit to Stand: Stand By Assistance  Stand to Sit:Stand By Assistance  Impulsive    Ambulation:  Stand By Assistance, [employer] Assistance  Distance: 85'x1  Surface: Level Tile  Device:Rolling Walker  Gait Deviations: Forward Flexed Posture, Slow Stefania, Decreased Step Length Bilaterally, Decreased Gait Speed and Decreased Heel Strike Bilaterally      Exercise:  Patient was guided in 1 set(s) 10 reps of exercise to both lower extremities. Long arc quads, Seated hip flexion, Seated hamstring curls, Seated heel/toe raises and Seated isometric hip adduction.   Exercises were completed for increased independence with functional

## 2019-08-27 NOTE — PROGRESS NOTES
[facility] 5K  Occupational Therapy  Daily Note  Time:   Time In:   Time Out:   Timed Code Treatment Minutes: 28 Minutes  Minutes: 28          Date: 2019  Patient Name: Vito Mohr,   Gender: female      Room: -18/018-A  MRN: [id number]  : 1960  (60 y.o.)  Referring Practitioner: Dr. Julien Blakely  Diagnosis: ascites   Additional Pertinent Hx: 61 yo female was transferred to [facility] for GI services given ascites due to liver cirrhosis. Pt was seen here 2 weeks ago by GI. US of liver was performed showing cirrhosis. Pt had paracentesis performed removing 5L of fluid consistent with transudate and SAAG consistent with portal hypertension. Pt was discharged with diuretics Aldactone and Lasix but states that she has not been taking them for the past week due to nausea/vomiting when she would attempt to take her pills. Pt did not follow up with her PCP. The pt notes that she feels her belly was \"too full\" and that she could not keep her medicine down. She reports associated SOB. She reports frequent, loose black stools since being discharged 2 weeks ago    Restrictions/Precautions:  Restrictions/Precautions: Up as Tolerated, Fall Risk    SUBJECTIVE: Patient supine in bed    PAIN: denies    ADL:   Grooming: Stand By Assistance. seated EOB with washing hands and face, Hair care   Bathing: Min A with washing buttocks  Upper Extremity Dressing: Minimal Assistance. hospital gow    BALANCE:  Sitting Balance:  Stand By Assistance  Standing Balance: Contact Guard Assistance    BED MOBILITY:  Supine to Sit: Stand By Assistance        TRANSFERS:  Sit to Stand:  Stand By Assistance. Stand to Sit: Stand By Assistance. To/from bed to recliner    FUNCTIONAL MOBILITY:  Assistive Device: [address] Level:  Contact Guard Assistance.    Around bed to recliner slow pace,     ADDITIONAL ACTIVITIES:  Patient reports having wheel chair and commode at home    ASSESSMENT:  Activity Tolerance:  Patient tolerance of  treatment: good. Discharge Recommendations: Home with assist PRN  Equipment Recommendations: Equipment Needed: No  Other: possibly a RW  Plan: Times per week: 3-5x    Patient Education  Patient Education: Plan of Care, ADL's, Equipment Education     Goals  Short term goals  Time Frame for Short term goals: 2 weeks   Short term goal 1: Pt to complete sponge bathing tasks with min A   Short term goal 2: Pt to demo dynamic standing > 2 min with SBA and no no UE support to complete ADL tasks   Short term goal 3: Pt to navigate throughout room using RW with CGA to obtain needed ADL items  Long term goals  Time Frame for Long term goals : not est d/t ELOS    Following session, patient left in safe position with all fall risk precautions in place.

## 2019-08-27 NOTE — PROGRESS NOTES
Gastroenterology Progress Note:     Patient Name:  Hanny Singh   MRN: [id number]  [id number]  YOB: 1960  Admit Date: 8/21/2019  9:32 PM  Primary Care Physician: Anam Jarvis PA-C   5K-18/018-A     Patient seen and examined. 24 hours events and chart reviewed. Subjective: Patient sitting up in the chair, working with therapy. States she has some abdominal discomfort. She had some nausea this morning, denies vomiting. Review of Systems  Neuro- negative for headache, dizziness, altered level of consciousness  Cardiovascular- negative for CP, SOB, peripheral edema, orthopnea  GI- negative for nausea, vomiting, dysphagia, odynophagia +abd pain +abd distention  - negative for dysuria, frequency, hematuria  Skin- negative for rash, or wounds  Psychiatric- negative for depression or anxiety  Hematologic- negative for spontaneous bleeding  Endocrine- negative for polyuria or polydipsia    Objective:  BP (!) 109/55   Pulse 73   Temp 97.6 °F (36.4 °C) (Oral)   Resp 18   Ht 5' 4\" (1.626 m)   Wt 268 lb 9.6 oz (121.8 kg)   SpO2 96%   BMI 46.11 kg/m²     Physical Exam:    General:  Nourished in no distress  HEENT: Atraumatic, normocephalic. Moist oral mucous membranes. Neck: Supple without adenopathy, JVD, thyromegaly or masses. No JVD present. Trachea midline. CV: Heart RRR, no murmurs, rubs, gallops. Resp: Even, easy without cough or accessory use. Lungs clear to ascultation bilaterally. Abd: Round, soft. No hepatosplenomegaly or mass present. Active bowel sounds heard. Tender to upper abdomen upon palpation. Upper abdomen firm, mid to lower abdomen soft. Ext:  Without cyanosis, clubbing, edema. Skin: Pink, warm, dry  Neuro:  Alert, oriented x3 with no obvious deficits.        Rectal: deferred    Labs:   CBC:   Lab Results   Component Value Date    WBC 4.1 08/27/2019    HGB 11.3 08/27/2019    HCT 35.2 08/27/2019    .9 08/27/2019     08/27/2019     BMP:   Lab Results

## 2019-08-27 NOTE — PROGRESS NOTES
Hospitalist Progress Note    Patient:  Lilly Magana      Unit/Bed:5K-18/018-A    YOB: 1960    MRN: [id number]       Acct: [de-identified]     PCP: Caroline Walton PA-C    Date of Admission: 8/21/2019      Assessment/Plan:  Active Hospital Problems    Diagnosis Date Noted    HTN (hypertension) [I10] 08/22/2019    LUQ abdominal pain [R10.12] 08/22/2019    Ascites [R18.8] 18/97/7622    Alcoholic cirrhosis of liver with ascites ([address] [K70.31] 05/16/2019    Type 2 diabetes mellitus with stage 3 chronic kidney disease, without long-term current use of insulin ([address] [E11.22, N18.3] 05/16/2019    Hypothyroid [E03.9] 05/16/2019       Decompensated Alcoholic Cirrhosis with ascites - due to non-compliance. Last paracentesis was 8/2/19 during hospitalization but has not been taking diuretics (nausea). Has had ~10L of ascites taken off this admission.   - monitor liver labs  - therapeutic/diagnostic para 8/22 with 5L removed. 5.75 removed on 8/24   - albumin per GI   - Cell count not supportive of SBP   - follow gram stain/culture, NGTD   - Pursuing additional paracentesis today. Will ask GI about consideration for TIPS?  - monitor creatinine  - low Na diet and fluid restrict. - added back on lasix. RAMBO vs CKD 3 - near baseline, will continue to monitor. Likely due to abdominal distension/renal congestion. Avoid nephrotoxins. Give albumin post para to prevent HRS. Restart diuretics. Stop IVF. Improving    Macrocytic Anemia - vitamin B12 and folate wnl 8/2/19. Likely related to liver disease and etoh history. At baseline. Assymetric LE edema - will check LE doppler, No DVT    Pancytopenia - seems slightly improved from recent hospital stay. A result of HSM and cirrhosis. Will monitor. Depression - continue sertraline    DM2 - hold glimeperide, continue on SSI with accuchecks for now. Lantus 10 u qhs. Has neuropathic pain.  Will try capsaicin cream. Gabapentin induced SI in the MODERATE 08/23/2019    GLUCOSEU NEGATIVE 08/23/2019       Radiology:  US GUIDED PARACENTESIS   Final Result      Successful ultrasound-guided paracentesis. 5.75 L of fluid drained. **This report has been created using voice recognition software. It may contain minor errors which are inherent in voice recognition technology. **            Final report electronically signed by Dr Kenyatta Ware on 8/25/2019 1:31 PM      VL DUP LOWER EXTREMITY VENOUS BILATERAL   Final Result   No evidence of deep venous thrombosis in either lower extremity. **This report has been created using voice recognition software. It may contain minor errors which are inherent in voice recognition technology. **             Final report electronically signed by Dr. Claus Walker on 8/23/2019 7:29 AM      US [address]   Final Result   1. Uncomplicated diagnostic and therapeutic paracentesis. **This report has been created using voice recognition software. It may contain minor errors which are inherent in voice recognition technology. **      Final report electronically signed by Dr. Mary Encarnacion on 8/22/2019 2:01 PM      [employer]    (Results Pending)       DVT prophylaxis: [x] Lovenox                                  [] SCDs                                 [] SQ Heparin                                 [] Encourage ambulation           [] Already on Anticoagulation     Code Status: Full Code    PT/OT Eval Status:     Diet:   Dietary Nutrition Supplements: Diabetic Oral Supplement  DIET LOW SODIUM 2 GM; Carb Control: 4 carb choices (60 gms)/meal; 1800 ml    Fluids: stopped    Tele:   [] yes             [] no      Electronically signed by Davonte Landin MD on 8/27/2019 at 2:51 PM

## 2019-08-27 NOTE — CONSULTS
Stopped at 08/26/19 2115    nicotine (NICODERM CQ) 21 MG/24HR 1 patch  1 patch Transdermal Daily Chelsie Bee PA-C        sertraline (ZOLOFT) tablet 50 mg  50 mg Oral Daily Chelsie Bee PA-C   50 mg at 08/27/19 0125    simvastatin (ZOCOR) tablet 40 mg  40 mg Oral Nightly Aminah Cortes PA-C   40 mg at 08/26/19 2044    glucose (GLUTOSE) 40 % oral gel 15 g  15 g Oral PRN Chelsie Bee PA-C        dextrose 50 % IV solution  12.5 g Intravenous PRN Chelsie Bee PA-C        glucagon (rDNA) injection 1 mg  1 mg Intramuscular PRN Chelsie Bee PA-C        dextrose 5 % solution  100 mL/hr Intravenous PRN Chelsie Bee PA-C        insulin lispro (HUMALOG) injection vial 0-6 Units  0-6 Units Subcutaneous TID WC Chelsie Bee PA-C   3 Units at 08/27/19 1346    insulin lispro (HUMALOG) injection vial 0-3 Units  0-3 Units Subcutaneous Nightly Aminah Cortes PA-C   1 Units at 08/26/19 2044    insulin glargine (LANTUS) injection vial 5 Units  5 Units Subcutaneous [facility] OPHELIA Jc DO   5 Units at 08/27/19 2171    sodium chloride flush 0.9 % injection 10 mL  10 mL Intravenous 2 times per day Aminah Cortes PA-C   10 mL at 08/27/19 0940    sodium chloride flush 0.9 % injection 10 mL  10 mL Intravenous PRN Chelsie Bee PA-C        ondansetron (ZOFRAN) injection 4 mg  4 mg Intravenous Q6H PRN Chelsie Bee PA-C        magnesium hydroxide (MILK OF MAGNESIA) 400 MG/5ML suspension 30 mL  30 mL Oral Daily PRN Chelsie Bee PA-C        magnesium sulfate 1 g in dextrose 5 % 100 mL IVPB  1 g Intravenous PRN Chelsie Bee PA-C        potassium chloride 10 mEq/100 mL IVPB (Peripheral Line)  10 mEq Intravenous PRN Chelsie Bee PA-C        enoxaparin (LOVENOX) injection 40 mg  40 mg Subcutaneous Daily Chelsie Bee PA-C   40 mg at 08/27/19 0947     Home Meds:   Prior to Admission medications    Medication Sig Start Date End Date Taking?  Authorizing Provider   furosemide (LASIX) 40 MG tablet

## 2019-08-28 ENCOUNTER — APPOINTMENT (OUTPATIENT)
Dept: ULTRASOUND IMAGING | Age: 59
DRG: 280 | End: 2019-08-28
Attending: INTERNAL MEDICINE
Payer: MEDICAID

## 2019-08-28 LAB
ALBUMIN SERPL-MCNC: 3.1 G/DL (ref 3.5–5.1)
ALP BLD-CCNC: 100 U/L (ref 38–126)
ALT SERPL-CCNC: 14 U/L (ref 11–66)
ANION GAP SERPL CALCULATED.3IONS-SCNC: 11 MEQ/L (ref 8–16)
AST SERPL-CCNC: 18 U/L (ref 5–40)
BILIRUB SERPL-MCNC: 0.4 MG/DL (ref 0.3–1.2)
BODY FLUID RBC: < 2000 /CUMM
BUN BLDV-MCNC: 17 MG/DL (ref 7–22)
CALCIUM SERPL-MCNC: 8.7 MG/DL (ref 8.5–10.5)
CHARACTER, BODY FLUID: CLEAR
CHLORIDE BLD-SCNC: 106 MEQ/L (ref 98–111)
CO2: 24 MEQ/L (ref 23–33)
COLOR: YELLOW
CREAT SERPL-MCNC: 1.6 MG/DL (ref 0.4–1.2)
ERYTHROCYTE [DISTWIDTH] IN BLOOD BY AUTOMATED COUNT: 13.8 % (ref 11.5–14.5)
ERYTHROCYTE [DISTWIDTH] IN BLOOD BY AUTOMATED COUNT: 51.1 FL (ref 35–45)
GFR SERPL CREATININE-BSD FRML MDRD: 33 ML/MIN/1.73M2
GLUCOSE BLD-MCNC: 196 MG/DL (ref 70–108)
GLUCOSE BLD-MCNC: 215 MG/DL (ref 70–108)
GLUCOSE BLD-MCNC: 229 MG/DL (ref 70–108)
GLUCOSE BLD-MCNC: 238 MG/DL (ref 70–108)
HCT VFR BLD CALC: 33.6 % (ref 37–47)
HEMOGLOBIN: 10.7 GM/DL (ref 12–16)
INR BLD: 1.14 (ref 0.85–1.13)
MCH RBC QN AUTO: 32.2 PG (ref 26–33)
MCHC RBC AUTO-ENTMCNC: 31.8 GM/DL (ref 32.2–35.5)
MCV RBC AUTO: 101.2 FL (ref 81–99)
MESOTHELIAL CELLS BODY FLUID: NORMAL
MONONUCLEAR CELLS BODY FLUID: 93 %
PATHOLOGIST REVIEW: NORMAL
PLATELET # BLD: 89 THOU/MM3 (ref 130–400)
PMV BLD AUTO: 10 FL (ref 9.4–12.4)
POLYMORPHONUCLEAR CELLS BODY FLUID: 7 %
POTASSIUM SERPL-SCNC: 4 MEQ/L (ref 3.5–5.2)
RBC # BLD: 3.32 MILL/MM3 (ref 4.2–5.4)
SODIUM BLD-SCNC: 141 MEQ/L (ref 135–145)
SPECIMEN: NORMAL
TOTAL NUCLEATED CELLS BODY FLUID: 101 /CUMM (ref 0–500)
TOTAL PROTEIN: 6 G/DL (ref 6.1–8)
TOTAL VOLUME RECEIVED BODY FLUID: 80 ML
WBC # BLD: 3.8 THOU/MM3 (ref 4.8–10.8)

## 2019-08-28 PROCEDURE — 2580000003 HC RX 258: Performed by: PHYSICIAN ASSISTANT

## 2019-08-28 PROCEDURE — 6370000000 HC RX 637 (ALT 250 FOR IP): Performed by: INTERNAL MEDICINE

## 2019-08-28 PROCEDURE — P9047 ALBUMIN (HUMAN), 25%, 50ML: HCPCS | Performed by: NURSE PRACTITIONER

## 2019-08-28 PROCEDURE — 6360000002 HC RX W HCPCS: Performed by: INTERNAL MEDICINE

## 2019-08-28 PROCEDURE — 87070 CULTURE OTHR SPECIMN AEROBIC: CPT

## 2019-08-28 PROCEDURE — 6370000000 HC RX 637 (ALT 250 FOR IP): Performed by: FAMILY MEDICINE

## 2019-08-28 PROCEDURE — 82948 REAGENT STRIP/BLOOD GLUCOSE: CPT

## 2019-08-28 PROCEDURE — 87205 SMEAR GRAM STAIN: CPT

## 2019-08-28 PROCEDURE — 6360000002 HC RX W HCPCS: Performed by: NURSE PRACTITIONER

## 2019-08-28 PROCEDURE — 97116 GAIT TRAINING THERAPY: CPT

## 2019-08-28 PROCEDURE — 1200000000 HC SEMI PRIVATE

## 2019-08-28 PROCEDURE — 0W9G3ZZ DRAINAGE OF PERITONEAL CAVITY, PERCUTANEOUS APPROACH: ICD-10-PCS | Performed by: RADIOLOGY

## 2019-08-28 PROCEDURE — 89050 BODY FLUID CELL COUNT: CPT

## 2019-08-28 PROCEDURE — 49083 ABD PARACENTESIS W/IMAGING: CPT

## 2019-08-28 PROCEDURE — 99232 SBSQ HOSP IP/OBS MODERATE 35: CPT | Performed by: FAMILY MEDICINE

## 2019-08-28 PROCEDURE — 6370000000 HC RX 637 (ALT 250 FOR IP): Performed by: PHYSICIAN ASSISTANT

## 2019-08-28 PROCEDURE — 36415 COLL VENOUS BLD VENIPUNCTURE: CPT

## 2019-08-28 PROCEDURE — 80053 COMPREHEN METABOLIC PANEL: CPT

## 2019-08-28 PROCEDURE — 87075 CULTR BACTERIA EXCEPT BLOOD: CPT

## 2019-08-28 PROCEDURE — 85027 COMPLETE CBC AUTOMATED: CPT

## 2019-08-28 PROCEDURE — 2580000003 HC RX 258: Performed by: INTERNAL MEDICINE

## 2019-08-28 PROCEDURE — 85610 PROTHROMBIN TIME: CPT

## 2019-08-28 RX ORDER — ACETAMINOPHEN 325 MG/1
650 TABLET ORAL EVERY 6 HOURS PRN
Status: DISCONTINUED | OUTPATIENT
Start: 2019-08-28 | End: 2019-08-29 | Stop reason: HOSPADM

## 2019-08-28 RX ORDER — FUROSEMIDE 40 MG/1
40 TABLET ORAL 2 TIMES DAILY
Status: DISCONTINUED | OUTPATIENT
Start: 2019-08-28 | End: 2019-08-29 | Stop reason: HOSPADM

## 2019-08-28 RX ORDER — SUCRALFATE 1 G/1
1 TABLET ORAL
Qty: 120 TABLET | Refills: 0 | Status: ON HOLD | OUTPATIENT
Start: 2019-08-28 | End: 2019-10-22 | Stop reason: SINTOL

## 2019-08-28 RX ADMIN — SUCRALFATE 1 G: 1 TABLET ORAL at 18:25

## 2019-08-28 RX ADMIN — INSULIN GLARGINE 5 UNITS: 100 INJECTION, SOLUTION SUBCUTANEOUS at 08:02

## 2019-08-28 RX ADMIN — FUROSEMIDE 40 MG: 10 INJECTION, SOLUTION INTRAMUSCULAR; INTRAVENOUS at 05:34

## 2019-08-28 RX ADMIN — INSULIN LISPRO 1 UNITS: 100 INJECTION, SOLUTION INTRAVENOUS; SUBCUTANEOUS at 19:47

## 2019-08-28 RX ADMIN — SIMVASTATIN 40 MG: 40 TABLET, FILM COATED ORAL at 19:45

## 2019-08-28 RX ADMIN — ACETAMINOPHEN 650 MG: 325 TABLET ORAL at 06:50

## 2019-08-28 RX ADMIN — SERTRALINE 50 MG: 50 TABLET, FILM COATED ORAL at 08:01

## 2019-08-28 RX ADMIN — ALBUMIN (HUMAN) 25 G: 0.25 INJECTION, SOLUTION INTRAVENOUS at 13:31

## 2019-08-28 RX ADMIN — CEFTRIAXONE SODIUM 1 G: 1 INJECTION, POWDER, FOR SOLUTION INTRAMUSCULAR; INTRAVENOUS at 19:15

## 2019-08-28 RX ADMIN — INSULIN LISPRO 2 UNITS: 100 INJECTION, SOLUTION INTRAVENOUS; SUBCUTANEOUS at 18:24

## 2019-08-28 RX ADMIN — FUROSEMIDE 40 MG: 40 TABLET ORAL at 18:27

## 2019-08-28 RX ADMIN — SUCRALFATE 1 G: 1 TABLET ORAL at 05:34

## 2019-08-28 RX ADMIN — SPIRONOLACTONE 50 MG: 25 TABLET ORAL at 08:01

## 2019-08-28 RX ADMIN — SUCRALFATE 1 G: 1 TABLET ORAL at 19:45

## 2019-08-28 RX ADMIN — SUCRALFATE 1 G: 1 TABLET ORAL at 13:32

## 2019-08-28 RX ADMIN — CAPSAICIN: 0.25 CREAM TOPICAL at 08:02

## 2019-08-28 RX ADMIN — LEVOTHYROXINE SODIUM 300 MCG: 150 TABLET ORAL at 05:34

## 2019-08-28 RX ADMIN — SODIUM CHLORIDE, PRESERVATIVE FREE 10 ML: 5 INJECTION INTRAVENOUS at 08:02

## 2019-08-28 RX ADMIN — CAPSAICIN: 0.25 CREAM TOPICAL at 19:45

## 2019-08-28 ASSESSMENT — PAIN SCALES - GENERAL
PAINLEVEL_OUTOF10: 0
PAINLEVEL_OUTOF10: 4

## 2019-08-28 NOTE — PLAN OF CARE
Problem: Falls - Risk of:  Goal: Absence of physical injury  Description  Absence of physical injury  Outcome: Met This Shift     Problem: Respiratory  Goal: O2 Sat > 90%  Outcome: Met This Shift   Sao2 is in the mid to high 90's. Problem: Pain:  Goal: Pain level will decrease  Description  Pain level will decrease  Outcome: Ongoing   Is only reporting some stomach discomfort which she is rating a 2. Problem: Pain:  Goal: Control of acute pain  Description  Control of acute pain  Outcome: Ongoing     Problem: Falls - Risk of:  Goal: Will remain free from falls  Description  Will remain free from falls  Outcome: Ongoing   Bed and chair alarm on. She is steady on her feet for transfers and uses her call light. Problem: GI  Goal: No bowel complications  Outcome: Ongoing   Stool softener changed to PRN as she is having soft formed stools. Problem: Discharge Planning:  Goal: Participates in care planning  Description  Participates in care planning  Outcome: Ongoing     Problem: Nutrition  Goal: Optimal nutrition therapy  Outcome: Ongoing   Appetite is good and I attempted to teach her about carbs and protein and she continues to want to order mostly carbohydrates at each meal.  Problem: DISCHARGE BARRIERS  Goal: Patient's continuum of care needs are met  Outcome: Ongoing     Problem: Serum Glucose Level - Abnormal:  Goal: Ability to maintain appropriate glucose levels has stabilized  Description  Ability to maintain appropriate glucose levels has stabilized  Outcome: Ongoing        She requires insulin coverage with each meal but her blood sugars or in the 200's consistently. Care plan reviewed with patient and family. Patient and family verbalize understanding of the plan of care and contribute to goal setting.

## 2019-08-28 NOTE — PROGRESS NOTES
Family, Social and Allergy Histories:  I have reviewed the patient's medical history in detail and updated the computerized patient record. has a past medical history of Arthritis, Chronic kidney disease, COPD (chronic obstructive pulmonary disease) ([facility] [address] Diabetes mellitus ([address] Heart abnormality, Hyperlipidemia, Hypertension, and Thyroid disease. has a past surgical history that includes Hysterectomy;  section; Lumbar disc surgery (2016); eye surgery; and Upper gastrointestinal endoscopy (Left, 2019). reports that she has been smoking cigarettes. She has a 10.00 pack-year smoking history. She has never used smokeless tobacco. She reports that she drank alcohol. She reports that she has current or past drug history. family history includes Cancer in her sister; Diabetes in her father and mother; Hypertension in her brother, father, mother, and sister.   Allergies   Allergen Reactions    Gabapentin Other (See Comments)     Tried to commit suicide    Hydrocodone-Acetaminophen     Naproxen     Pentazocine     Rofecoxib     Sulfamethoxazole-Trimethoprim      Current Facility-Administered Medications   Medication Dose Route Frequency Provider Last Rate Last Dose    acetaminophen (TYLENOL) tablet 650 mg  650 mg Oral Q6H PRN Arabella Gaviria PA-C   650 mg at 19 0650    furosemide (LASIX) tablet 40 mg  40 mg Oral BID Yoly Mancia MD        levothyroxine (SYNTHROID) tablet 300 mcg  300 mcg Oral Daily Beni Lowry MD   300 mcg at 19 0534    docusate sodium (COLACE) capsule 100 mg  100 mg Oral BID PRN Clive Councilman, MD        capsaicin (ZOSTRIX) 0.025 % cream   Topical BID Clive Councilman, MD        sucralfate (CARAFATE) tablet 1 g  1 g Oral 4x Daily AC & HS Brendan Escalante MD   1 g at 19 1332    spironolactone (ALDACTONE) tablet 50 mg  50 mg Oral Daily Clive Councilman, MD   50 mg at 19 0801    cefTRIAXone (ROCEPHIN) 1 g IVPB in 50 mL D5W minibag  1 g Intravenous Q24H Iram Arzola MD   Stopped at 08/27/19 2016    nicotine (NICODERM CQ) 21 MG/24HR 1 patch  1 patch Transdermal Daily Chelsie Bee PA-C        sertraline (ZOLOFT) tablet 50 mg  50 mg Oral Daily Chelsie Bee PA-C   50 mg at 08/28/19 0801    simvastatin (ZOCOR) tablet 40 mg  40 mg Oral Nightly Jose C Osei PA-C   40 mg at 08/27/19 1946    glucose (GLUTOSE) 40 % oral gel 15 g  15 g Oral PRN Chelsie Bee PA-C        dextrose 50 % IV solution  12.5 g Intravenous PRN Chelsie Bee PA-C        glucagon (rDNA) injection 1 mg  1 mg Intramuscular PRN Chelsie Bee PA-C        dextrose 5 % solution  100 mL/hr Intravenous PRN Chelsie Bee PA-C        insulin lispro (HUMALOG) injection vial 0-6 Units  0-6 Units Subcutaneous TID WC Chelsie Bee PA-C   2 Units at 08/27/19 1908    insulin lispro (HUMALOG) injection vial 0-3 Units  0-3 Units Subcutaneous Nightly Jose C Osei PA-C   3 Units at 08/27/19 2338    insulin glargine (LANTUS) injection vial 5 Units  5 Units Subcutaneous [facility]   5 Units at 08/28/19 0802    sodium chloride flush 0.9 % injection 10 mL  10 mL Intravenous 2 times per day Jose C Osei PA-C   10 mL at 08/28/19 0802    sodium chloride flush 0.9 % injection 10 mL  10 mL Intravenous PRN Chelsie Bee PA-C        ondansetron (ZOFRAN) injection 4 mg  4 mg Intravenous Q6H PRN Chelsie Bee PA-C        magnesium hydroxide (MILK OF MAGNESIA) 400 MG/5ML suspension 30 mL  30 mL Oral Daily PRN Chelsie Bee PA-C        magnesium sulfate 1 g in dextrose 5 % 100 mL IVPB  1 g Intravenous PRN Chelsie Bee PA-C        potassium chloride 10 mEq/100 mL IVPB (Peripheral Line)  10 mEq Intravenous PRN Chelsie Bee PA-C        enoxaparin (LOVENOX) injection 40 mg  40 mg Subcutaneous Daily Chelsie Bee PA-C   Stopped at 08/28/19 0741     Home Meds:   Prior to Admission medications    Medication Sig Start Date End Date Taking?  Authorizing

## 2019-08-28 NOTE — PROGRESS NOTES
Physical Therapy   [facility]  INPATIENT PHYSICAL THERAPY  DAILY NOTE  [facility] ONC MED 5K - 5K-18/018-A    Time In: 9270  Time Out: 1152  Timed Code Treatment Minutes: 15 Minutes  Minutes: 15          Date: 2019  Patient Name: Marco A Carmona,  Gender:  female        MRN: [id number]  : 1960  (62 y.o.)     Referring Practitioner: Andria Pantoja MD  Diagnosis: ascites        Prior Level of Function:  Lives With: Family  Type of Home: House  Home Layout: One level  Home Access: Stairs to enter without rails  Entrance Stairs - Number of Steps: 1  Home Equipment: Rolling walker, Wheelchair-manual   Bathroom Shower/Tub: Tub/Shower unit(Pt reporting she does sponge bathing only)  Bathroom Toilet: Standard  Bathroom Equipment: Commode  Bathroom Accessibility: Accessible    ADL Assistance: Independent  Ambulation Assistance: Independent  Transfer Assistance: Independent  Additional Comments: Pt stating she was indep with all ADL tasks. Restrictions/Precautions:  Restrictions/Precautions: Up as Tolerated, Fall Risk    SUBJECTIVE: Rn approved session, second attempt, pt requiring max encouragement to participate. Pt agreeable to amb in hallway. Decreased tx time d/t pt leaving room for procedure. PAIN: not mentioned, states feeling a little better since this AM    OBJECTIVE:  Bed Mobility:  Supine to Sit: Modified Independent  Scooting: Modified Independent    Transfers:  Sit to Stand: Stand By Assistance  Stand to Sit:Stand By Assistance    Ambulation:  Stand By Assistance  Distance: approx 230ft x1  Surface: Level Tile  Device:Rolling Walker  Gait Deviations: Forward Flexed Posture, Slow Stefania, Decreased Step Length Bilaterally, Decreased Gait Speed and Decreased Heel Strike Bilaterally    Balance:  Dynamic Sitting Balance: Supervision    Exercise:  Patient was guided in 1 set(s) 12 reps of exercise to both lower extremities. Long arc quads.   Exercises were completed for increased

## 2019-08-28 NOTE — PROGRESS NOTES
endocrinology assist - , free T4 0.47 on admission --> cont high dose synthroid per endocrinology - free T4 improving 8/26 0.72  10. Medium-sized esophageal varices w/o Garett sign -- per EGD by Dr. Gabby Vasquez 8/23/19 -- ?need PPI   11. Severe portal gastropathy -- per EGD 8/23/19 -- cont carafate per GI -- ?need PPI -- ??need propranolol  12. Coagulopathy -- due to cirrhosis - minimal elevated INR 1.10's- 1.20's   13. Type 2 DM -- A1C 8/21/19 = 9.2 -- was 10 in 6/18/19 --> endocrinology following thyroid - on lantus 5 units daily, SSI -- holding home amaryl - ?resume at d/c.  14. Essential HTN -- no current meds other than diuretics for #1 -- borderline BP - f/u outpt - monitor and adjust prn  15. HLD -- last lipids 6/2019 total 157, HLD 39, LDL 98, trig 101 -- cont statin  16. Remote hx ETOH abuse  17. Depression -- cont zoloft  18. Morbid Obesity Body mass index is 46.11 kg/m². 23. Noncompliance -- educated -- plan for [place] at d/c for assist    Dispo  -- 8/28 --> apprec GI assist - pt s/p another tap this am- some n/v after - cont monitor sx and monitor po tolerance. Cont monitor lytes, renal fxn, CBC, BP, GI sx. Likely home tomorrow with [place]. Chief Complaint: abd pain    Hospital Course: Hanny Singh is 62 y.o. female with cirrhosis, HTN, HLD, hypothyroidism, DM2, COPD, CKD presenting with abdominal pain. ADmitted with decompensated cirrhosis. GI consulted - s/p paracentesis 8/22 5L, 8/25/19 5.75L, and again 8/28/19 5.38 L. Started on rocephin 8/23 per GI for abd pain and ? SBP - cx(-). -- endocrinology consulted for severe hypothyroidism - synthroid doses adjusted per Dr. Sweet Husbands. Subjective:   -- 8/28--> pt c/o n/v after returning from paracentesis - abd overall feels better but just got sick when got back - threw up frothy green bile. Just given zofran and feeling little better. was feeling fine prior to tap. Denies cp, sob. Denies f/c. On RA. Afebrile.   Ambulating without gallops. Abdomen: Soft, mild diffuse TTP, non-distended with normal bowel sounds. No rebound or guarding  Musculoskeletal: 1+ BLE edema. Full range of motion without deformity. No calf tenderness palpation  Skin: Skin color, texture, turgor normal.  No rashes or lesions. Neurologic:  Neurovascularly intact without any focal sensory/motor deficits. Cranial nerves: II-XII intact, grossly non-focal.  Psychiatric: Alert and oriented, thought content appropriate, normal insight  Capillary Refill: Brisk,< 3 seconds   Peripheral Pulses: +2 palpable, equal bilaterally       Labs:   Recent Labs     08/26/19  0650 08/27/19  0731 08/28/19  0644   WBC 3.6* 4.1* 3.8*   HGB 10.7* 11.3* 10.7*   HCT 33.6* 35.2* 33.6*   PLT 88* 102* 89*     Recent Labs     08/26/19  0650 08/27/19  0731 08/28/19  0644    140 141   K 4.5 4.1 4.0    106 106   CO2 22* 24 24   BUN 17 17 17   CREATININE 1.5* 1.6* 1.6*   CALCIUM 8.5 8.6 8.7     Recent Labs     08/26/19  0650 08/27/19  0731 08/28/19  0644   AST 23 20 18   ALT 15 14 14   BILITOT 0.4 0.4 0.4   ALKPHOS 106 105 100     Recent Labs     08/26/19  0650 08/27/19  0731 08/28/19  0644   INR 1.20* 1.16* 1.14*     No results for input(s): CKTOTAL, TROPONINI in the last 72 hours. Urinalysis:      Lab Results   Component Value Date    NITRU NEGATIVE 08/23/2019    WBCUA 0-2 08/23/2019    BACTERIA NONE 08/23/2019    RBCUA 3-5 08/23/2019    BLOODU MODERATE 08/23/2019    GLUCOSEU NEGATIVE 08/23/2019       Radiology:  US GUIDED PARACENTESIS   Final Result      Successful ultrasound-guided paracentesis. 5.75 L of fluid drained. **This report has been created using voice recognition software. It may contain minor errors which are inherent in voice recognition technology. **            Final report electronically signed by Dr Alexa Saldana on 8/25/2019 1:31 PM      VL DUP LOWER EXTREMITY VENOUS BILATERAL   Final Result   No evidence of deep venous thrombosis in either lower extremity. **This report has been created using voice recognition software. It may contain minor errors which are inherent in voice recognition technology. **             Final report electronically signed by Dr. Verner Fiddler on 8/23/2019 7:29 AM      [address]   Final Result   1. Uncomplicated diagnostic and therapeutic paracentesis. **This report has been created using voice recognition software. It may contain minor errors which are inherent in voice recognition technology. **      Final report electronically signed by Dr. John Rosenthal on 8/22/2019 2:01 PM      [address]    (Results Pending)       Diet: Dietary Nutrition Supplements: Diabetic Oral Supplement  DIET LOW SODIUM 2 GM; Carb Control: 4 carb choices (60 gms)/meal; 1800 ml    Alfaro: no    Microbiology:  Paracentesis fluid cx 8/22 (-) to date    Urine cx 8/23 = mixed growth    paracentesis 8/28 (p)    Tele:  no    DVT prophylaxis: [x] Lovenox                                 [] SCDs                                 [] SQ Heparin                                 [] Encourage ambulation           [] Already on Anticoagulation     Disposition:    [x] Home with [facility]       [] TCU       [] Rehab       [] Psych       [] SNF       [] [place]       [] Other-    Code Status: Full Code    PT/OT Eval Status:       Electronically signed by Armando Way MD on 8/28/2019 at 12:59 PM when pt evaluated -- final note filed late

## 2019-08-28 NOTE — PROGRESS NOTES
Formulation and discussion of sedation / procedure plans, risks, benefits, side effects and alternatives with patient and/or responsible adult completed.     Electronically signed by Mary Zapata MD on 8/28/2019 at 3:52 PM

## 2019-08-29 VITALS
SYSTOLIC BLOOD PRESSURE: 99 MMHG | DIASTOLIC BLOOD PRESSURE: 51 MMHG | TEMPERATURE: 98 F | HEART RATE: 76 BPM | BODY MASS INDEX: 45.85 KG/M2 | OXYGEN SATURATION: 95 % | HEIGHT: 64 IN | RESPIRATION RATE: 16 BRPM | WEIGHT: 268.6 LBS

## 2019-08-29 LAB
ALBUMIN SERPL-MCNC: 3.2 G/DL (ref 3.5–5.1)
ALP BLD-CCNC: 88 U/L (ref 38–126)
ALT SERPL-CCNC: 12 U/L (ref 11–66)
ANION GAP SERPL CALCULATED.3IONS-SCNC: 10 MEQ/L (ref 8–16)
AST SERPL-CCNC: 16 U/L (ref 5–40)
BILIRUB SERPL-MCNC: 0.3 MG/DL (ref 0.3–1.2)
BUN BLDV-MCNC: 18 MG/DL (ref 7–22)
CALCIUM SERPL-MCNC: 8.6 MG/DL (ref 8.5–10.5)
CHLORIDE BLD-SCNC: 104 MEQ/L (ref 98–111)
CO2: 26 MEQ/L (ref 23–33)
CREAT SERPL-MCNC: 1.7 MG/DL (ref 0.4–1.2)
ERYTHROCYTE [DISTWIDTH] IN BLOOD BY AUTOMATED COUNT: 13.8 % (ref 11.5–14.5)
ERYTHROCYTE [DISTWIDTH] IN BLOOD BY AUTOMATED COUNT: 51.2 FL (ref 35–45)
GFR SERPL CREATININE-BSD FRML MDRD: 31 ML/MIN/1.73M2
GLUCOSE BLD-MCNC: 180 MG/DL (ref 70–108)
GLUCOSE BLD-MCNC: 192 MG/DL (ref 70–108)
GLUCOSE BLD-MCNC: 222 MG/DL (ref 70–108)
HCT VFR BLD CALC: 33.9 % (ref 37–47)
HEMOGLOBIN: 10.8 GM/DL (ref 12–16)
INR BLD: 1.14 (ref 0.85–1.13)
MCH RBC QN AUTO: 32.1 PG (ref 26–33)
MCHC RBC AUTO-ENTMCNC: 31.9 GM/DL (ref 32.2–35.5)
MCV RBC AUTO: 100.9 FL (ref 81–99)
PLATELET # BLD: 83 THOU/MM3 (ref 130–400)
PMV BLD AUTO: 10 FL (ref 9.4–12.4)
POTASSIUM SERPL-SCNC: 4 MEQ/L (ref 3.5–5.2)
RBC # BLD: 3.36 MILL/MM3 (ref 4.2–5.4)
SODIUM BLD-SCNC: 140 MEQ/L (ref 135–145)
T4 FREE: 0.87 NG/DL (ref 0.93–1.76)
TOTAL PROTEIN: 5.8 G/DL (ref 6.1–8)
WBC # BLD: 3.9 THOU/MM3 (ref 4.8–10.8)

## 2019-08-29 PROCEDURE — 85610 PROTHROMBIN TIME: CPT

## 2019-08-29 PROCEDURE — 6370000000 HC RX 637 (ALT 250 FOR IP): Performed by: INTERNAL MEDICINE

## 2019-08-29 PROCEDURE — 80053 COMPREHEN METABOLIC PANEL: CPT

## 2019-08-29 PROCEDURE — 84439 ASSAY OF FREE THYROXINE: CPT

## 2019-08-29 PROCEDURE — 6370000000 HC RX 637 (ALT 250 FOR IP): Performed by: PHYSICIAN ASSISTANT

## 2019-08-29 PROCEDURE — 6360000002 HC RX W HCPCS: Performed by: PHYSICIAN ASSISTANT

## 2019-08-29 PROCEDURE — 97530 THERAPEUTIC ACTIVITIES: CPT

## 2019-08-29 PROCEDURE — 82948 REAGENT STRIP/BLOOD GLUCOSE: CPT

## 2019-08-29 PROCEDURE — 2580000003 HC RX 258: Performed by: PHYSICIAN ASSISTANT

## 2019-08-29 PROCEDURE — 85027 COMPLETE CBC AUTOMATED: CPT

## 2019-08-29 PROCEDURE — 36415 COLL VENOUS BLD VENIPUNCTURE: CPT

## 2019-08-29 PROCEDURE — 99239 HOSP IP/OBS DSCHRG MGMT >30: CPT | Performed by: FAMILY MEDICINE

## 2019-08-29 PROCEDURE — 6370000000 HC RX 637 (ALT 250 FOR IP): Performed by: FAMILY MEDICINE

## 2019-08-29 PROCEDURE — 97535 SELF CARE MNGMENT TRAINING: CPT

## 2019-08-29 RX ORDER — ONDANSETRON 4 MG/1
4 TABLET, ORALLY DISINTEGRATING ORAL EVERY 8 HOURS PRN
Qty: 30 TABLET | Refills: 0 | Status: SHIPPED | OUTPATIENT
Start: 2019-08-29

## 2019-08-29 RX ORDER — LEVOTHYROXINE SODIUM 300 UG/1
300 TABLET ORAL DAILY
Qty: 30 TABLET | Refills: 1 | Status: ON HOLD | OUTPATIENT
Start: 2019-08-30 | End: 2019-09-17 | Stop reason: DRUGHIGH

## 2019-08-29 RX ADMIN — SPIRONOLACTONE 50 MG: 25 TABLET ORAL at 08:05

## 2019-08-29 RX ADMIN — INSULIN LISPRO 2 UNITS: 100 INJECTION, SOLUTION INTRAVENOUS; SUBCUTANEOUS at 12:50

## 2019-08-29 RX ADMIN — INSULIN GLARGINE 5 UNITS: 100 INJECTION, SOLUTION SUBCUTANEOUS at 05:35

## 2019-08-29 RX ADMIN — CAPSAICIN: 0.25 CREAM TOPICAL at 08:05

## 2019-08-29 RX ADMIN — LEVOTHYROXINE SODIUM 300 MCG: 150 TABLET ORAL at 05:45

## 2019-08-29 RX ADMIN — SUCRALFATE 1 G: 1 TABLET ORAL at 05:45

## 2019-08-29 RX ADMIN — FUROSEMIDE 40 MG: 40 TABLET ORAL at 08:05

## 2019-08-29 RX ADMIN — SUCRALFATE 1 G: 1 TABLET ORAL at 11:17

## 2019-08-29 RX ADMIN — SERTRALINE 50 MG: 50 TABLET, FILM COATED ORAL at 08:05

## 2019-08-29 RX ADMIN — ONDANSETRON 4 MG: 2 INJECTION INTRAMUSCULAR; INTRAVENOUS at 11:45

## 2019-08-29 RX ADMIN — INSULIN LISPRO 1 UNITS: 100 INJECTION, SOLUTION INTRAVENOUS; SUBCUTANEOUS at 09:41

## 2019-08-29 RX ADMIN — SODIUM CHLORIDE, PRESERVATIVE FREE 10 ML: 5 INJECTION INTRAVENOUS at 08:06

## 2019-08-29 RX ADMIN — ENOXAPARIN SODIUM 40 MG: 40 INJECTION SUBCUTANEOUS at 08:05

## 2019-08-29 NOTE — PROGRESS NOTES
[facility] 5K  Occupational Therapy  Daily Note  Time:   Time In: 3830  Time Out: 0840  Timed Code Treatment Minutes: 34 Minutes  Minutes: 29          Date: 2019  Patient Name: Hanny Singh,   Gender: female      Room: -18/018-A  MRN: [id number]  : 1960  (60 y.o.)  Referring Practitioner: Dr. Jeffrey Torres  Diagnosis: ascites   Additional Pertinent Hx: 61 yo female was transferred to [facility] for GI services given ascites due to liver cirrhosis. Pt was seen here 2 weeks ago by GI. US of liver was performed showing cirrhosis. Pt had paracentesis performed removing 5L of fluid consistent with transudate and SAAG consistent with portal hypertension. Pt was discharged with diuretics Aldactone and Lasix but states that she has not been taking them for the past week due to nausea/vomiting when she would attempt to take her pills. Pt did not follow up with her PCP. The pt notes that she feels her belly was \"too full\" and that she could not keep her medicine down. She reports associated SOB. She reports frequent, loose black stools since being discharged 2 weeks ago    Restrictions/Precautions:  Restrictions/Precautions: Up as Tolerated, Fall Risk    SUBJECTIVE: Pt lying supine upon arrival, agreeable to OT session. RN okayed therapy session. PAIN: None. COGNITION: Decreased Problem Solving and Decreased Safety Awareness    ADL:   Grooming: with set-up. To wash face and apply deodorant while seated EOB. Bathing: Stand By Assistance. SBA to wash sanket area/bottom with 2 UE release. Pt able to complete UB care and wash BLE including B feet while seated EOB. Upper Extremity Dressing: Minimal Assistance. Elizabeth to manage hospital goen. Lower Extremity Dressing: Minimal Assistance. To adjust slipper socks while seated. BALANCE:  Sitting Balance:  Supervision  Standing Balance: Stand By Assistance  x1-2 minutes within ADLs.     BED MOBILITY:  Supine to Sit: Supervision

## 2019-08-29 NOTE — CARE COORDINATION
8/23/19, 2:06 PM    DISCHARGE BARRIERS    SW checked insurance website for [address] is not listed as a provider however, [facility] is. Call placed to [facility] and confirmed that they are not in network with [place].  Patient notified and is ok with referral to [facility]
8/29/19, 12:45 PM    Discharge plan discussed by  and . Discharge plan reviewed with patient/ family. Patient/ family verbalize understanding of discharge plan and are in agreement with plan. Understanding was demonstrated using the teach back method. Services After Discharge  Services At/After Discharge: Skilled Therapy, Nursing Services, Aide Services([facility])         Spoke with patient and she is in agreement with [address] [facility]. Spoke with Serenity Varner at [facility] and updated on discharge. Faxed order. AVS to be faxed by RN as patient is waiting for a MD to see. Fax number [phone] and RN aware that AVS needs faxed.
expects to be discharged to:  home with family  Expected Discharge date:  08/24/19  Follow Up Appointment: Best Day/ Time: Thursday PM    Discharge Plan: met with Lesvia; she lives home with her daughter Bisi Ramey. She no longer drives, uses walker (has 3), has wheel chair, and is agreeable to [place] at discharge. SW updated.      Evaluation: yes

## 2019-08-29 NOTE — PROGRESS NOTES
Patient updated about discharge. Daughter get's off work at 4 pm and will be able to give her a ride home around that time.

## 2019-08-29 NOTE — DISCHARGE SUMMARY
abuse  36. Depression -- cont zoloft  37. Morbid Obesity Body mass index is 46.11 kg/m². 45. Noncompliance -- educated -- plan for [facility] at d/c for assist    Lesvia was HD stable, marry po, afebrile. She was cleared by consultants for d/c. She was d/c home in stable condition with [facility] RN and therapies at d/c. Discharge Medications:   Kiah Jama   Home Medication Instructions MSL:[id number]    Printed on:08/29/19 1258   Medication Information                      furosemide (LASIX) 40 MG tablet  Take 1 tablet by mouth daily             glimepiride (AMARYL) 1 MG tablet  Take 2 mg by mouth 2 times daily             levothyroxine (SYNTHROID) 300 MCG tablet  Take 1 tablet by mouth Daily             nicotine (NICODERM CQ) 21 MG/24HR  Place 1 patch onto the skin daily             ondansetron (ZOFRAN ODT) 4 MG disintegrating tablet  Take 1 tablet by mouth every 8 hours as needed for Nausea or Vomiting             sertraline (ZOLOFT) 50 MG tablet  Take 50 mg by mouth daily             simvastatin (ZOCOR) 40 MG tablet  Take 40 mg by mouth nightly             spironolactone (ALDACTONE) 50 MG tablet  Take 1 tablet by mouth daily             sucralfate (CARAFATE) 1 GM tablet  Take 1 tablet by mouth 4 times daily (before meals and nightly)                 Patient Instructions:    Discharge lab work: none  Activity: activity as tolerated  Diet: DIET LOW SODIUM 2 GM; Carb Control: 4 carb choices (60 gms)/meal; 1800 ml  Dietary Nutrition Supplements: Protein Modular    Code Status: Full Code    Follow-up visits:   Ricardo Aguirre PA-C  [address] [facility] [address] [phone]    Go on 9/11/2019  For post hospital follow up at 9 am    [address]  [address]  [id number] [address] [facility] [place] [address]  [address]  [phone]    Go in 1 month  For post hospital follow up    [address] POC Glucose 182 (H) 70 - 108 mg/dl   Comprehensive metabolic panel    Collection Time: 08/27/19  7:31 AM   Result Value Ref Range    Glucose 186 (H) 70 - 108 mg/dL    CREATININE 1.6 (H) 0.4 - 1.2 mg/dL    BUN 17 7 - 22 mg/dL    Sodium 140 135 - 145 meq/L    Potassium 4.1 3.5 - 5.2 meq/L    Chloride 106 98 - 111 meq/L    CO2 24 23 - 33 meq/L    Calcium 8.6 8.5 - 10.5 mg/dL    AST 20 5 - 40 U/L    Alkaline Phosphatase 105 38 - 126 U/L    Total Protein 6.2 6.1 - 8.0 g/dL    Alb 3.1 (L) 3.5 - 5.1 g/dL    Total Bilirubin 0.4 0.3 - 1.2 mg/dL    ALT 14 11 - 66 U/L   Protime-INR    Collection Time: 08/27/19  7:31 AM   Result Value Ref Range    INR 1.16 (H) 0.85 - 1.13   CBC    Collection Time: 08/27/19  7:31 AM   Result Value Ref Range    WBC 4.1 (L) 4.8 - 10.8 thou/mm3    RBC 3.49 (L) 4.20 - 5.40 mill/mm3    Hemoglobin 11.3 (L) 12.0 - 16.0 gm/dl    Hematocrit 35.2 (L) 37.0 - 47.0 %    .9 (H) 81.0 - 99.0 fL    MCH 32.4 26.0 - 33.0 pg    MCHC 32.1 (L) 32.2 - 35.5 gm/dl    RDW-CV 13.8 11.5 - 14.5 %    RDW-SD 51.4 (H) 35.0 - 45.0 fL    Platelets 045 (L) 171 - 400 thou/mm3    MPV 10.0 9.4 - 12.4 fL   Anion Gap    Collection Time: 08/27/19  7:31 AM   Result Value Ref Range    Anion Gap 10.0 8.0 - 16.0 meq/L   Glomerular Filtration Rate, Estimated    Collection Time: 08/27/19  7:31 AM   Result Value Ref Range    Est, Glom Filt Rate 33 (A) ml/min/1.73m2   POCT glucose    Collection Time: 08/27/19  8:04 AM   Result Value Ref Range    POC Glucose 195 (H) 70 - 108 mg/dl   POCT glucose    Collection Time: 08/27/19 12:13 PM   Result Value Ref Range    POC Glucose 258 (H) 70 - 108 mg/dl   POCT glucose    Collection Time: 08/27/19  5:55 PM   Result Value Ref Range    POC Glucose 220 (H) 70 - 108 mg/dl   POCT glucose    Collection Time: 08/27/19  8:06 PM   Result Value Ref Range    POC Glucose 316 (H) 70 - 108 mg/dl   POCT glucose    Collection Time: 08/27/19 11:36 PM   Result Value Ref Range    POC Glucose 370 (H) 70 - 108 cells Body Fluid 101 0 - 500 /cumm    Body Fluid RBC < 2000 /cumm    Polymorphonuclear Cells Body Fluid 7.0 %    Mononuclear Cells Body Fluid 93.0 %    Mesothelial Cells Body Fluid 1+     Pathologist Review SMW    POCT glucose    Collection Time: 08/28/19  6:01 PM   Result Value Ref Range    POC Glucose 229 (H) 70 - 108 mg/dl   POCT glucose    Collection Time: 08/28/19  7:31 PM   Result Value Ref Range    POC Glucose 238 (H) 70 - 108 mg/dl   Comprehensive metabolic panel    Collection Time: 08/29/19  6:54 AM   Result Value Ref Range    Glucose 192 (H) 70 - 108 mg/dL    CREATININE 1.7 (H) 0.4 - 1.2 mg/dL    BUN 18 7 - 22 mg/dL    Sodium 140 135 - 145 meq/L    Potassium 4.0 3.5 - 5.2 meq/L    Chloride 104 98 - 111 meq/L    CO2 26 23 - 33 meq/L    Calcium 8.6 8.5 - 10.5 mg/dL    AST 16 5 - 40 U/L    Alkaline Phosphatase 88 38 - 126 U/L    Total Protein 5.8 (L) 6.1 - 8.0 g/dL    Alb 3.2 (L) 3.5 - 5.1 g/dL    Total Bilirubin 0.3 0.3 - 1.2 mg/dL    ALT 12 11 - 66 U/L   Protime-INR    Collection Time: 08/29/19  6:54 AM   Result Value Ref Range    INR 1.14 (H) 0.85 - 1.13   CBC    Collection Time: 08/29/19  6:54 AM   Result Value Ref Range    WBC 3.9 (L) 4.8 - 10.8 thou/mm3    RBC 3.36 (L) 4.20 - 5.40 mill/mm3    Hemoglobin 10.8 (L) 12.0 - 16.0 gm/dl    Hematocrit 33.9 (L) 37.0 - 47.0 %    .9 (H) 81.0 - 99.0 fL    MCH 32.1 26.0 - 33.0 pg    MCHC 31.9 (L) 32.2 - 35.5 gm/dl    RDW-CV 13.8 11.5 - 14.5 %    RDW-SD 51.2 (H) 35.0 - 45.0 fL    Platelets 83 (L) 303 - 400 thou/mm3    MPV 10.0 9.4 - 12.4 fL   T4, Free    Collection Time: 08/29/19  6:54 AM   Result Value Ref Range    T4 Free 0.87 (L) 0.93 - 1.76 ng/dL   Anion Gap    Collection Time: 08/29/19  6:54 AM   Result Value Ref Range    Anion Gap 10.0 8.0 - 16.0 meq/L   Glomerular Filtration Rate, Estimated    Collection Time: 08/29/19  6:54 AM   Result Value Ref Range    Est, Glom Filt Rate 31 (A) ml/min/1.73m2   POCT glucose    Collection Time: 08/29/19  8:23 AM

## 2019-08-30 NOTE — PROGRESS NOTES
CLINICAL PHARMACY NOTE: MEDS TO [address] Select Patient?: No  Total # of Prescriptions Filled: 1   The following medications were delivered to the patient:  Sucralfate 1gm  Total # of Interventions Completed: 2  Time Spent (min): 30    Additional Documentation:

## 2019-09-02 LAB
ANAEROBIC CULTURE: NORMAL
BODY FLUID CULTURE, STERILE: NORMAL
GRAM STAIN RESULT: NORMAL

## 2019-09-13 PROBLEM — E11.9 TYPE 2 DIABETES MELLITUS TREATED WITHOUT INSULIN (HCC): Status: ACTIVE | Noted: 2019-05-16

## 2019-09-13 PROBLEM — E78.5 HYPERLIPIDEMIA: Status: ACTIVE | Noted: 2019-05-16

## 2019-09-15 ENCOUNTER — HOSPITAL ENCOUNTER (INPATIENT)
Age: 59
LOS: 5 days | Discharge: HOME HEALTH | DRG: 280 | End: 2019-09-20
Attending: INTERNAL MEDICINE | Admitting: INTERNAL MEDICINE
Payer: MEDICAID

## 2019-09-15 PROCEDURE — 1200000003 HC TELEMETRY R&B

## 2019-09-15 PROCEDURE — 1200000000 HC SEMI PRIVATE

## 2019-09-15 RX ORDER — SODIUM CHLORIDE 0.9 % (FLUSH) 0.9 %
10 SYRINGE (ML) INJECTION EVERY 12 HOURS SCHEDULED
Status: DISCONTINUED | OUTPATIENT
Start: 2019-09-16 | End: 2019-09-20 | Stop reason: HOSPADM

## 2019-09-15 RX ORDER — ONDANSETRON 4 MG/1
4 TABLET, ORALLY DISINTEGRATING ORAL EVERY 8 HOURS PRN
Status: DISCONTINUED | OUTPATIENT
Start: 2019-09-15 | End: 2019-09-20 | Stop reason: HOSPADM

## 2019-09-15 RX ORDER — LEVOTHYROXINE SODIUM 0.15 MG/1
300 TABLET ORAL DAILY
Status: DISCONTINUED | OUTPATIENT
Start: 2019-09-16 | End: 2019-09-20 | Stop reason: HOSPADM

## 2019-09-15 RX ORDER — SODIUM CHLORIDE 0.9 % (FLUSH) 0.9 %
10 SYRINGE (ML) INJECTION EVERY 12 HOURS SCHEDULED
Status: DISCONTINUED | OUTPATIENT
Start: 2019-09-16 | End: 2019-09-16 | Stop reason: SDUPTHER

## 2019-09-15 RX ORDER — SODIUM CHLORIDE 0.9 % (FLUSH) 0.9 %
10 SYRINGE (ML) INJECTION PRN
Status: DISCONTINUED | OUTPATIENT
Start: 2019-09-15 | End: 2019-09-16 | Stop reason: SDUPTHER

## 2019-09-15 RX ORDER — POTASSIUM CHLORIDE 20 MEQ/1
40 TABLET, EXTENDED RELEASE ORAL PRN
Status: DISCONTINUED | OUTPATIENT
Start: 2019-09-15 | End: 2019-09-20 | Stop reason: HOSPADM

## 2019-09-15 RX ORDER — SODIUM CHLORIDE 0.9 % (FLUSH) 0.9 %
10 SYRINGE (ML) INJECTION PRN
Status: DISCONTINUED | OUTPATIENT
Start: 2019-09-15 | End: 2019-09-20 | Stop reason: HOSPADM

## 2019-09-15 RX ORDER — SIMVASTATIN 40 MG
40 TABLET ORAL NIGHTLY
Status: DISCONTINUED | OUTPATIENT
Start: 2019-09-16 | End: 2019-09-20 | Stop reason: HOSPADM

## 2019-09-15 RX ORDER — ONDANSETRON 2 MG/ML
4 INJECTION INTRAMUSCULAR; INTRAVENOUS EVERY 6 HOURS PRN
Status: DISCONTINUED | OUTPATIENT
Start: 2019-09-15 | End: 2019-09-20 | Stop reason: HOSPADM

## 2019-09-15 RX ORDER — NICOTINE 21 MG/24HR
1 PATCH, TRANSDERMAL 24 HOURS TRANSDERMAL DAILY
Status: DISCONTINUED | OUTPATIENT
Start: 2019-09-16 | End: 2019-09-20 | Stop reason: HOSPADM

## 2019-09-15 RX ORDER — POTASSIUM CHLORIDE 7.45 MG/ML
10 INJECTION INTRAVENOUS PRN
Status: DISCONTINUED | OUTPATIENT
Start: 2019-09-15 | End: 2019-09-20 | Stop reason: HOSPADM

## 2019-09-15 RX ORDER — FUROSEMIDE 40 MG/1
40 TABLET ORAL DAILY
Status: DISCONTINUED | OUTPATIENT
Start: 2019-09-16 | End: 2019-09-16

## 2019-09-15 RX ORDER — SPIRONOLACTONE 25 MG/1
100 TABLET ORAL DAILY
Status: DISCONTINUED | OUTPATIENT
Start: 2019-09-16 | End: 2019-09-16

## 2019-09-15 ASSESSMENT — PAIN DESCRIPTION - ONSET: ONSET: ON-GOING

## 2019-09-15 ASSESSMENT — PAIN DESCRIPTION - DESCRIPTORS: DESCRIPTORS: SHARP

## 2019-09-15 ASSESSMENT — PAIN DESCRIPTION - PAIN TYPE: TYPE: ACUTE PAIN

## 2019-09-15 ASSESSMENT — PAIN SCALES - GENERAL: PAINLEVEL_OUTOF10: 10

## 2019-09-15 ASSESSMENT — PAIN DESCRIPTION - PROGRESSION: CLINICAL_PROGRESSION: NOT CHANGED

## 2019-09-15 ASSESSMENT — PAIN DESCRIPTION - FREQUENCY: FREQUENCY: CONTINUOUS

## 2019-09-15 ASSESSMENT — PAIN DESCRIPTION - LOCATION: LOCATION: ABDOMEN

## 2019-09-15 ASSESSMENT — PAIN DESCRIPTION - ORIENTATION: ORIENTATION: LEFT;MID

## 2019-09-15 ASSESSMENT — PAIN - FUNCTIONAL ASSESSMENT: PAIN_FUNCTIONAL_ASSESSMENT: ACTIVITIES ARE NOT PREVENTED

## 2019-09-15 NOTE — PROGRESS NOTES
Patient is a 62year old from [facility] ED with Dr Ana Craig transferring. Patient with a history of copd and cirrhosis whom states that she hasn't drank etoh in 15 to 20 years and had a recent admission here for ascites/paracentesis (patient states that she had 21 L fluid drained). Presented to their ED c/o SOB and Left abdominal pain with abdominal distention. CT Abdomen showed portal HTN, cirrhosis and massive ascites. Ammonia 30. ALT 20. AST 12. Alk phos 133. Total bili 0.9. Creat 1.93. BUN 18. Glucose 361. Last VS 38A-29-/91-96% room air (placed on 2L nc for comfort). Abdomen protuberant. Lungs clear. . Trop normal. Hgb 11.5. WBC 5.2. Amylase and lipase normal. Albumin 2.7. Patient to be admitted as an inpatient to [facility] with dx of ascites under Dr Henrietta Mejía.

## 2019-09-16 ENCOUNTER — APPOINTMENT (OUTPATIENT)
Dept: ULTRASOUND IMAGING | Age: 59
DRG: 280 | End: 2019-09-16
Attending: INTERNAL MEDICINE
Payer: MEDICAID

## 2019-09-16 LAB
ALBUMIN SERPL-MCNC: 3.2 G/DL (ref 3.5–5.1)
ALP BLD-CCNC: 123 U/L (ref 38–126)
ALT SERPL-CCNC: 12 U/L (ref 11–66)
ANION GAP SERPL CALCULATED.3IONS-SCNC: 11 MEQ/L (ref 8–16)
AST SERPL-CCNC: 14 U/L (ref 5–40)
BILIRUB SERPL-MCNC: 0.5 MG/DL (ref 0.3–1.2)
BUN BLDV-MCNC: 21 MG/DL (ref 7–22)
CALCIUM SERPL-MCNC: 8.7 MG/DL (ref 8.5–10.5)
CHLORIDE BLD-SCNC: 103 MEQ/L (ref 98–111)
CO2: 22 MEQ/L (ref 23–33)
CREAT SERPL-MCNC: 1.9 MG/DL (ref 0.4–1.2)
ERYTHROCYTE [DISTWIDTH] IN BLOOD BY AUTOMATED COUNT: 13.8 % (ref 11.5–14.5)
ERYTHROCYTE [DISTWIDTH] IN BLOOD BY AUTOMATED COUNT: 50.6 FL (ref 35–45)
GFR SERPL CREATININE-BSD FRML MDRD: 27 ML/MIN/1.73M2
GLUCOSE BLD-MCNC: 340 MG/DL (ref 70–108)
HCT VFR BLD CALC: 34.5 % (ref 37–47)
HEMOGLOBIN: 11 GM/DL (ref 12–16)
INR BLD: 1.18 (ref 0.85–1.13)
LD, FLUID: 72 U/L
MCH RBC QN AUTO: 32 PG (ref 26–33)
MCHC RBC AUTO-ENTMCNC: 31.9 GM/DL (ref 32.2–35.5)
MCV RBC AUTO: 100.3 FL (ref 81–99)
PLATELET # BLD: 94 THOU/MM3 (ref 130–400)
PMV BLD AUTO: 10 FL (ref 9.4–12.4)
POTASSIUM REFLEX MAGNESIUM: 5.4 MEQ/L (ref 3.5–5.2)
RBC # BLD: 3.44 MILL/MM3 (ref 4.2–5.4)
SODIUM BLD-SCNC: 136 MEQ/L (ref 135–145)
TOTAL PROTEIN: 6.4 G/DL (ref 6.1–8)
WBC # BLD: 5.2 THOU/MM3 (ref 4.8–10.8)

## 2019-09-16 PROCEDURE — 6370000000 HC RX 637 (ALT 250 FOR IP): Performed by: INTERNAL MEDICINE

## 2019-09-16 PROCEDURE — 2580000003 HC RX 258: Performed by: INTERNAL MEDICINE

## 2019-09-16 PROCEDURE — 36415 COLL VENOUS BLD VENIPUNCTURE: CPT

## 2019-09-16 PROCEDURE — 6360000002 HC RX W HCPCS: Performed by: FAMILY MEDICINE

## 2019-09-16 PROCEDURE — 0W9G3ZZ DRAINAGE OF PERITONEAL CAVITY, PERCUTANEOUS APPROACH: ICD-10-PCS | Performed by: RADIOLOGY

## 2019-09-16 PROCEDURE — 85027 COMPLETE CBC AUTOMATED: CPT

## 2019-09-16 PROCEDURE — 87205 SMEAR GRAM STAIN: CPT

## 2019-09-16 PROCEDURE — 87070 CULTURE OTHR SPECIMN AEROBIC: CPT

## 2019-09-16 PROCEDURE — P9047 ALBUMIN (HUMAN), 25%, 50ML: HCPCS | Performed by: FAMILY MEDICINE

## 2019-09-16 PROCEDURE — 80053 COMPREHEN METABOLIC PANEL: CPT

## 2019-09-16 PROCEDURE — 1200000000 HC SEMI PRIVATE

## 2019-09-16 PROCEDURE — 87075 CULTR BACTERIA EXCEPT BLOOD: CPT

## 2019-09-16 PROCEDURE — 6370000000 HC RX 637 (ALT 250 FOR IP): Performed by: FAMILY MEDICINE

## 2019-09-16 PROCEDURE — 49083 ABD PARACENTESIS W/IMAGING: CPT

## 2019-09-16 PROCEDURE — 99233 SBSQ HOSP IP/OBS HIGH 50: CPT | Performed by: FAMILY MEDICINE

## 2019-09-16 PROCEDURE — 83615 LACTATE (LD) (LDH) ENZYME: CPT

## 2019-09-16 PROCEDURE — 85610 PROTHROMBIN TIME: CPT

## 2019-09-16 RX ORDER — FUROSEMIDE 40 MG/1
40 TABLET ORAL 2 TIMES DAILY
Status: DISCONTINUED | OUTPATIENT
Start: 2019-09-16 | End: 2019-09-20 | Stop reason: HOSPADM

## 2019-09-16 RX ORDER — ALBUMIN (HUMAN) 12.5 G/50ML
25 SOLUTION INTRAVENOUS ONCE
Status: COMPLETED | OUTPATIENT
Start: 2019-09-16 | End: 2019-09-16

## 2019-09-16 RX ORDER — LACTULOSE 10 G/15ML
20 SOLUTION ORAL 3 TIMES DAILY
Status: DISCONTINUED | OUTPATIENT
Start: 2019-09-16 | End: 2019-09-17

## 2019-09-16 RX ORDER — SPIRONOLACTONE 25 MG/1
50 TABLET ORAL DAILY
Status: DISCONTINUED | OUTPATIENT
Start: 2019-09-16 | End: 2019-09-20 | Stop reason: HOSPADM

## 2019-09-16 RX ADMIN — LACTULOSE 20 G: 20 SOLUTION ORAL at 08:30

## 2019-09-16 RX ADMIN — FUROSEMIDE 40 MG: 40 TABLET ORAL at 16:36

## 2019-09-16 RX ADMIN — FUROSEMIDE 40 MG: 40 TABLET ORAL at 08:24

## 2019-09-16 RX ADMIN — SIMVASTATIN 40 MG: 40 TABLET, FILM COATED ORAL at 20:08

## 2019-09-16 RX ADMIN — ALBUMIN (HUMAN) 25 G: 0.25 INJECTION, SOLUTION INTRAVENOUS at 11:58

## 2019-09-16 RX ADMIN — LACTULOSE 20 G: 20 SOLUTION ORAL at 20:08

## 2019-09-16 RX ADMIN — SERTRALINE 50 MG: 50 TABLET, FILM COATED ORAL at 08:24

## 2019-09-16 RX ADMIN — LACTULOSE 20 G: 20 SOLUTION ORAL at 14:10

## 2019-09-16 RX ADMIN — LEVOTHYROXINE SODIUM 300 MCG: 150 TABLET ORAL at 05:16

## 2019-09-16 RX ADMIN — SIMVASTATIN 40 MG: 40 TABLET, FILM COATED ORAL at 00:47

## 2019-09-16 RX ADMIN — Medication 10 ML: at 08:25

## 2019-09-16 RX ADMIN — SPIRONOLACTONE 50 MG: 25 TABLET ORAL at 08:24

## 2019-09-16 RX ADMIN — Medication 10 ML: at 20:08

## 2019-09-16 ASSESSMENT — PAIN DESCRIPTION - PROGRESSION: CLINICAL_PROGRESSION: NOT CHANGED

## 2019-09-16 ASSESSMENT — PAIN SCALES - GENERAL
PAINLEVEL_OUTOF10: 0
PAINLEVEL_OUTOF10: 9
PAINLEVEL_OUTOF10: 0
PAINLEVEL_OUTOF10: 5

## 2019-09-16 ASSESSMENT — PAIN DESCRIPTION - ORIENTATION: ORIENTATION: LEFT

## 2019-09-16 ASSESSMENT — PAIN DESCRIPTION - FREQUENCY: FREQUENCY: CONTINUOUS

## 2019-09-16 ASSESSMENT — PAIN - FUNCTIONAL ASSESSMENT: PAIN_FUNCTIONAL_ASSESSMENT: ACTIVITIES ARE NOT PREVENTED

## 2019-09-16 ASSESSMENT — PAIN DESCRIPTION - ONSET: ONSET: ON-GOING

## 2019-09-16 ASSESSMENT — PAIN DESCRIPTION - PAIN TYPE: TYPE: ACUTE PAIN

## 2019-09-16 ASSESSMENT — PAIN DESCRIPTION - LOCATION: LOCATION: ABDOMEN

## 2019-09-16 ASSESSMENT — PAIN DESCRIPTION - DESCRIPTORS: DESCRIPTORS: SHARP

## 2019-09-16 NOTE — H&P
Formulation and discussion of sedation / procedure plans, risks, benefits, side effects and alternatives with patient and/or responsible adult completed. Electronically signed by MIGUEL Webster DO on 9/16/2019 at 10:16 AM

## 2019-09-16 NOTE — PROGRESS NOTES
NEGATIVE 08/23/2019    WBCUA 0-2 08/23/2019    BACTERIA NONE 08/23/2019    RBCUA 3-5 08/23/2019    BLOODU MODERATE 08/23/2019    GLUCOSEU NEGATIVE 08/23/2019       Radiology:  US GUIDED PARACENTESIS    (Results Pending)       Diet: DIET GENERAL; No Added Salt (3-4 GM)    DVT prophylaxis: [] Lovenox                                 [] SCDs                                 [] SQ Heparin                                 [] Encourage ambulation           [] Already on Anticoagulation     Disposition:    [] Home       [] TCU       [] Rehab       [] Psych       [] SNF       [] [place]       [] Other-    Code Status: Full Code    PT/OT Eval Status:     Assessment/Plan:    Anticipated Discharge in :     C/Shira Ludwig 1106 Problems    Diagnosis Date Noted    Ascites [R18.8] 08/21/2019     Decompensated alcoholic cirrhosis with ascites  For paracentesis today, do not take more than 5L; albumin infusion x 1  continue aldactone 50 mg daily and Lasix 40mg BID   Continue low sodium diet and 1800 mL fluid restriction  LFT's normal  She may need scheduled OP paracentesis  GI consulted    RAMBO on CKD stage III  creat 1.9 on admission, new baseline of 1.5-1.7?   Avoid nephrotoxic medication  Monitor BMP, especially on diuretics and planned paracentesis  Nephro consult     Mild hyperkalemia  K 5.4 on admission  Likely due to aldactone, which was reduced to 50mg daily  Lasix increased to BID  monitor    Generalized abdominal pain  Due to #1  Planned paracentesis  No signs of infection, wbc normal, afebrile    Mild Non AG Metabolic acidosis  likely due to RAMBO and cirrhosis   Monitor     Mild Anemia, likely of chronic disease  Iron panel, B12 and folate on 8/1/2019 normal  Monitor hgb    Thrombocytopenia  Due to cirrhosis  No signs of bleeding  Monitor plts  No indication for transfusion at this time    Hypothyroidism  Recent , free T4 0.47 on last admission admission   Seen by Endocrinology  Started on synthroid 300mcg

## 2019-09-16 NOTE — BRIEF OP NOTE
Brief Postoperative Note    Vito Mohr  YOB: 1960  [id number]    Pre-operative Diagnosis: Ascites    Post-operative Diagnosis: Same    Procedure: US paracentesis    Anesthesia: Local    Surgeons/Assistants: MIGUEL Sifuentes DO    Estimated Blood Loss: less than 50     Complications: None    Specimens: Was Obtained: 70mL fluid. Findings: Clear, yellow fluid. Full report to follow in PACS. Electronically signed by MIGUEL Patel DO on 9/16/2019 at 10:16 AM

## 2019-09-16 NOTE — PROGRESS NOTES
Pt admitted to  0W75 via direct admit. Complaints: ascites. IV none infusing into the antecubital right, condition patent and no redness at a rate of 0 mls/ hour with about 0 mls in the bag still. IV site free of s/s of infection or infiltration. Vital signs obtained. Assessment and data collection initiated. Two nurse skin assessment performed by Sugar Wyatt and [employer]. Oriented to room. Policies and procedures for 6K explained. All questions answered with no further questions at this time. Fall prevention and safety brochure discussed with patient. Bed alarm on. Call light in reach.

## 2019-09-17 PROBLEM — F17.200 CURRENT SMOKER: Status: ACTIVE | Noted: 2019-05-16

## 2019-09-17 PROBLEM — E11.65 TYPE 2 DIABETES MELLITUS WITH HYPERGLYCEMIA, WITHOUT LONG-TERM CURRENT USE OF INSULIN (HCC): Status: ACTIVE | Noted: 2019-05-16

## 2019-09-17 LAB
ALBUMIN SERPL-MCNC: 2.8 G/DL (ref 3.5–5.1)
ALP BLD-CCNC: 119 U/L (ref 38–126)
ALT SERPL-CCNC: 14 U/L (ref 11–66)
ANION GAP SERPL CALCULATED.3IONS-SCNC: 11 MEQ/L (ref 8–16)
AST SERPL-CCNC: 18 U/L (ref 5–40)
BILIRUB SERPL-MCNC: 0.5 MG/DL (ref 0.3–1.2)
BUN BLDV-MCNC: 21 MG/DL (ref 7–22)
CALCIUM SERPL-MCNC: 8.4 MG/DL (ref 8.5–10.5)
CHLORIDE BLD-SCNC: 102 MEQ/L (ref 98–111)
CO2: 23 MEQ/L (ref 23–33)
CREAT SERPL-MCNC: 1.7 MG/DL (ref 0.4–1.2)
ERYTHROCYTE [DISTWIDTH] IN BLOOD BY AUTOMATED COUNT: 13.4 % (ref 11.5–14.5)
ERYTHROCYTE [DISTWIDTH] IN BLOOD BY AUTOMATED COUNT: 49 FL (ref 35–45)
GFR SERPL CREATININE-BSD FRML MDRD: 31 ML/MIN/1.73M2
GLUCOSE BLD-MCNC: 324 MG/DL (ref 70–108)
GLUCOSE BLD-MCNC: 328 MG/DL (ref 70–108)
GLUCOSE BLD-MCNC: 330 MG/DL (ref 70–108)
GLUCOSE BLD-MCNC: 339 MG/DL (ref 70–108)
GLUCOSE BLD-MCNC: 364 MG/DL (ref 70–108)
GLUCOSE BLD-MCNC: 369 MG/DL (ref 70–108)
HCT VFR BLD CALC: 31.9 % (ref 37–47)
HEMOGLOBIN: 10.2 GM/DL (ref 12–16)
MCH RBC QN AUTO: 31.7 PG (ref 26–33)
MCHC RBC AUTO-ENTMCNC: 32 GM/DL (ref 32.2–35.5)
MCV RBC AUTO: 99.1 FL (ref 81–99)
PLATELET # BLD: 84 THOU/MM3 (ref 130–400)
PMV BLD AUTO: 9.8 FL (ref 9.4–12.4)
POTASSIUM REFLEX MAGNESIUM: 4.7 MEQ/L (ref 3.5–5.2)
RBC # BLD: 3.22 MILL/MM3 (ref 4.2–5.4)
SCAN OF BLOOD SMEAR: NORMAL
SODIUM BLD-SCNC: 136 MEQ/L (ref 135–145)
TOTAL PROTEIN: 5.7 G/DL (ref 6.1–8)
WBC # BLD: 4.1 THOU/MM3 (ref 4.8–10.8)

## 2019-09-17 PROCEDURE — 97162 PT EVAL MOD COMPLEX 30 MIN: CPT

## 2019-09-17 PROCEDURE — 99232 SBSQ HOSP IP/OBS MODERATE 35: CPT | Performed by: INTERNAL MEDICINE

## 2019-09-17 PROCEDURE — 36415 COLL VENOUS BLD VENIPUNCTURE: CPT

## 2019-09-17 PROCEDURE — 6370000000 HC RX 637 (ALT 250 FOR IP): Performed by: INTERNAL MEDICINE

## 2019-09-17 PROCEDURE — 6370000000 HC RX 637 (ALT 250 FOR IP): Performed by: NURSE PRACTITIONER

## 2019-09-17 PROCEDURE — 80053 COMPREHEN METABOLIC PANEL: CPT

## 2019-09-17 PROCEDURE — 99999 PR OFFICE/OUTPT VISIT,PROCEDURE ONLY: CPT | Performed by: INTERNAL MEDICINE

## 2019-09-17 PROCEDURE — 6370000000 HC RX 637 (ALT 250 FOR IP): Performed by: FAMILY MEDICINE

## 2019-09-17 PROCEDURE — 1200000000 HC SEMI PRIVATE

## 2019-09-17 PROCEDURE — 2580000003 HC RX 258: Performed by: INTERNAL MEDICINE

## 2019-09-17 PROCEDURE — 99253 IP/OBS CNSLTJ NEW/EST LOW 45: CPT | Performed by: INTERNAL MEDICINE

## 2019-09-17 PROCEDURE — 85027 COMPLETE CBC AUTOMATED: CPT

## 2019-09-17 PROCEDURE — 82948 REAGENT STRIP/BLOOD GLUCOSE: CPT

## 2019-09-17 PROCEDURE — 97110 THERAPEUTIC EXERCISES: CPT

## 2019-09-17 RX ORDER — GLIMEPIRIDE 2 MG/1
2 TABLET ORAL 2 TIMES DAILY
Status: ON HOLD | COMMUNITY
End: 2019-09-20 | Stop reason: HOSPADM

## 2019-09-17 RX ORDER — DEXTROSE MONOHYDRATE 50 MG/ML
100 INJECTION, SOLUTION INTRAVENOUS PRN
Status: DISCONTINUED | OUTPATIENT
Start: 2019-09-17 | End: 2019-09-20 | Stop reason: HOSPADM

## 2019-09-17 RX ORDER — INSULIN GLARGINE 100 [IU]/ML
15 INJECTION, SOLUTION SUBCUTANEOUS DAILY
Status: DISCONTINUED | OUTPATIENT
Start: 2019-09-17 | End: 2019-09-19

## 2019-09-17 RX ORDER — LEVOTHYROXINE SODIUM 0.15 MG/1
300 TABLET ORAL DAILY
Status: ON HOLD | COMMUNITY
End: 2019-09-20 | Stop reason: SDUPTHER

## 2019-09-17 RX ORDER — FAMOTIDINE 20 MG/1
20 TABLET, FILM COATED ORAL DAILY
Status: DISCONTINUED | OUTPATIENT
Start: 2019-09-17 | End: 2019-09-20 | Stop reason: HOSPADM

## 2019-09-17 RX ORDER — POLYETHYLENE GLYCOL 3350 17 G/17G
17 POWDER, FOR SOLUTION ORAL 2 TIMES DAILY
Status: DISCONTINUED | OUTPATIENT
Start: 2019-09-17 | End: 2019-09-20

## 2019-09-17 RX ORDER — NICOTINE POLACRILEX 4 MG
15 LOZENGE BUCCAL PRN
Status: DISCONTINUED | OUTPATIENT
Start: 2019-09-17 | End: 2019-09-20 | Stop reason: HOSPADM

## 2019-09-17 RX ORDER — SPIRONOLACTONE 50 MG/1
50 TABLET, FILM COATED ORAL DAILY
Status: ON HOLD | COMMUNITY
End: 2019-09-20 | Stop reason: HOSPADM

## 2019-09-17 RX ORDER — DEXTROSE MONOHYDRATE 25 G/50ML
12.5 INJECTION, SOLUTION INTRAVENOUS PRN
Status: DISCONTINUED | OUTPATIENT
Start: 2019-09-17 | End: 2019-09-20 | Stop reason: HOSPADM

## 2019-09-17 RX ORDER — DOCUSATE SODIUM 100 MG/1
100 CAPSULE, LIQUID FILLED ORAL 2 TIMES DAILY
Status: DISCONTINUED | OUTPATIENT
Start: 2019-09-17 | End: 2019-09-20 | Stop reason: HOSPADM

## 2019-09-17 RX ADMIN — POLYETHYLENE GLYCOL 3350 17 G: 17 POWDER, FOR SOLUTION ORAL at 11:38

## 2019-09-17 RX ADMIN — SERTRALINE 50 MG: 50 TABLET, FILM COATED ORAL at 08:16

## 2019-09-17 RX ADMIN — FAMOTIDINE 20 MG: 20 TABLET ORAL at 11:32

## 2019-09-17 RX ADMIN — DOCUSATE SODIUM 100 MG: 100 CAPSULE, LIQUID FILLED ORAL at 11:38

## 2019-09-17 RX ADMIN — INSULIN GLARGINE 15 UNITS: 100 INJECTION, SOLUTION SUBCUTANEOUS at 11:32

## 2019-09-17 RX ADMIN — SIMVASTATIN 40 MG: 40 TABLET, FILM COATED ORAL at 20:09

## 2019-09-17 RX ADMIN — SPIRONOLACTONE 50 MG: 25 TABLET ORAL at 08:16

## 2019-09-17 RX ADMIN — Medication 10 ML: at 20:10

## 2019-09-17 RX ADMIN — DOCUSATE SODIUM 100 MG: 100 CAPSULE, LIQUID FILLED ORAL at 20:09

## 2019-09-17 RX ADMIN — LEVOTHYROXINE SODIUM 300 MCG: 150 TABLET ORAL at 05:15

## 2019-09-17 RX ADMIN — INSULIN LISPRO 5 UNITS: 100 INJECTION, SOLUTION INTRAVENOUS; SUBCUTANEOUS at 01:29

## 2019-09-17 RX ADMIN — FUROSEMIDE 40 MG: 40 TABLET ORAL at 08:16

## 2019-09-17 RX ADMIN — INSULIN LISPRO 10 UNITS: 100 INJECTION, SOLUTION INTRAVENOUS; SUBCUTANEOUS at 11:33

## 2019-09-17 RX ADMIN — FUROSEMIDE 40 MG: 40 TABLET ORAL at 17:14

## 2019-09-17 RX ADMIN — INSULIN LISPRO 4 UNITS: 100 INJECTION, SOLUTION INTRAVENOUS; SUBCUTANEOUS at 20:10

## 2019-09-17 RX ADMIN — INSULIN LISPRO 8 UNITS: 100 INJECTION, SOLUTION INTRAVENOUS; SUBCUTANEOUS at 08:16

## 2019-09-17 RX ADMIN — Medication 10 ML: at 08:16

## 2019-09-17 RX ADMIN — INSULIN LISPRO 8 UNITS: 100 INJECTION, SOLUTION INTRAVENOUS; SUBCUTANEOUS at 17:14

## 2019-09-17 RX ADMIN — LACTULOSE 20 G: 20 SOLUTION ORAL at 08:16

## 2019-09-17 ASSESSMENT — PAIN DESCRIPTION - PROGRESSION
CLINICAL_PROGRESSION: NOT CHANGED

## 2019-09-17 ASSESSMENT — PAIN SCALES - GENERAL
PAINLEVEL_OUTOF10: 0
PAINLEVEL_OUTOF10: 5
PAINLEVEL_OUTOF10: 0

## 2019-09-17 ASSESSMENT — PAIN DESCRIPTION - FREQUENCY: FREQUENCY: CONTINUOUS

## 2019-09-17 ASSESSMENT — PAIN - FUNCTIONAL ASSESSMENT: PAIN_FUNCTIONAL_ASSESSMENT: PREVENTS OR INTERFERES SOME ACTIVE ACTIVITIES AND ADLS

## 2019-09-17 ASSESSMENT — PAIN DESCRIPTION - DESCRIPTORS: DESCRIPTORS: PRESSURE

## 2019-09-17 ASSESSMENT — PAIN DESCRIPTION - PAIN TYPE: TYPE: ACUTE PAIN

## 2019-09-17 ASSESSMENT — PAIN DESCRIPTION - ONSET: ONSET: ON-GOING

## 2019-09-17 ASSESSMENT — PAIN DESCRIPTION - ORIENTATION: ORIENTATION: MID;LOWER;UPPER

## 2019-09-17 ASSESSMENT — PAIN DESCRIPTION - LOCATION: LOCATION: ABDOMEN

## 2019-09-17 NOTE — PROGRESS NOTES
bedroom/bathroom  Home Access: Stairs to enter without rails  Entrance Stairs - Number of Steps: 1  Home Equipment: Rolling walker, 4 wheeled walker, Wheelchair-manual        Ambulation Assistance: Independent  Transfer Assistance: Independent    Active : No     Additional Comments: Pt amb without AD, current with HH RN, PT/OT    OBJECTIVE:  Range of Motion:  Bilateral Lower Extremity: WFL    Strength:  Bilateral Lower Extremity: Impaired - grossly 4/5    Balance:  Static Standing Balance: Contact Guard Assistance  Dynamic Standing Balance: Contact Guard Assistance    Bed Mobility:  Supine to Sit: Stand By Assistance  Sit to Supine: Stand By Assistance     Transfers:  Sit to Stand: Stand By Assistance  Stand to Sit:Stand By Assistance    Ambulation:  Contact Guard Assistance  Distance: 80 feet  Surface: Level Tile  Device:No Device  Gait Deviations:  Slow Stefania, Decreased Step Length Bilaterally and Mild Path Deviations  **Recommend RW due to slight shakiness, mild path deviations    Exercise:  Patient was guided in 1 set(s) 10 reps of exercise to both lower extremities. Ankle pumps, Long arc quads and Seated hip flexion. Exercises were completed for increased independence with functional mobility. Functional Outcome Measures: Not completed       ASSESSMENT:  Activity Tolerance:  Patient tolerance of  treatment: good. Treatment Initiated: Treatment and education initiated within context of evaluation. Evaluation time included review of current medical information, gathering information related to past medical, social and functional history, completion of standardized testing, formal and informal observation of tasks, assessment of data and development of plan of care and goals. Treatment time included skilled education and facilitation of tasks to increase safety and independence with functional mobility for improved independence and quality of life. See above exercises. Assessment:   Body structures, Functions, Activity limitations: Decreased functional mobility , Decreased endurance, Decreased strength, Decreased balance  Assessment: Pt tolerates session well, limited by generalized weakness, education to amb in hallway with staff at least 3x/day. Education on use of RW for now until more stable. PT to continue to progress strength and functional mobility. Prognosis: Good    REQUIRES PT FOLLOW UP: Yes    Discharge Recommendations:  Discharge Recommendations: Home with assist PRN, Home with Home health PT    Patient Education:  PT Education: PT Role, Plan of Care, Equipment    Equipment Recommendations:  Equipment Needed: No    Plan:  Times per week: 3-5 X GM  Current Treatment Recommendations: Strengthening, Functional Mobility Training, Transfer Training, Balance Training, Gait Training, Stair training, Equipment Evaluation, Education, & procurement, Patient/Caregiver Education & Training    Goals:  Patient goals : to go home with [facility]  Short term goals  Time Frame for Short term goals: by discharge  Short term goal 1: Pt to transfer sit <--> stand mod I for increased functional mobility. Short term goal 2: Pt to ambulate >200 feet with LRAD SBA for household ambulation. Long term goals  Time Frame for Long term goals : NA due to short length of stay. Following session, patient left in safe position with all fall risk precautions in place.

## 2019-09-17 NOTE — PROGRESS NOTES
LIPASE 29.3 08/21/2019     Current Meds:  Scheduled Meds:   insulin lispro  0-12 Units Subcutaneous TID WC    insulin lispro  0-6 Units Subcutaneous Nightly    [START ON 9/18/2019] influenza virus vaccine  0.5 mL Intramuscular Once    famotidine  20 mg Oral Daily    insulin glargine  15 Units Subcutaneous Daily    polyethylene glycol  17 g Oral BID    docusate sodium  100 mg Oral BID    spironolactone  50 mg Oral Daily    furosemide  40 mg Oral BID    levothyroxine  300 mcg Oral Daily    nicotine  1 patch Transdermal Daily    sertraline  50 mg Oral Daily    simvastatin  40 mg Oral Nightly    sodium chloride flush  10 mL Intravenous 2 times per day     Assessment:  63 yo F admitted 09/15/19 for abdominal swelling and SOB. She has a history of cirrhosis secondary to alcoholism. US paracentesis 09/17/19 with 8L ascites fluid removed, negative for bacterial growth. She has a history of constipation. Lasix increased to 40mg BID. She was hospitalized last month with similar complaints and a total of 16L ascites fluid removed. She is need of outpatient cirrhosis work-up and to get on a routine schedule for paracentesis. 1. Ascites  2. SOB  3. RAMBO on CKD  4. Cirrhosis secondary to alcohol abuse  5. HTN  6. COPD  7. DM  8. Thrombocytopenia  9. Anemia, stable    Plan:    1. Continue Lasix 40mg BID  2. Continue Aldactone daily, increase to 100mg when okay with nephrology  3. Low sodium, carb count diet  4. Miralax BID  5. Colace BID  6. MOM prn  7.  Discussed the importance of medication and outpatient follow-up compliance with patient, verbalized understanding for management & further work-up of cirrhosis as well as establishing a paracentesis schedule   Hopefully she will be discharged in time for her to keep her appointment in the office on 09/25/19  Will follow    Case reviewed and impression/plan reviewed in collaboration with Dr. Eugenia Horvath  Electronically signed by Jolee Angelucci, APRN - CNP on 9/17/2019 at 10:23 AM    [facility]

## 2019-09-17 NOTE — PLAN OF CARE
Problem: Pain:  Goal: Pain level will decrease  Description  Pain level will decrease  9/16/2019 2306 by Allyson Lerner RN  Outcome: Ongoing  Note:   No complaint of pain voiced at this time. Continue to monitor. PRN medications available if needed. 9/16/2019 1014 by Arun Somers RN  Outcome: Ongoing  Note:   Pt rates pain 5/10, with pain goal of 2. Denies need for any pain medication stating that laying on her side helps and once she gets her paracentesis that will help a lot. Goal: Control of acute pain  Description  Control of acute pain  Outcome: Ongoing  Note:   No complaint of pain voiced at this time. Continue to monitor. PRN medications available if needed. Goal: Control of chronic pain  Description  Control of chronic pain  Outcome: Ongoing  Note:   No complaint of pain voiced at this time. Continue to monitor. PRN medications available if needed. Problem: Falls - Risk of:  Goal: Will remain free from falls  Description  Will remain free from falls  9/16/2019 2306 by Allyson Lerner RN  Outcome: Ongoing  Note:   No falls noted this shift. Continue falling star program. Bed alarm on, bed in low position. Call light and personal belongings in reach. Patient uses call light appropriately. 9/16/2019 1014 by Arun Somers RN  Outcome: Ongoing  Note:   No falls this shift, call light in reach. Bed alarm on. Up with 1 assist.   Goal: Absence of physical injury  Description  Absence of physical injury  Outcome: Ongoing  Note:   No falls noted this shift. Continue falling star program. Bed alarm on, bed in low position. Call light and personal belongings in reach. Patient uses call light appropriately. Problem: Discharge Planning:  Goal: Participates in care planning  Description  Participates in care planning  9/16/2019 2306 by Allyson Lerner RN  Outcome: Ongoing  Note:   Care plan reviewed with patient.  Patient verbalizes understanding of plan of care and contributes to goal setting. 9/16/2019 1014 by Francisca August RN  Outcome: Ongoing  Note:   Pt kept up to date on plan of care  Goal: Discharged to appropriate level of care  Description  Discharged to appropriate level of care  9/16/2019 2306 by Stacy Nina RN  Outcome: Ongoing  Note:   Plans to return home with family at discharge. 9/16/2019 1014 by Francisca August RN  Outcome: Ongoing  Note:   Plans on returning home with family and home health at discharge.  assisting with discharge needs. Problem: Skin Integrity - Impaired:  Goal: Absence of new skin breakdown  Description  Absence of new skin breakdown  9/16/2019 2306 by Stacy Nina RN  Outcome: Ongoing  Note:   No skin break down noted at this time. Encouraged patient to reposition self in bed.    9/16/2019 1014 by Francisca August RN  Outcome: Ongoing  Note:   Pt turns and repositions self frequently      Problem: Fluid Volume - Imbalance:  Goal: Absence of imbalanced fluid volume signs and symptoms  Description  Absence of imbalanced fluid volume signs and symptoms  9/16/2019 2306 by Stacy Nina RN  Outcome: Ongoing  Note:   Had paracentesis today, 8L removed. Tolerating oral intake. 9/16/2019 1014 by Francisca August RN  Outcome: Ongoing  Note:   Accurate I/O in progress. Care plan reviewed with patient. Patient verbalizes understanding of plan of care and contributes to goal setting.

## 2019-09-17 NOTE — CONSULTS
Consult History & Physical      Patient:  Renato Stoddard  YOB: 1960  MRN: [id number]     Acct: [de-identified]    Chief Complaint:  No chief complaint on file. Date of Service: Pt seen/examined in consultation on 2019    History Of Present Illness:      62 y.o. female who we are asked to see/evaluate by Gerry Wilson MD for medical management of cirrhosis. Admitted 09/15/19 for abdominal swelling and SOB that started a week ago. She had a paracentesis today and feels better. Denies abdominal pain, nausea, vomiting, and blood in the stool. She has had loose stools for the past couple days, but is not emptying her bowels completely. She was taking Lasix 40mg daily and Aldactone 100mg daily at home. She has never been seen outpatient for a proper cirrhosis work-up and to establish a paracentesis routine schedule. She has an appointment 19 with Wesley [facility]. She was recently hospitalized last month with similar complaints and had a total of 16L of ascites fluid removed. It was noted upon blood work in the ED that she has RAMBO on CKD which she had last admission. She has a history of arthritis, CKD, COPD, DM, GERD, hyperlipidemia, HTN, thyroid disease, alcohol abuse, and cirrhosis secondary to alcohol abuse.      Past Medical History:    Past Medical History:   Diagnosis Date    Arthritis     Chronic kidney disease     [facility] [address] [facility]     Diabetes mellitus ([facility] [address] [facility]     GERD (gastroesophageal reflux disease)     H/O ETOH abuse     Heart abnormality     Hyperlipidemia     Hypertension     Liver disease     Thyroid disease        Surgical History:  Past Surgical History:   Procedure Laterality Date    BACK SURGERY       SECTION      ENDOSCOPY, COLON, DIAGNOSTIC      EYE SURGERY      cataracts    HYSTERECTOMY      LUMBAR [address] SURGERY  2016    PARACENTESIS      UPPER GASTROINTESTINAL ENDOSCOPY Left 2019    EGD BIOPSY
and were negative. ROS:Constitutional: negative  Eyes: negative  Ears, nose, mouth, throat, and face: negative  Respiratory: negative  Cardiovascular: negative  Gastrointestinal: negative  Genitourinary:negative  Integument/breast: negative  Hematologic/lymphatic: negative  Musculoskeletal:positive for arthralgias and stiff joints  Neurological: positive for coordination problems and gait problems  Behavioral/Psych: negative  Endocrine: negative  Allergic/Immunologic: negative    Physical exam:   Constitutional: Chronically ill looking middle-aged lady in no distress. Vitals:   Vitals:    09/17/19 1135   BP: (!) 110/59   Pulse: 80   Resp: 17   Temp: 97.8 °F (36.6 °C)   SpO2: 98%       Skin: no rash, turgor is normal.  Heent: Pupils are reactive to light and accommodation. Throat is clear. Neck: no bruits or jvd noted  Cardiovascular:  S1, S2 without murmur. Respiratory: Clear to auscultation. Abdomen: Abdomen is distended. Nontender. No bruit. No palpable mass. Ext: No lower extremity edema  Psychiatric: mood and affect appropriate  Musculoskeletal:  Rom, muscular strength intact  CNS: Awake and alert when I saw her this morning. Speech is normal.  No obvious focal deficit.     Data:   Labs:  Lab Results   Component Value Date     09/17/2019     09/16/2019     08/29/2019    K 4.7 09/17/2019    K 5.4 (H) 09/16/2019    K 4.0 08/29/2019     09/17/2019    CO2 23 09/17/2019    CO2 22 (L) 09/16/2019    CO2 26 08/29/2019    CREATININE 1.7 (H) 09/17/2019    CREATININE 1.9 (H) 09/16/2019    CREATININE 1.7 (H) 08/29/2019    BUN 21 09/17/2019    BUN 21 09/16/2019    BUN 18 08/29/2019    GLUCOSE 328 (H) 09/17/2019    GLUCOSE 340 (H) 09/16/2019    GLUCOSE 192 (H) 08/29/2019    WBC 4.1 (L) 09/17/2019    WBC 5.2 09/16/2019    WBC 3.9 (L) 08/29/2019    HGB 10.2 (L) 09/17/2019    HGB 11.0 (L) 09/16/2019    HGB 10.8 (L) 08/29/2019    HCT 31.9 (L) 09/17/2019    HCT 34.5 (L) 09/16/2019    HCT 33.9 (L)

## 2019-09-18 ENCOUNTER — APPOINTMENT (OUTPATIENT)
Dept: ULTRASOUND IMAGING | Age: 59
DRG: 280 | End: 2019-09-18
Attending: INTERNAL MEDICINE
Payer: MEDICAID

## 2019-09-18 ENCOUNTER — APPOINTMENT (OUTPATIENT)
Dept: CT IMAGING | Age: 59
DRG: 280 | End: 2019-09-18
Attending: INTERNAL MEDICINE
Payer: MEDICAID

## 2019-09-18 LAB
ANION GAP SERPL CALCULATED.3IONS-SCNC: 12 MEQ/L (ref 8–16)
BUN BLDV-MCNC: 23 MG/DL (ref 7–22)
CALCIUM SERPL-MCNC: 8.5 MG/DL (ref 8.5–10.5)
CHLORIDE BLD-SCNC: 102 MEQ/L (ref 98–111)
CO2: 23 MEQ/L (ref 23–33)
CREAT SERPL-MCNC: 2.2 MG/DL (ref 0.4–1.2)
GFR SERPL CREATININE-BSD FRML MDRD: 23 ML/MIN/1.73M2
GLUCOSE BLD-MCNC: 249 MG/DL (ref 70–108)
GLUCOSE BLD-MCNC: 250 MG/DL (ref 70–108)
GLUCOSE BLD-MCNC: 255 MG/DL (ref 70–108)
GLUCOSE BLD-MCNC: 256 MG/DL (ref 70–108)
GLUCOSE BLD-MCNC: 352 MG/DL (ref 70–108)
POTASSIUM SERPL-SCNC: 4.5 MEQ/L (ref 3.5–5.2)
SODIUM BLD-SCNC: 137 MEQ/L (ref 135–145)

## 2019-09-18 PROCEDURE — 1200000000 HC SEMI PRIVATE

## 2019-09-18 PROCEDURE — 0W9G3ZZ DRAINAGE OF PERITONEAL CAVITY, PERCUTANEOUS APPROACH: ICD-10-PCS | Performed by: RADIOLOGY

## 2019-09-18 PROCEDURE — 80048 BASIC METABOLIC PNL TOTAL CA: CPT

## 2019-09-18 PROCEDURE — 74176 CT ABD & PELVIS W/O CONTRAST: CPT

## 2019-09-18 PROCEDURE — 2580000003 HC RX 258: Performed by: INTERNAL MEDICINE

## 2019-09-18 PROCEDURE — 6370000000 HC RX 637 (ALT 250 FOR IP): Performed by: INTERNAL MEDICINE

## 2019-09-18 PROCEDURE — 99232 SBSQ HOSP IP/OBS MODERATE 35: CPT | Performed by: INTERNAL MEDICINE

## 2019-09-18 PROCEDURE — 49083 ABD PARACENTESIS W/IMAGING: CPT

## 2019-09-18 PROCEDURE — P9047 ALBUMIN (HUMAN), 25%, 50ML: HCPCS | Performed by: INTERNAL MEDICINE

## 2019-09-18 PROCEDURE — 36415 COLL VENOUS BLD VENIPUNCTURE: CPT

## 2019-09-18 PROCEDURE — 97165 OT EVAL LOW COMPLEX 30 MIN: CPT

## 2019-09-18 PROCEDURE — 6370000000 HC RX 637 (ALT 250 FOR IP): Performed by: NURSE PRACTITIONER

## 2019-09-18 PROCEDURE — 82948 REAGENT STRIP/BLOOD GLUCOSE: CPT

## 2019-09-18 PROCEDURE — 97110 THERAPEUTIC EXERCISES: CPT

## 2019-09-18 PROCEDURE — 6370000000 HC RX 637 (ALT 250 FOR IP): Performed by: FAMILY MEDICINE

## 2019-09-18 PROCEDURE — 6360000002 HC RX W HCPCS: Performed by: INTERNAL MEDICINE

## 2019-09-18 RX ORDER — ALBUMIN (HUMAN) 12.5 G/50ML
50 SOLUTION INTRAVENOUS ONCE
Status: COMPLETED | OUTPATIENT
Start: 2019-09-18 | End: 2019-09-18

## 2019-09-18 RX ADMIN — Medication 10 ML: at 09:21

## 2019-09-18 RX ADMIN — FAMOTIDINE 20 MG: 20 TABLET ORAL at 09:20

## 2019-09-18 RX ADMIN — SERTRALINE 50 MG: 50 TABLET, FILM COATED ORAL at 09:20

## 2019-09-18 RX ADMIN — SIMVASTATIN 40 MG: 40 TABLET, FILM COATED ORAL at 21:45

## 2019-09-18 RX ADMIN — POLYETHYLENE GLYCOL 3350 17 G: 17 POWDER, FOR SOLUTION ORAL at 21:44

## 2019-09-18 RX ADMIN — INSULIN GLARGINE 15 UNITS: 100 INJECTION, SOLUTION SUBCUTANEOUS at 09:21

## 2019-09-18 RX ADMIN — LEVOTHYROXINE SODIUM 300 MCG: 150 TABLET ORAL at 05:21

## 2019-09-18 RX ADMIN — Medication 10 ML: at 21:45

## 2019-09-18 RX ADMIN — INSULIN LISPRO 4 UNITS: 100 INJECTION, SOLUTION INTRAVENOUS; SUBCUTANEOUS at 07:55

## 2019-09-18 RX ADMIN — INSULIN LISPRO 10 UNITS: 100 INJECTION, SOLUTION INTRAVENOUS; SUBCUTANEOUS at 12:45

## 2019-09-18 RX ADMIN — INSULIN LISPRO 3 UNITS: 100 INJECTION, SOLUTION INTRAVENOUS; SUBCUTANEOUS at 21:41

## 2019-09-18 RX ADMIN — ALBUMIN (HUMAN) 50 ML: 0.25 INJECTION, SOLUTION INTRAVENOUS at 16:56

## 2019-09-18 RX ADMIN — DOCUSATE SODIUM 100 MG: 100 CAPSULE, LIQUID FILLED ORAL at 09:20

## 2019-09-18 RX ADMIN — DOCUSATE SODIUM 100 MG: 100 CAPSULE, LIQUID FILLED ORAL at 21:44

## 2019-09-18 RX ADMIN — INSULIN LISPRO 6 UNITS: 100 INJECTION, SOLUTION INTRAVENOUS; SUBCUTANEOUS at 16:56

## 2019-09-18 RX ADMIN — POLYETHYLENE GLYCOL 3350 17 G: 17 POWDER, FOR SOLUTION ORAL at 09:20

## 2019-09-18 ASSESSMENT — PAIN SCALES - GENERAL
PAINLEVEL_OUTOF10: 7
PAINLEVEL_OUTOF10: 0
PAINLEVEL_OUTOF10: 0

## 2019-09-18 ASSESSMENT — PAIN DESCRIPTION - FREQUENCY: FREQUENCY: CONTINUOUS

## 2019-09-18 ASSESSMENT — PAIN - FUNCTIONAL ASSESSMENT: PAIN_FUNCTIONAL_ASSESSMENT: PREVENTS OR INTERFERES SOME ACTIVE ACTIVITIES AND ADLS

## 2019-09-18 ASSESSMENT — PAIN DESCRIPTION - PAIN TYPE: TYPE: ACUTE PAIN

## 2019-09-18 ASSESSMENT — PAIN DESCRIPTION - ORIENTATION: ORIENTATION: MID;UPPER

## 2019-09-18 ASSESSMENT — PAIN DESCRIPTION - DESCRIPTORS: DESCRIPTORS: PRESSURE

## 2019-09-18 ASSESSMENT — PAIN DESCRIPTION - ONSET: ONSET: ON-GOING

## 2019-09-18 ASSESSMENT — PAIN DESCRIPTION - PROGRESSION: CLINICAL_PROGRESSION: NOT CHANGED

## 2019-09-18 ASSESSMENT — PAIN DESCRIPTION - LOCATION: LOCATION: ABDOMEN

## 2019-09-18 NOTE — PLAN OF CARE
Problem: Pain:  Goal: Pain level will decrease  Description  Pain level will decrease  Outcome: Ongoing  Note:   No complaint of pain voiced at this time. Continue to monitor. PRN medications available if needed. Problem: Falls - Risk of:  Goal: Will remain free from falls  Description  Will remain free from falls    Outcome: Ongoing  Note:   No falls noted this shift. Continue falling star program. Bed alarm on, bed in low position. Call light and personal belongings in reach. Patient uses call light appropriately. Problem: Discharge Planning:  Goal: Participates in care planning  Description  Participates in care planning    Outcome: Ongoing  Note:   Care plan reviewed with patient. Patient verbalizes understanding of plan of care and contributes to goal setting. Goal: Discharged to appropriate level of care  Description  Discharged to appropriate level of care    Outcome: Ongoing  Note:   Plans to return home with family at discharge. Problem: Skin Integrity - Impaired:  Goal: Absence of new skin breakdown  Description  Absence of new skin breakdown    Outcome: Ongoing  Note:   No skin break down noted at this time. Encouraged patient to reposition self in bed. Problem: Fluid Volume - Imbalance:  Goal: Absence of imbalanced fluid volume signs and symptoms  Description  Absence of imbalanced fluid volume signs and symptoms    Outcome: Ongoing  Note:   Had paracentesis today, 8L removed. Tolerating oral intake. Care plan reviewed with patient. Patient verbalizes understanding of plan of care and contributes to goal setting.

## 2019-09-18 NOTE — PROGRESS NOTES
Meds:   albumin human  50 mL Intravenous Once    insulin lispro  0-12 Units Subcutaneous TID WC    insulin lispro  0-6 Units Subcutaneous Nightly    influenza virus vaccine  0.5 mL Intramuscular Once    famotidine  20 mg Oral Daily    insulin glargine  15 Units Subcutaneous Daily    polyethylene glycol  17 g Oral BID    docusate sodium  100 mg Oral BID    [Held by provider] spironolactone  50 mg Oral Daily    [Held by provider] furosemide  40 mg Oral BID    levothyroxine  300 mcg Oral Daily    nicotine  1 patch Transdermal Daily    sertraline  50 mg Oral Daily    simvastatin  40 mg Oral Nightly    sodium chloride flush  10 mL Intravenous 2 times per day     Assessment:  63 yo F admitted 09/15/19 for abdominal swelling and SOB. She has a history of cirrhosis secondary to alcoholism. US paracentesis 09/17/19 with 8L ascites fluid removed, negative for bacterial growth. She has a history of constipation. Lasix increased to 40mg BID. She was hospitalized last month with similar complaints and a total of 16L ascites fluid removed. She is need of outpatient cirrhosis work-up and to get on a routine schedule for paracentesis. 1. Ascites  2. SOB  3. RAMBO on CKD  4. Cirrhosis secondary to alcohol abuse  5. HTN  6. COPD  7. DM  8. Thrombocytopenia  9. Anemia, stable       Plan:    1. US guided paracentesis, albumin to be administered post procedure with recommendation of 25% 50mL for every 5 liters removed  2. Lasix 40mg BID when okay with nephrology  3. Aldactone 100mg daily when okay with nephrology  4. Low sodium, carb count diet  5. Miralax BID  6. Colace BID  7. MOM prn  8.  Discussed the importance of medication and outpatient follow-up compliance with patient, verbalized understanding for management & further work-up of cirrhosis as well as establishing a paracentesis schedule   Hopefully she will be discharged in time for her to keep her appointment in the office on 09/25/19  Will follow    Case

## 2019-09-18 NOTE — PROGRESS NOTES
Hospitalist Progress Note    Patient:  Vito Mohr      Unit/Bed:6K-12/012-A    YOB: 1960    MRN: [id number]       Acct: [de-identified]     PCP: Anant Villarreal PA-C    Date of Admission: 9/15/2019    Assessment/Plan:    Cirrhosis of liver, alcoholic, ascites decompensated with ascites, very tense:   Had abdominal paracentesis ultrasound-guided on 9/16, 8 L removed, albumin 25 g transfused   on lasix, aldactone, lactulose TID  9/18-Has tense ascites again-will get paracentesis and also check CT of the abdomen as the fluid is acute relating more in the upper quadrant, suspicion for loculation, hold diuretics if renal function worsening    Acute kidney injury on chronic kidney disease stage III: Patient's baseline creatinine appears to be from 1.3-1.6  -1.9 on admission, trended down to 1.7, on diuretics, will consult nephrology to adjust diuretics  Patient's Lasix increased to 40 mg twice daily-and continued on Aldactone  9/18-appreciate nephrology input, creatinine worsened to 2.2, will hold diuretics until nephrology evaluates again today    Hyperkalemia secondary to renal failure: 5.4 on admission, improved will need to take levothyroxine on empty stomach    Current smoker: Counseling done     Esophageal varices on EGD done on 8/23/2019 : start pepcid 20 mg daily  Portal gastropathy    Pancytopenia secondary to cirrhosis: Hemoglobin monitor, stable    Severe hypothyroidism, continue levothyroxine:  Lab Results   Component Value Date    .600 (H) 08/23/2019         Diabetes mellitus type 2, CKD stage III, not requiring insulin:  Patient's Amaryl has been held, blood sugars in 300s, on sliding scale  Lab Results   Component Value Date    LABA1C 9.2 (H) 08/21/2019     Lab Results   Component Value Date     06/18/2019         Patient denies having hypertension    Hyperlipidemia: Simvastatin    COPD without exacerbation: Bronchodilators as needed    Morbid obesity Body mass index again, check CT scan of the abdomen to see for loculation as the ascites is more in the upper quadrant rather than diffuse, creatinine worsened to 2.2, hold Lasix and Aldactone until nephrologist reevaluates again today. Blood sugars still high, started Lantus 15 units yesterday    Subjective (past 24 hours): Patient is still having discomfort in the abdomen from being distended and tense, no chest pain or shortness of breath and having bowel movements,      Medications:  Reviewed    Infusion Medications    dextrose       Scheduled Medications    albumin human  50 mL Intravenous Once    insulin lispro  0-12 Units Subcutaneous TID WC    insulin lispro  0-6 Units Subcutaneous Nightly    influenza virus vaccine  0.5 mL Intramuscular Once    famotidine  20 mg Oral Daily    insulin glargine  15 Units Subcutaneous Daily    polyethylene glycol  17 g Oral BID    docusate sodium  100 mg Oral BID    [Held by provider] spironolactone  50 mg Oral Daily    [Held by provider] furosemide  40 mg Oral BID    levothyroxine  300 mcg Oral Daily    nicotine  1 patch Transdermal Daily    sertraline  50 mg Oral Daily    simvastatin  40 mg Oral Nightly    sodium chloride flush  10 mL Intravenous 2 times per day     PRN Meds: glucose, dextrose, glucagon (rDNA), dextrose, ondansetron, magnesium hydroxide, ondansetron, potassium chloride **OR** potassium alternative oral replacement **OR** potassium chloride, sodium chloride flush      Intake/Output Summary (Last 24 hours) at 9/18/2019 0939  Last data filed at 9/18/2019 0550  Gross per 24 hour   Intake 900 ml   Output 1510 ml   Net -610 ml       Diet:  DIET GENERAL; Carb Control: 4 carb choices (60 gms)/meal; No Added Salt (3-4 GM);  Daily Fluid Restriction: 1800 ml    Exam:  BP (!) 98/53   Pulse 70   Temp 98.1 °F (36.7 °C) (Oral)   Resp 16   Ht 5' 4\" (1.626 m)   Wt 233 lb 11.2 oz (106 kg)   SpO2 99%   BMI 40.11 kg/m²     Physical Examination:   General appearance -

## 2019-09-18 NOTE — PROGRESS NOTES
[facility]         Renal Inpatient Follow-Up note         9/18/2019 12:25 PM    Pt Name:   Miya Starr  YOB: 1960  Attending:   Kiko Clifford MD    Chief Complaint : Miya Starr is a 62 y.o. female being followed by nephrology for NETTA OWASSO /CKD    Interval History :   Patient seen and examined by me. No distress  Feels ok. denies any CP. Some SOB and abd distention. Had paracentesis 9/16 with 8 L fluid removal.  Due for paracentesis again today     Scheduled Medications :    albumin human  50 mL Intravenous Once    insulin lispro  0-12 Units Subcutaneous TID WC    insulin lispro  0-6 Units Subcutaneous Nightly    influenza virus vaccine  0.5 mL Intramuscular Once    famotidine  20 mg Oral Daily    insulin glargine  15 Units Subcutaneous Daily    polyethylene glycol  17 g Oral BID    docusate sodium  100 mg Oral BID    [Held by provider] spironolactone  50 mg Oral Daily    [Held by provider] furosemide  40 mg Oral BID    levothyroxine  300 mcg Oral Daily    nicotine  1 patch Transdermal Daily    sertraline  50 mg Oral Daily    simvastatin  40 mg Oral Nightly    sodium chloride flush  10 mL Intravenous 2 times per day      dextrose         Vitals :  BP (!) 119/58   Pulse 73   Temp 97.5 °F (36.4 °C) (Oral)   Resp 16   Ht 5' 4\" (1.626 m)   Wt 233 lb 11.2 oz (106 kg)   SpO2 94%   BMI 40.11 kg/m²     24HR INTAKE/OUTPUT:      Intake/Output Summary (Last 24 hours) at 9/18/2019 1225  Last data filed at 9/18/2019 0848  Gross per 24 hour   Intake 1080 ml   Output 1510 ml   Net -430 ml     Last 3 weights  Wt Readings from Last 3 Encounters:   09/18/19 233 lb 11.2 oz (106 kg)   08/21/19 268 lb 9.6 oz (121.8 kg)   08/01/19 262 lb 3.2 oz (118.9 kg)           Physical Exam :  General Appearance:  Well developed.  No distress  Mouth/Throat:  Oral mucosa moist  Neck:  Supple, no JVD  Lungs:  Breath sounds: clear  Heart[de-identified]  S1,S2 heard  Abdomen:  Soft, non - tender, distended  Musculoskeletal:  Edema - none         Last 3 CBC   Recent Labs     09/16/19  0552 09/17/19  0601   WBC 5.2 4.1*   RBC 3.44* 3.22*   HGB 11.0* 10.2*   HCT 34.5* 31.9*   PLT 94* 84*     Last 3 CMP  Recent Labs     09/16/19  0552 09/17/19  0601 09/18/19  0527    136 137   K 5.4* 4.7 4.5    102 102   CO2 22* 23 23   BUN 21 21 23*   CREATININE 1.9* 1.7* 2.2*   CALCIUM 8.7 8.4* 8.5   LABALBU 3.2* 2.8*  --    BILITOT 0.5 0.5  --              Assessment / Plan   Renal -acute kidney injury with fluctuating renal function,  -This is most likely due to intravascular volume depletion due to recent paracentesis and use of diuretics  -Abdomen appears to be quite distended, cannot rule out some component of increased intra-abdominal pressure  -However she is having a paracentesis done today, we will follow. Hold all diuretics at this time   -Cannot rule out some component of hepatorenal send urine electrolytes. Electrolytes-appear to be within normal limits  Borderline blood pressures  History of liver cirrhosis with ascites and frequent paracentesis  Chronic kidney disease stage III with baseline creatinine around 1.5  Diabetes mellitus  D/W patient, nurse. meds reviewed    SRINIVASA Smalls D.  [facility].

## 2019-09-18 NOTE — PROGRESS NOTES
her LE joints and her back. She was able to walk a short distance in her room at this time. She did exercises but needed rest breaks between sets. She did not demonstrate doing her self care secondary to fatigue. She reported not having slept well last night and having made multiple trips to the bathroom. Performance deficits / Impairments: Decreased functional mobility , Decreased ADL status, Decreased endurance, Decreased strength  Prognosis: Good  REQUIRES OT FOLLOW UP: Yes  Decision Making: Low Complexity  Safety Devices in place: Yes  Type of devices: Call light within reach, Nurse notified, Left in bed, Bed alarm in place, Patient at risk for falls    Treatment Initiated: Treatment and education initiated within context of evaluation. Evaluation time included review of current medical information, gathering information related to past medical, social and functional history, completion of standardized testing, formal and informal observation of tasks, assessment of data and development of plan of care and goals. Treatment time included skilled education and facilitation of tasks to increase safety and independence with ADL's for improved functional independence and quality of life. Patient completed BUE strengthening exercises x 10 reps x 4 sets this date with a moderate resistance stretch band in all joints and all planes in order to increase strength and improve activity tolerance required for functional toilet and shower transfers and ADL routine. Patient tolerated fair. She took rest breaks between sets. A handout was provided.     Discharge Recommendations:  Continue to assess pending progress, Patient would benefit from continued therapy after discharge, Home with Home health OT    Patient Education:  OT Education: OT Role, Plan of Care, Home Exercise Program  Patient Education: Benefit of following the low sodium diet and watching her intake each day; low impact exercises and benefit of

## 2019-09-19 LAB
ANION GAP SERPL CALCULATED.3IONS-SCNC: 9 MEQ/L (ref 8–16)
BUN BLDV-MCNC: 27 MG/DL (ref 7–22)
CALCIUM SERPL-MCNC: 8.5 MG/DL (ref 8.5–10.5)
CHLORIDE BLD-SCNC: 103 MEQ/L (ref 98–111)
CO2: 27 MEQ/L (ref 23–33)
CREAT SERPL-MCNC: 2.1 MG/DL (ref 0.4–1.2)
GFR SERPL CREATININE-BSD FRML MDRD: 24 ML/MIN/1.73M2
GLUCOSE BLD-MCNC: 248 MG/DL (ref 70–108)
GLUCOSE BLD-MCNC: 274 MG/DL (ref 70–108)
GLUCOSE BLD-MCNC: 277 MG/DL (ref 70–108)
GLUCOSE BLD-MCNC: 302 MG/DL (ref 70–108)
GLUCOSE BLD-MCNC: 389 MG/DL (ref 70–108)
POTASSIUM SERPL-SCNC: 5 MEQ/L (ref 3.5–5.2)
SODIUM BLD-SCNC: 139 MEQ/L (ref 135–145)

## 2019-09-19 PROCEDURE — 6370000000 HC RX 637 (ALT 250 FOR IP): Performed by: INTERNAL MEDICINE

## 2019-09-19 PROCEDURE — 6370000000 HC RX 637 (ALT 250 FOR IP): Performed by: NURSE PRACTITIONER

## 2019-09-19 PROCEDURE — 82948 REAGENT STRIP/BLOOD GLUCOSE: CPT

## 2019-09-19 PROCEDURE — 97116 GAIT TRAINING THERAPY: CPT

## 2019-09-19 PROCEDURE — 6370000000 HC RX 637 (ALT 250 FOR IP): Performed by: FAMILY MEDICINE

## 2019-09-19 PROCEDURE — 2580000003 HC RX 258: Performed by: INTERNAL MEDICINE

## 2019-09-19 PROCEDURE — 1200000000 HC SEMI PRIVATE

## 2019-09-19 PROCEDURE — 99232 SBSQ HOSP IP/OBS MODERATE 35: CPT | Performed by: INTERNAL MEDICINE

## 2019-09-19 PROCEDURE — 36415 COLL VENOUS BLD VENIPUNCTURE: CPT

## 2019-09-19 PROCEDURE — 80048 BASIC METABOLIC PNL TOTAL CA: CPT

## 2019-09-19 RX ORDER — INSULIN GLARGINE 100 [IU]/ML
25 INJECTION, SOLUTION SUBCUTANEOUS DAILY
Status: DISCONTINUED | OUTPATIENT
Start: 2019-09-19 | End: 2019-09-20 | Stop reason: HOSPADM

## 2019-09-19 RX ORDER — SODIUM CHLORIDE 9 MG/ML
INJECTION, SOLUTION INTRAVENOUS CONTINUOUS
Status: DISCONTINUED | OUTPATIENT
Start: 2019-09-19 | End: 2019-09-20 | Stop reason: HOSPADM

## 2019-09-19 RX ADMIN — DOCUSATE SODIUM 100 MG: 100 CAPSULE, LIQUID FILLED ORAL at 08:40

## 2019-09-19 RX ADMIN — POLYETHYLENE GLYCOL 3350 17 G: 17 POWDER, FOR SOLUTION ORAL at 20:15

## 2019-09-19 RX ADMIN — INSULIN LISPRO 3 UNITS: 100 INJECTION, SOLUTION INTRAVENOUS; SUBCUTANEOUS at 20:15

## 2019-09-19 RX ADMIN — POLYETHYLENE GLYCOL 3350 17 G: 17 POWDER, FOR SOLUTION ORAL at 08:40

## 2019-09-19 RX ADMIN — INSULIN LISPRO 10 UNITS: 100 INJECTION, SOLUTION INTRAVENOUS; SUBCUTANEOUS at 11:24

## 2019-09-19 RX ADMIN — Medication 10 ML: at 08:40

## 2019-09-19 RX ADMIN — SERTRALINE 50 MG: 50 TABLET, FILM COATED ORAL at 08:39

## 2019-09-19 RX ADMIN — INSULIN LISPRO 6 UNITS: 100 INJECTION, SOLUTION INTRAVENOUS; SUBCUTANEOUS at 08:40

## 2019-09-19 RX ADMIN — INSULIN GLARGINE 25 UNITS: 100 INJECTION, SOLUTION SUBCUTANEOUS at 11:24

## 2019-09-19 RX ADMIN — FAMOTIDINE 20 MG: 20 TABLET ORAL at 08:40

## 2019-09-19 RX ADMIN — INSULIN LISPRO 8 UNITS: 100 INJECTION, SOLUTION INTRAVENOUS; SUBCUTANEOUS at 16:46

## 2019-09-19 RX ADMIN — LEVOTHYROXINE SODIUM 300 MCG: 150 TABLET ORAL at 05:28

## 2019-09-19 RX ADMIN — DOCUSATE SODIUM 100 MG: 100 CAPSULE, LIQUID FILLED ORAL at 20:15

## 2019-09-19 RX ADMIN — SIMVASTATIN 40 MG: 40 TABLET, FILM COATED ORAL at 20:15

## 2019-09-19 RX ADMIN — SODIUM CHLORIDE: 9 INJECTION, SOLUTION INTRAVENOUS at 15:11

## 2019-09-19 ASSESSMENT — PAIN DESCRIPTION - LOCATION
LOCATION: ABDOMEN
LOCATION: ABDOMEN

## 2019-09-19 ASSESSMENT — PAIN DESCRIPTION - ONSET
ONSET: ON-GOING
ONSET: ON-GOING

## 2019-09-19 ASSESSMENT — PAIN DESCRIPTION - PAIN TYPE
TYPE: ACUTE PAIN
TYPE: ACUTE PAIN

## 2019-09-19 ASSESSMENT — PAIN DESCRIPTION - FREQUENCY
FREQUENCY: CONTINUOUS
FREQUENCY: CONTINUOUS

## 2019-09-19 ASSESSMENT — PAIN DESCRIPTION - DESCRIPTORS
DESCRIPTORS: ACHING;DULL
DESCRIPTORS: ACHING;DULL

## 2019-09-19 ASSESSMENT — PAIN SCALES - GENERAL
PAINLEVEL_OUTOF10: 0
PAINLEVEL_OUTOF10: 1
PAINLEVEL_OUTOF10: 3
PAINLEVEL_OUTOF10: 0

## 2019-09-19 ASSESSMENT — PAIN DESCRIPTION - ORIENTATION
ORIENTATION: MID
ORIENTATION: MID;UPPER

## 2019-09-19 ASSESSMENT — PAIN DESCRIPTION - PROGRESSION: CLINICAL_PROGRESSION: RESOLVED

## 2019-09-19 NOTE — PROGRESS NOTES
SOB  3. RAMBO on CKD  4. Cirrhosis secondary to alcohol abuse  5. HTN  6. COPD  7. DM  8. Thrombocytopenia  9. Anemia, stable  10. Constipation       Plan:    1. Lasix 40mg BID when okay with nephrology  2. Aldactone 100mg daily when okay with nephrology  3. Low sodium, carb count diet  4. Miralax BID  5. Colace BID  6. MOM prn  7.  Discussed the importance of medication and outpatient follow-up compliance with patient, verbalized understanding for management & further work-up of cirrhosis as well as establishing a paracentesis schedule   Hopefully she will be discharged in time for her to keep her appointment in the office on 09/25/19  GI signing off    Case reviewed and impression/plan reviewed in collaboration with Dr. Anjel Alvarez  Electronically signed by JW Garcia CNP on 9/19/2019 at 9:47 AM    [facility]

## 2019-09-19 NOTE — PLAN OF CARE
Problem: Pain:  Goal: Pain level will decrease  Description  Pain level will decrease  9/18/2019 2308 by Kita Leggett RN  Outcome: Ongoing  Note:   Patient free from pain this shift. Pain rated on 0-10 pain rating scale. Will continue to reassess. Pain goal is no pain  9/18/2019 1723 by Elise Ortega RN  Outcome: Ongoing  Note:   Patient stats that her pain is a 4/10 this shift. Pain rated on 0-10 pain rating scale. Patient denied wanting any pain medication for her abdominal pain. Patient was able to reposition to help with her abdominal pain till per paracentesis. Patient states that she has no pain since her paracentesis. Will continue to reassess. Problem: Falls - Risk of:  Goal: Will remain free from falls  Description  Will remain free from falls  9/18/2019 2308 by Kita Leggett RN  Outcome: Ongoing  Note:   Call light within reach. Side rails up x2. Bed alarm on. Non skid slippers available. 9/18/2019 1723 by Elise Ortega RN  Outcome: Ongoing  Note:   No falls noted this shift. Patient ambulates with x1 staff assistance without difficulty. Family member at bedside, spent the night. Bed kept in low position. Safe environment maintained. Bedside table & call light in reach. Uses call light appropriately when needing assistance. Goal: Absence of physical injury  Description  Absence of physical injury  Outcome: Ongoing  Note:   There has not been any episodes of physical injury at this time in the shift. Problem: Discharge Planning:  Goal: Participates in care planning  Description  Participates in care planning  9/18/2019 2308 by Kita Leggett RN  Outcome: Ongoing  Note:   Patient plans to be discharged to home with [place] when medically stable. 9/18/2019 1723 by Elise Ortega RN  Outcome: Ongoing  Note:   Patient plans to be discharged to home with her family and [place] when medically stable.       Problem: Skin Integrity - Impaired:  Goal: Absence of new skin

## 2019-09-19 NOTE — PROGRESS NOTES
Hospitalist Progress Note    Patient:  [facility]      Unit/Bed:6K-12/012-A    YOB: 1960    MRN: [id number]       Acct: [de-identified]     PCP: Brina Ricci PA-C    Date of Admission: 9/15/2019    Assessment/Plan:    Cirrhosis of liver, alcoholic, ascites decompensated with ascites, very tense:   Had abdominal paracentesis ultrasound-guided on 9/16, 8 L removed, albumin 25 g transfused   on lasix, aldactone, lactulose changed to miralx   9/18-Has tense ascites again-will get paracentesis and also check CT of the abdomen as the fluid is acute relating more in the upper quadrant, suspicion for loculation, hold diuretics if renal function worsening  9/19-CT scan abdomen reviewed, shows cirrhosis, anasarca and ascites,??  Not sure if hernia present but appears like one on CT scan  -2.1 L drained yesterday, still very distended, apparently had 3 bowel movements, on stool softeners   -    Acute kidney injury on chronic kidney disease stage III: Patient's baseline creatinine appears to be from 1.3-1.6  -1.9 on admission, trended down to 1.7, on diuretics, will consult nephrology to adjust diuretics  Patient's Lasix increased to 40 mg twice daily-and continued on Aldactone  9/18-appreciate nephrology input, creatinine worsened to 2.2, will hold diuretics until nephrology evaluates again today  9/19, creatinine 2.1, continue to hold diuretics    Hyperkalemia secondary to renal failure: 5.4 on admission, improved     Current smoker: Counseling done     Esophageal varices on EGD done on 8/23/2019 : start pepcid 20 mg daily  Portal gastropathy    Pancytopenia secondary to cirrhosis: Hemoglobin monitor, stable    Severe hypothyroidism, continue levothyroxine:  Lab Results   Component Value Date    .600 (H) 08/23/2019     Counseled and need to take levothyroxine in empty stomach    Diabetes mellitus type 2, CKD stage III, not requiring insulin:  Patient's Amaryl has been held, blood sugars in

## 2019-09-19 NOTE — PROGRESS NOTES
[facility]  INPATIENT PHYSICAL THERAPY  DAILY NOTE  [facility] RENAL TELEMETRY 6K - 6K-12/012-A  Time In: 1440  Time Out: 1450  Timed Code Treatment Minutes: 10 Minutes  Minutes: 10          Date: 2019  Patient Name: Renato Stoddard,  Gender:  female        MRN: [id number]  : 1960  (62 y.o.)     Referring Practitioner: Anders Thornton MD  Diagnosis: Ascites  Additional Pertinent Hx: 62 y.o. female  With hx of alcoholic cirrhosis complicated by recurrent ascites, esophageal varices, ckd 3, gastropathy, depression and Morbid obesity  with who presented to [facility] with the above complain. Patient was hospitalized here last month with similar complain and had about 16L fluid removal. She was discharged on lasix/aldactone and mention progressive fluid re accumulation resulting to SOB in the last two days. This morning, patient felt increased nausea and SOB such that she couldn't accomplish her ADLS. She was seen at [facility] ER where preliminary lab test showed mild RAMBO on CKD. She also has significant ascites requiring repeat paracentesis that was advised to be done on in patient setting. She denies fever chills, confusion, diarrhea or change in urine color. s/p paracentesis .      Prior Level of Function:  Lives With: Family(Daughter and her family; son and his family are also on the property)  Type of Home: House  Home Layout: Two level, Able to Live on Main level with bedroom/bathroom  Home Access: Stairs to enter without rails  Entrance Stairs - Number of Steps: 1  Home Equipment: Rolling walker, 4 wheeled walker, Wheelchair-manual   Bathroom Shower/Tub: Tub/Shower unit  Bathroom Toilet: Standard  Bathroom Equipment: Commode  Bathroom Accessibility: Accessible    Receives Help From: Family  ADL Assistance: (She hikes up her leg while at the edge of bed to reach her feet and don the socks)  Homemaking Assistance: Needs assistance  Homemaking Responsibilities: Yes  Ambulation

## 2019-09-19 NOTE — PROGRESS NOTES
[address] [address]  PHYSICAL THERAPY MISSED TREATMENT NOTE  ACUTE CARE  [facility] RENAL TELEMETRY 6K          x1- Doctor in with patient     Missed Treatment  Shivani Michaels PTA [id number]

## 2019-09-19 NOTE — PROGRESS NOTES
[facility]         Renal Inpatient Follow-Up note         9/19/2019 1:35 PM    Pt Name:   Dionne Weiner  YOB: 1960  Attending:   Drake Boswell MD    Chief Complaint : Dionne Weiner is a 62 y.o. female being followed by nephrology for RAMBO /CKD    Interval History :   Patient seen and examined by me. No distress  Feels ok. denies any CP. Some SOB and no abd pain  Had paracentesis 9/16 with 8 L fluid removal. And 9/18 with 3 L removal      Scheduled Medications :    insulin glargine  25 Units Subcutaneous Daily    insulin lispro  0-12 Units Subcutaneous TID WC    insulin lispro  0-6 Units Subcutaneous Nightly    influenza virus vaccine  0.5 mL Intramuscular Once    famotidine  20 mg Oral Daily    polyethylene glycol  17 g Oral BID    docusate sodium  100 mg Oral BID    [Held by provider] spironolactone  50 mg Oral Daily    [Held by provider] furosemide  40 mg Oral BID    levothyroxine  300 mcg Oral Daily    nicotine  1 patch Transdermal Daily    sertraline  50 mg Oral Daily    simvastatin  40 mg Oral Nightly    sodium chloride flush  10 mL Intravenous 2 times per day      dextrose         Vitals :  BP (!) 101/58   Pulse 71   Temp 98.4 °F (36.9 °C) (Oral)   Resp 18   Ht 5' 4\" (1.626 m)   Wt 229 lb 12.8 oz (104.2 kg)   SpO2 96%   BMI 39.45 kg/m²     24HR INTAKE/OUTPUT:      Intake/Output Summary (Last 24 hours) at 9/19/2019 1335  Last data filed at 9/18/2019 1632  Gross per 24 hour   Intake 120 ml   Output 0 ml   Net 120 ml     Last 3 weights  Wt Readings from Last 3 Encounters:   09/19/19 229 lb 12.8 oz (104.2 kg)   08/21/19 268 lb 9.6 oz (121.8 kg)   08/01/19 262 lb 3.2 oz (118.9 kg)           Physical Exam :  General Appearance:  Well developed.  No distress  Mouth/Throat:  Oral mucosa moist  Neck:  Supple, no JVD  Lungs:  Breath sounds: clear  Heart[de-identified]  S1,S2 heard  Abdomen:  Soft, non - tender, distended  Musculoskeletal:  Edema - none Last 3 CBC   Recent Labs     09/17/19  0601   WBC 4.1*   RBC 3.22*   HGB 10.2*   HCT 31.9*   PLT 84*     Last 3 CMP  Recent Labs     09/17/19  0601 09/18/19  0527 09/19/19  0614    137 139   K 4.7 4.5 5.0    102 103   CO2 23 23 27   BUN 21 23* 27*   CREATININE 1.7* 2.2* 2.1*   CALCIUM 8.4* 8.5 8.5   LABALBU 2.8*  --   --    BILITOT 0.5  --   --              Assessment / Plan   Renal -acute kidney injury with fluctuating renal function,  -This is most likely due to intravascular volume depletion due to recent paracentesis and use of diuretics  -Abdomen appears to be feeling better  S/P paracentesis  - Hold all diuretics at this time   -Cannot rule out some component of hepatorenal send urine electrolytes. - give a trial of IVF and BMp in AM    Electrolytes-appear to be within normal limits  Borderline blood pressures  History of liver cirrhosis with ascites and frequent paracentesis  Chronic kidney disease stage III with baseline creatinine around 1.5  Diabetes mellitus  D/W patient, nurse. meds reviewed    SRINIVASA Ayala D.  [facility].

## 2019-09-19 NOTE — PROGRESS NOTES
Patient is alert and oriented x4 and breathing is unlabored. Head to toe assessment complete. 2+ edema in lower extremities bilaterally. Patient is resting in bed with call light and tray table within reach. Side rails are up x2 and bed alarm is on. Patient states pain is a 3 out of 10 in the mid abdomen and describes it as dull and achy. Reposition offered. Elsi RODRIGUEZ/KATARZYNA.

## 2019-09-20 VITALS
HEART RATE: 69 BPM | BODY MASS INDEX: 39.47 KG/M2 | DIASTOLIC BLOOD PRESSURE: 62 MMHG | TEMPERATURE: 98 F | OXYGEN SATURATION: 96 % | SYSTOLIC BLOOD PRESSURE: 112 MMHG | HEIGHT: 64 IN | RESPIRATION RATE: 16 BRPM | WEIGHT: 231.2 LBS

## 2019-09-20 LAB
ANION GAP SERPL CALCULATED.3IONS-SCNC: 9 MEQ/L (ref 8–16)
BUN BLDV-MCNC: 27 MG/DL (ref 7–22)
CALCIUM SERPL-MCNC: 8.3 MG/DL (ref 8.5–10.5)
CHLORIDE BLD-SCNC: 105 MEQ/L (ref 98–111)
CO2: 23 MEQ/L (ref 23–33)
CREAT SERPL-MCNC: 2 MG/DL (ref 0.4–1.2)
GFR SERPL CREATININE-BSD FRML MDRD: 25 ML/MIN/1.73M2
GLUCOSE BLD-MCNC: 233 MG/DL (ref 70–108)
GLUCOSE BLD-MCNC: 251 MG/DL (ref 70–108)
GLUCOSE BLD-MCNC: 327 MG/DL (ref 70–108)
GLUCOSE BLD-MCNC: 416 MG/DL (ref 70–108)
POTASSIUM SERPL-SCNC: 4.8 MEQ/L (ref 3.5–5.2)
SODIUM BLD-SCNC: 137 MEQ/L (ref 135–145)

## 2019-09-20 PROCEDURE — 82948 REAGENT STRIP/BLOOD GLUCOSE: CPT

## 2019-09-20 PROCEDURE — 99239 HOSP IP/OBS DSCHRG MGMT >30: CPT | Performed by: INTERNAL MEDICINE

## 2019-09-20 PROCEDURE — 6370000000 HC RX 637 (ALT 250 FOR IP): Performed by: FAMILY MEDICINE

## 2019-09-20 PROCEDURE — 99232 SBSQ HOSP IP/OBS MODERATE 35: CPT | Performed by: INTERNAL MEDICINE

## 2019-09-20 PROCEDURE — 6370000000 HC RX 637 (ALT 250 FOR IP): Performed by: INTERNAL MEDICINE

## 2019-09-20 PROCEDURE — 97535 SELF CARE MNGMENT TRAINING: CPT

## 2019-09-20 PROCEDURE — 97110 THERAPEUTIC EXERCISES: CPT

## 2019-09-20 PROCEDURE — 94760 N-INVAS EAR/PLS OXIMETRY 1: CPT

## 2019-09-20 PROCEDURE — 36415 COLL VENOUS BLD VENIPUNCTURE: CPT

## 2019-09-20 PROCEDURE — 80048 BASIC METABOLIC PNL TOTAL CA: CPT

## 2019-09-20 RX ORDER — LEVOTHYROXINE SODIUM 0.15 MG/1
300 TABLET ORAL DAILY
Qty: 30 TABLET | Refills: 3 | Status: ON HOLD
Start: 2019-09-20 | End: 2019-10-04

## 2019-09-20 RX ORDER — POLYETHYLENE GLYCOL 3350 17 G/17G
17 POWDER, FOR SOLUTION ORAL DAILY
Status: DISCONTINUED | OUTPATIENT
Start: 2019-09-20 | End: 2019-09-20 | Stop reason: HOSPADM

## 2019-09-20 RX ORDER — LANCETS 30 GAUGE
EACH MISCELLANEOUS
Qty: 100 EACH | Refills: 3 | Status: SHIPPED | OUTPATIENT
Start: 2019-09-20

## 2019-09-20 RX ORDER — MIDODRINE HYDROCHLORIDE 5 MG/1
5 TABLET ORAL
Qty: 90 TABLET | Refills: 0 | Status: ON HOLD | OUTPATIENT
Start: 2019-09-20 | End: 2019-10-22

## 2019-09-20 RX ORDER — FUROSEMIDE 40 MG/1
20 TABLET ORAL DAILY
Qty: 30 TABLET | Refills: 5 | Status: ON HOLD
Start: 2019-09-20 | End: 2019-10-25 | Stop reason: SDUPTHER

## 2019-09-20 RX ORDER — POLYETHYLENE GLYCOL 3350 17 G/17G
17 POWDER, FOR SOLUTION ORAL DAILY
Qty: 527 G | Refills: 1 | Status: ON HOLD | OUTPATIENT
Start: 2019-09-21 | End: 2019-10-22 | Stop reason: ALTCHOICE

## 2019-09-20 RX ADMIN — SERTRALINE 50 MG: 50 TABLET, FILM COATED ORAL at 11:54

## 2019-09-20 RX ADMIN — FAMOTIDINE 20 MG: 20 TABLET ORAL at 11:51

## 2019-09-20 RX ADMIN — LEVOTHYROXINE SODIUM 300 MCG: 150 TABLET ORAL at 06:00

## 2019-09-20 RX ADMIN — INSULIN LISPRO 12 UNITS: 100 INJECTION, SOLUTION INTRAVENOUS; SUBCUTANEOUS at 11:51

## 2019-09-20 RX ADMIN — INSULIN GLARGINE 25 UNITS: 100 INJECTION, SOLUTION SUBCUTANEOUS at 09:26

## 2019-09-20 RX ADMIN — INSULIN LISPRO 8 UNITS: 100 INJECTION, SOLUTION INTRAVENOUS; SUBCUTANEOUS at 17:29

## 2019-09-20 RX ADMIN — INSULIN LISPRO 4 UNITS: 100 INJECTION, SOLUTION INTRAVENOUS; SUBCUTANEOUS at 09:24

## 2019-09-20 ASSESSMENT — PAIN SCALES - GENERAL: PAINLEVEL_OUTOF10: 0

## 2019-09-20 NOTE — PROGRESS NOTES
Discharge teaching and instructions for diagnosis/procedure of ascites completed with patient using teachback method. AVS reviewed. Printed prescriptions given to patient. Patient voiced understanding regarding prescriptions, follow up appointments, and care of self at home. Discharged in a wheelchair to  home with support per [place] [employer] [place]. Pt left in stable condition. AVS faxed to [address].

## 2019-09-20 NOTE — PROGRESS NOTES
[address] RENAL TELEMETRY 6K  Occupational Therapy  Daily Note  Time:    Time In:   Time Out:   Timed Code Treatment Minutes: 47 Minutes  Minutes: 54          Date: 2019  Patient Name: Job Angela,   Gender: female      Room: [facility]12/012-A  MRN: [id number]  : 1960  (62 y.o.)  Referring Practitioner: Dr. Ceci Medina MD  Diagnosis: Ascites  Additional Pertinent Hx: Pt presented to [facility] with the above complain of nausea and SOB. Patient was hospitalized here last month with similar complain and had about 16L fluid removal. She was discharged on lasix/aldactone and mention progressive fluid re accumulation resulting to SOB in the last two days. This morning, patient felt increased nausea and SOB such that she couldn't accomplish her ADLS. She was seen at [facility] ER where preliminary lab test showed mild RAMBO on CKD. She also has significant ascites requiring repeat paracentesis that was advised to be done on in patient setting. She denies fever chills, confusion, diarrhea or change in urine color. She is s/p paracentesis . Restrictions/Precautions:  Restrictions/Precautions: Fall Risk    SUBJECTIVE: Pt lying in bed and agreeable to OT session and compelting ADL tasks. Ambrose Prakash RN approving pt for shower this date    PAIN: 0/10:      COGNITION: WNL    ADL:   Grooming: Stand By Assistance. at sink  [address] showering with CGA during standing when washing buttocks  Upper Extremity Dressing: Minimal Assistance. donning gown  Lower Extremity Dressing: Maximum Assistance. donning depends and socks  Toileting: Contact Guard Assistance. Toilet Transfer: Contact Guard Assistance. Shower Transfer: [address]    .  Pt educated on safety awareness during showering tasks especially when standing    BALANCE:  Sitting Balance:  Stand By Assistance  Standing Balance: Contact Guard Assistance    BED MOBILITY:  Supine BUE HEP while following the handout with min cues for steady breathing throughout to increase her endurance and strength for ease of doing ADLs and going shopping. Long term goals  Time Frame for Long term goals : None secondary to short estiimated length of stay. Following session, patient left in safe position with all fall risk precautions in place.

## 2019-09-20 NOTE — DISCHARGE SUMMARY
tablet  Take 2 tablets by mouth Daily To give on empty stomach 45 min before eating on empty stomach, only with water, no other medications or juice             midodrine (PROAMATINE) 5 MG tablet  Take 1 tablet by mouth 3 times daily (with meals)             ondansetron (ZOFRAN ODT) 4 MG disintegrating tablet  Take 1 tablet by mouth every 8 hours as needed for Nausea or Vomiting             polyethylene glycol (GLYCOLAX) packet  Take 17 g by mouth daily             sertraline (ZOLOFT) 50 MG tablet  Take 50 mg by mouth daily             simvastatin (ZOCOR) 40 MG tablet  Take 40 mg by mouth nightly             sucralfate (CARAFATE) 1 GM tablet  Take 1 tablet by mouth 4 times daily (before meals and nightly)                 Time Spent on discharge is more than 35 min in the examination, evaluation, counseling and review of medications and discharge plan. Signed: Thank you Antonia Hodgkin, PA-C for the opportunity to be involved in this patient's care.     Electronically signed by Abdulaziz Montgomery MD on 9/20/2019 at 4:28 PM

## 2019-09-20 NOTE — PROGRESS NOTES
Date    .600 (H) 08/23/2019     Counseled and need to take levothyroxine in empty stomach    Diabetes mellitus type 2, CKD stage III, not requiring insulin:  Patient's Amaryl has been held, blood sugars in 300s, on sliding scale  Lab Results   Component Value Date    LABA1C 9.2 (H) 08/21/2019     Lab Results   Component Value Date     06/18/2019   Discussed that patient will probably need insulins and she is agreeable, increase Lantus to 25 units and sliding scale      Patient denies having hypertension    Hyperlipidemia: Simvastatin    COPD without exacerbation: Bronchodilators as needed    Gallstones incidentally noted      Morbid obesity Body mass index is 39.69 kg/m². History of alcohol abuse, quit drinking 25 years ago        Expected discharge date:  2-3 days    Disposition:    [x] Home       [] TCU       [] Rehab       [] Psych       [] SNF       [] [place]       [] Other-    Chief Complaint: Shortness of breath, abdominal distention    Patient was transferred from outside facility emergency room for further evaluation and treatment. Over the last 1 to 2 weeks patient has been gaining weight and abdomen has been getting more distended and has been having shortness of breath that has been gradually worsening. Patient has been taking her diuretics, Lasix 40 mg daily and Aldactone 50 mg daily. Patient does not drink much water but admits to drinking at least 40 to ounces of tea60 on a daily basis. Midst to smoking cigarettes, cut them down to almost 5 cigarettes/day, has occasional cough, no chest pain or palpitations. Having regular bowel movements. Patient was recently here in hospital from 8/21-8/29, almost for similar complaint and had abdominal paracentesis on 8/22, 8/25 and 8/28 and had 5 L, 5.75 L, 5.38 L removed. Was also treated for RAMBO and had EGD done , for melena and was noticed to have medium sized varices with portal gastropathy.     Hospital Course: Patient admitted to the hospital and gastroenterologist was consulted and had ultrasound-guided paracentesis and 8 L were removed on 9/16/2019. Mild improvement in creatinine from 1.9-1.7 but patient still has edema in the legs, ascites, Lasix has been increased to 40 mg twice daily and continued on Aldactone, lactulose. Patient received albumin 25 g 1 dose. Patient still has very distended abdomen, tense ascites, will consult nephrology, to assist with diuretics. Patient's blood sugars high, diet change to diabetic diet, with fluid restriction 1800 mL, start Lantus while in hospital. Get PT and OT    9/18-abdomen still distended, will arrange for abdominal paracentesis again, check CT scan of the abdomen to see for loculation as the ascites is more in the upper quadrant rather than diffuse, creatinine worsened to 2.2, hold Lasix and Aldactone until nephrologist reevaluates again today.   Blood sugars still high, started Lantus 15 units yesterday    9/19-reviewed CT abdomen, anasarca noted with cirrhosis and ascites, 2.1 L drained, CT scan done after abdominal paracentesis and still had fluid, not sure if hernia also present, abdomen still very distended and tender mostly in the upper quadrants, has a lot of stool on the CT scan, with MiraLAX and bowel regimen, had 3 bowel movements yesterday, still feeling tired, counseled about need to take levothyroxine on empty stomach, blood sugar still high, discussed about needing insulins and patient is agreeable, increase Lantus to 25 units and sliding scale insulin coverage, continue to hold diuretics, creatinine trended down to 2.1    9/20- patient had 2 bowel movements in the morning and 3 at night, soft and lose, but not watery, decrease MiraLAX to once daily, creatinine mild improvement with hydration to 2, abdomen still distended, did not get abdominal binder, patient looking forward to going home, nephrology planning to decrease diuretics on discharge, most likely ascites. **This report has been created using voice recognition software. It may contain minor errors which are inherent in voice recognition technology. **      Final report electronically signed by Dr. Phyllis López on 9/18/2019 4:08 PM      [address] [employer]   Final Result      Ultrasound-guided paracentesis. Approximately, 8.0 L of fluid drained. **This report has been created using voice recognition software. It may contain minor errors which are inherent in voice recognition technology. **            Final report electronically signed by Dr. Lj Nur on 9/16/2019 3:12 PM          DVT prophylaxis: [] Lovenox                                 [x] SCDs                                 [] SQ Heparin                                 [] Encourage ambulation           [] Already on Anticoagulation     Code Status: Full Code    PT/OT Eval Status:  ordered    Tele:   [x] yes             [] no    Active Hospital Problems    Diagnosis Date Noted    Decompensation of cirrhosis of liver ([facility] [K72.90]     Acute kidney injury superimposed on chronic kidney disease ([facility] [N17.9, N18.9]     Ascites [R18.8] 08/21/2019    Current smoker [F17.200] 05/16/2019    Type 2 diabetes mellitus with hyperglycemia, without long-term current use of insulin ([facility] [E11.65] 05/16/2019       Electronically signed by Thalia Langford MD on 9/20/2019 at 9:08 AM

## 2019-09-20 NOTE — PROGRESS NOTES
CLINICAL PHARMACY: DISCHARGE MED RECONCILIATION/REVIEW    [facility] ([facility] Select Patient?: No  Total # of Interventions Recommended: 2 - New Order #: 1  - Updated Order #: 1   -   Total # Interventions Accepted: 2  Intervention Severity:   - Level 1 Intervention Present?: No   - Level 2 #: 1   - Level 3 #: 1   Time Spent (min): 805 [facility] PharmD 9/20/2019 3:43 PM

## 2019-09-20 NOTE — PLAN OF CARE
Problem: Pain:  Goal: Pain level will decrease  Description  Pain level will decrease  9/19/2019 2158 by Muriel Lester RN  Outcome: Ongoing  Note:   Patient free from pain this shift. Pain rated on 0-10 pain rating scale. Will continue to reassess. Pain goal is no pain    9/19/2019 1329 by Lena Cobos RN  Outcome: Ongoing  Note:   Patient free from pain this shift. Pain rated on 0-10 pain rating scale. Will continue to reassess. Problem: Falls - Risk of:  Goal: Will remain free from falls  Description  Will remain free from falls  9/19/2019 2158 by Muriel Lester RN  Outcome: Ongoing  Note:   Call light within reach. Side rails up x2. Bed alarm on. Non skid slippers available. 9/19/2019 1329 by Lena Cobos RN  Outcome: Ongoing  Note:   No falls noted this shift. Patient ambulates with x1 staff assistance without difficulty. Family member at bedside, spent the night. Bed kept in low position. Safe environment maintained. Bedside table & call light in reach. Uses call light appropriately when needing assistance. Goal: Absence of physical injury  Description  Absence of physical injury  Outcome: Ongoing  Note:   There has not been any episodes of physical injury at this time in the shift. Problem: Discharge Planning:  Goal: Participates in care planning  Description  Participates in care planning  9/19/2019 2158 by Muriel Lester RN  Outcome: Ongoing  Note:   Patient plans to be discharged to home with family and [facility] when medically stable. 9/19/2019 1329 by Lena Cobos RN  Note:   Patient plans to be discharged home with her family and home health when medically stable. Problem: Skin Integrity - Impaired:  Goal: Absence of new skin breakdown  Description  Absence of new skin breakdown  9/19/2019 2158 by Muriel Lester RN  Outcome: Ongoing  Note:   No evidence of any new skin breakdown at this time in the shift.      9/19/2019 1329 by Lena Cobos RN  Outcome: Ongoing  Note: Patient free from skin breakdown. Patient turns self and makes frequent positional changes. Patient educated in the importance of turning every two hours to decrease her risk of skin breakdown. Patient verbalized understanding. Will continue to monitor. Problem: Fluid Volume - Imbalance:  Goal: Absence of imbalanced fluid volume signs and symptoms  Description  Absence of imbalanced fluid volume signs and symptoms  9/19/2019 2158 by Sonia Ortega RN  Outcome: Ongoing  Note:   I/O last 3 completed shifts: In: 700 [P.O.:700]  Out: 500 [Urine:500]  I/O this shift:  In: 614.6 [P.O.:350; I.V.:264.6]  Out: 400 [Urine:400]    . 9 @ 50    9/19/2019 1329 by Kamini Dos Santos RN  Outcome: Ongoing  Note:   Patient shows no signs of fluid volume overload at this time. Patients lung sounds are clear and diminished. Patients abd is non-distended. Patient has no pitting edema noted. Will continue to monitor closely. Problem: Fluid Volume:  Goal: Hemodynamic stability will improve  Description  Hemodynamic stability will improve  Outcome: Ongoing  Note:   Vitals:    09/19/19 1944   BP: (!) 107/59   Pulse: 77   Resp: 18   Temp: 97.6 °F (36.4 °C)   SpO2: 96%     Denies CP/SOB   Care plan reviewed with patient. Patient verbalize understanding of the plan of care and contribute to goal setting.

## 2019-09-20 NOTE — PROGRESS NOTES
distended  Musculoskeletal:  Edema - none         Last 3 CBC   No results for input(s): WBC, RBC, HGB, HCT, PLT in the last 72 hours. Last 3 CMP  Recent Labs     09/18/19  0527 09/19/19  0614 09/20/19  0527    139 137   K 4.5 5.0 4.8    103 105   CO2 23 27 23   BUN 23* 27* 27*   CREATININE 2.2* 2.1* 2.0*   CALCIUM 8.5 8.5 8.3*             Assessment / Plan   Renal -acute kidney injury with fluctuating renal function,  -This is most likely due to intravascular volume depletion due to recent paracentesis and use of diuretics and hypotension  -Abdomen appears to be feeling better  S/P paracentesis  -Cannot rule out some component of hepatorenal send urine electrolytes. - add midodrine to im prove renal perfusion    Electrolytes-appear to be within normal limits  Borderline blood pressures - add midodrine  History of liver cirrhosis with ascites and frequent paracentesis  Chronic kidney disease stage III with baseline creatinine around 1.5  Diabetes mellitus  D/W patient, nurse. meds reviewed  Renal meds reconciled    SRINIVASA Fung D.  [facility].

## 2019-09-21 LAB
ANAEROBIC CULTURE: NORMAL
BODY FLUID CULTURE, STERILE: NORMAL
GRAM STAIN RESULT: NORMAL

## 2019-10-03 ENCOUNTER — HOSPITAL ENCOUNTER (INPATIENT)
Age: 59
LOS: 3 days | Discharge: HOME HEALTH | DRG: 280 | End: 2019-10-06
Attending: INTERNAL MEDICINE | Admitting: INTERNAL MEDICINE
Payer: MEDICAID

## 2019-10-03 LAB
BASOPHILS # BLD: 0.8 %
BASOPHILS ABSOLUTE: 0 THOU/MM3 (ref 0–0.1)
EOSINOPHIL # BLD: 0.6 %
EOSINOPHILS ABSOLUTE: 0 THOU/MM3 (ref 0–0.4)
ERYTHROCYTE [DISTWIDTH] IN BLOOD BY AUTOMATED COUNT: 14.1 % (ref 11.5–14.5)
ERYTHROCYTE [DISTWIDTH] IN BLOOD BY AUTOMATED COUNT: 51.6 FL (ref 35–45)
HCT VFR BLD CALC: 36.3 % (ref 37–47)
HEMOGLOBIN: 11.5 GM/DL (ref 12–16)
IMMATURE GRANS (ABS): 0.04 THOU/MM3 (ref 0–0.07)
IMMATURE GRANULOCYTES: 0.6 %
INR BLD: 1.19 (ref 0.85–1.13)
LYMPHOCYTES # BLD: 11.6 %
LYMPHOCYTES ABSOLUTE: 0.7 THOU/MM3 (ref 1–4.8)
MCH RBC QN AUTO: 31.4 PG (ref 26–33)
MCHC RBC AUTO-ENTMCNC: 31.7 GM/DL (ref 32.2–35.5)
MCV RBC AUTO: 99.2 FL (ref 81–99)
MONOCYTES # BLD: 10.8 %
MONOCYTES ABSOLUTE: 0.7 THOU/MM3 (ref 0.4–1.3)
NUCLEATED RED BLOOD CELLS: 0 /100 WBC
PLATELET # BLD: 113 THOU/MM3 (ref 130–400)
PMV BLD AUTO: 10 FL (ref 9.4–12.4)
RBC # BLD: 3.66 MILL/MM3 (ref 4.2–5.4)
SEG NEUTROPHILS: 75.6 %
SEGMENTED NEUTROPHILS ABSOLUTE COUNT: 4.7 THOU/MM3 (ref 1.8–7.7)
WBC # BLD: 6.2 THOU/MM3 (ref 4.8–10.8)

## 2019-10-03 PROCEDURE — 36415 COLL VENOUS BLD VENIPUNCTURE: CPT

## 2019-10-03 PROCEDURE — 80053 COMPREHEN METABOLIC PANEL: CPT

## 2019-10-03 PROCEDURE — 85610 PROTHROMBIN TIME: CPT

## 2019-10-03 PROCEDURE — 1200000003 HC TELEMETRY R&B

## 2019-10-03 PROCEDURE — 85025 COMPLETE CBC W/AUTO DIFF WBC: CPT

## 2019-10-03 PROCEDURE — 99222 1ST HOSP IP/OBS MODERATE 55: CPT | Performed by: FAMILY MEDICINE

## 2019-10-03 RX ORDER — ONDANSETRON 4 MG/1
4 TABLET, ORALLY DISINTEGRATING ORAL EVERY 8 HOURS PRN
Status: DISCONTINUED | OUTPATIENT
Start: 2019-10-03 | End: 2019-10-06 | Stop reason: HOSPADM

## 2019-10-03 RX ORDER — NICOTINE POLACRILEX 4 MG
15 LOZENGE BUCCAL PRN
Status: DISCONTINUED | OUTPATIENT
Start: 2019-10-03 | End: 2019-10-06 | Stop reason: HOSPADM

## 2019-10-03 RX ORDER — SIMVASTATIN 40 MG
40 TABLET ORAL NIGHTLY
Status: DISCONTINUED | OUTPATIENT
Start: 2019-10-03 | End: 2019-10-06 | Stop reason: HOSPADM

## 2019-10-03 RX ORDER — SODIUM CHLORIDE 0.9 % (FLUSH) 0.9 %
10 SYRINGE (ML) INJECTION PRN
Status: DISCONTINUED | OUTPATIENT
Start: 2019-10-03 | End: 2019-10-06 | Stop reason: HOSPADM

## 2019-10-03 RX ORDER — DEXTROSE MONOHYDRATE 25 G/50ML
12.5 INJECTION, SOLUTION INTRAVENOUS PRN
Status: DISCONTINUED | OUTPATIENT
Start: 2019-10-03 | End: 2019-10-06 | Stop reason: HOSPADM

## 2019-10-03 RX ORDER — INSULIN GLARGINE 100 [IU]/ML
25 INJECTION, SOLUTION SUBCUTANEOUS NIGHTLY
Status: DISCONTINUED | OUTPATIENT
Start: 2019-10-04 | End: 2019-10-05

## 2019-10-03 RX ORDER — DEXTROSE MONOHYDRATE 50 MG/ML
100 INJECTION, SOLUTION INTRAVENOUS PRN
Status: DISCONTINUED | OUTPATIENT
Start: 2019-10-03 | End: 2019-10-06 | Stop reason: HOSPADM

## 2019-10-03 RX ORDER — LEVOTHYROXINE SODIUM 0.15 MG/1
300 TABLET ORAL DAILY
Status: DISCONTINUED | OUTPATIENT
Start: 2019-10-04 | End: 2019-10-06 | Stop reason: HOSPADM

## 2019-10-03 RX ORDER — ONDANSETRON 2 MG/ML
4 INJECTION INTRAMUSCULAR; INTRAVENOUS EVERY 6 HOURS PRN
Status: DISCONTINUED | OUTPATIENT
Start: 2019-10-03 | End: 2019-10-06 | Stop reason: HOSPADM

## 2019-10-03 RX ORDER — FUROSEMIDE 40 MG/1
40 TABLET ORAL DAILY
Status: DISCONTINUED | OUTPATIENT
Start: 2019-10-03 | End: 2019-10-06 | Stop reason: HOSPADM

## 2019-10-03 RX ORDER — SODIUM CHLORIDE 0.9 % (FLUSH) 0.9 %
10 SYRINGE (ML) INJECTION EVERY 12 HOURS SCHEDULED
Status: DISCONTINUED | OUTPATIENT
Start: 2019-10-03 | End: 2019-10-06 | Stop reason: HOSPADM

## 2019-10-03 RX ORDER — SPIRONOLACTONE 25 MG/1
50 TABLET ORAL DAILY
Status: DISCONTINUED | OUTPATIENT
Start: 2019-10-04 | End: 2019-10-06 | Stop reason: HOSPADM

## 2019-10-03 ASSESSMENT — PAIN DESCRIPTION - DESCRIPTORS: DESCRIPTORS: STABBING

## 2019-10-03 ASSESSMENT — PAIN DESCRIPTION - LOCATION: LOCATION: ABDOMEN

## 2019-10-03 ASSESSMENT — PAIN DESCRIPTION - ORIENTATION: ORIENTATION: MID

## 2019-10-03 ASSESSMENT — PAIN DESCRIPTION - FREQUENCY: FREQUENCY: CONTINUOUS

## 2019-10-03 ASSESSMENT — PAIN SCALES - GENERAL: PAINLEVEL_OUTOF10: 10

## 2019-10-03 ASSESSMENT — PAIN DESCRIPTION - PROGRESSION: CLINICAL_PROGRESSION: GRADUALLY WORSENING

## 2019-10-03 ASSESSMENT — PAIN DESCRIPTION - PAIN TYPE: TYPE: ACUTE PAIN

## 2019-10-03 ASSESSMENT — PAIN DESCRIPTION - ONSET: ONSET: ON-GOING

## 2019-10-03 ASSESSMENT — PAIN - FUNCTIONAL ASSESSMENT: PAIN_FUNCTIONAL_ASSESSMENT: PREVENTS OR INTERFERES SOME ACTIVE ACTIVITIES AND ADLS

## 2019-10-04 ENCOUNTER — APPOINTMENT (OUTPATIENT)
Dept: ULTRASOUND IMAGING | Age: 59
DRG: 280 | End: 2019-10-04
Attending: INTERNAL MEDICINE
Payer: MEDICAID

## 2019-10-04 PROBLEM — R06.02 SHORTNESS OF BREATH: Status: ACTIVE | Noted: 2019-10-04

## 2019-10-04 PROBLEM — D53.9 MACROCYTIC ANEMIA: Status: ACTIVE | Noted: 2019-10-04

## 2019-10-04 PROBLEM — R79.1 ELEVATED INR: Status: ACTIVE | Noted: 2019-10-04

## 2019-10-04 PROBLEM — D69.6 THROMBOCYTOPENIA (HCC): Status: ACTIVE | Noted: 2019-10-04

## 2019-10-04 LAB
ALBUMIN SERPL-MCNC: 3.3 G/DL (ref 3.5–5.1)
ALP BLD-CCNC: 152 U/L (ref 38–126)
ALT SERPL-CCNC: 15 U/L (ref 11–66)
ANION GAP SERPL CALCULATED.3IONS-SCNC: 14 MEQ/L (ref 8–16)
AST SERPL-CCNC: 14 U/L (ref 5–40)
BILIRUB SERPL-MCNC: 0.7 MG/DL (ref 0.3–1.2)
BODY FLUID RBC: < 2000 /CUMM
BUN BLDV-MCNC: 26 MG/DL (ref 7–22)
CALCIUM SERPL-MCNC: 9 MG/DL (ref 8.5–10.5)
CHARACTER, BODY FLUID: CLEAR
CHLORIDE BLD-SCNC: 99 MEQ/L (ref 98–111)
CO2: 22 MEQ/L (ref 23–33)
COLOR: YELLOW
CREAT SERPL-MCNC: 1.8 MG/DL (ref 0.4–1.2)
GFR SERPL CREATININE-BSD FRML MDRD: 29 ML/MIN/1.73M2
GLUCOSE BLD-MCNC: 186 MG/DL (ref 70–108)
GLUCOSE BLD-MCNC: 215 MG/DL (ref 70–108)
GLUCOSE BLD-MCNC: 215 MG/DL (ref 70–108)
GLUCOSE BLD-MCNC: 238 MG/DL (ref 70–108)
GLUCOSE BLD-MCNC: 242 MG/DL (ref 70–108)
GLUCOSE BLD-MCNC: 286 MG/DL (ref 70–108)
LD, FLUID: 65 U/L
MESOTHELIAL CELLS BODY FLUID: NORMAL
MONONUCLEAR CELLS BODY FLUID: 85.5 %
PATHOLOGIST REVIEW: NORMAL
POLYMORPHONUCLEAR CELLS BODY FLUID: 14.5 %
POTASSIUM SERPL-SCNC: 4 MEQ/L (ref 3.5–5.2)
PROTEIN FLUID: 2 GM/DL
SODIUM BLD-SCNC: 135 MEQ/L (ref 135–145)
SPECIMEN: NORMAL
TOTAL NUCLEATED CELLS BODY FLUID: 132 /CUMM (ref 0–500)
TOTAL PROTEIN: 7 G/DL (ref 6.1–8)
TOTAL VOLUME RECEIVED BODY FLUID: 80 ML

## 2019-10-04 PROCEDURE — 87205 SMEAR GRAM STAIN: CPT

## 2019-10-04 PROCEDURE — 82948 REAGENT STRIP/BLOOD GLUCOSE: CPT

## 2019-10-04 PROCEDURE — 94760 N-INVAS EAR/PLS OXIMETRY 1: CPT

## 2019-10-04 PROCEDURE — 6360000002 HC RX W HCPCS: Performed by: FAMILY MEDICINE

## 2019-10-04 PROCEDURE — 84157 ASSAY OF PROTEIN OTHER: CPT

## 2019-10-04 PROCEDURE — 87075 CULTR BACTERIA EXCEPT BLOOD: CPT

## 2019-10-04 PROCEDURE — 83615 LACTATE (LD) (LDH) ENZYME: CPT

## 2019-10-04 PROCEDURE — 1200000003 HC TELEMETRY R&B

## 2019-10-04 PROCEDURE — 88112 CYTOPATH CELL ENHANCE TECH: CPT

## 2019-10-04 PROCEDURE — 87070 CULTURE OTHR SPECIMN AEROBIC: CPT

## 2019-10-04 PROCEDURE — 89050 BODY FLUID CELL COUNT: CPT

## 2019-10-04 PROCEDURE — 0W9G3ZZ DRAINAGE OF PERITONEAL CAVITY, PERCUTANEOUS APPROACH: ICD-10-PCS | Performed by: RADIOLOGY

## 2019-10-04 PROCEDURE — 6370000000 HC RX 637 (ALT 250 FOR IP): Performed by: FAMILY MEDICINE

## 2019-10-04 PROCEDURE — P9047 ALBUMIN (HUMAN), 25%, 50ML: HCPCS | Performed by: PHYSICIAN ASSISTANT

## 2019-10-04 PROCEDURE — 2580000003 HC RX 258: Performed by: FAMILY MEDICINE

## 2019-10-04 PROCEDURE — 88305 TISSUE EXAM BY PATHOLOGIST: CPT

## 2019-10-04 PROCEDURE — 49083 ABD PARACENTESIS W/IMAGING: CPT

## 2019-10-04 PROCEDURE — 99232 SBSQ HOSP IP/OBS MODERATE 35: CPT | Performed by: PHYSICIAN ASSISTANT

## 2019-10-04 PROCEDURE — 6360000002 HC RX W HCPCS: Performed by: PHYSICIAN ASSISTANT

## 2019-10-04 RX ORDER — CYCLOBENZAPRINE HCL 10 MG
5 TABLET ORAL NIGHTLY PRN
Status: DISCONTINUED | OUTPATIENT
Start: 2019-10-04 | End: 2019-10-06 | Stop reason: HOSPADM

## 2019-10-04 RX ORDER — ALBUMIN (HUMAN) 12.5 G/50ML
25 SOLUTION INTRAVENOUS ONCE
Status: COMPLETED | OUTPATIENT
Start: 2019-10-04 | End: 2019-10-04

## 2019-10-04 RX ORDER — LEVOTHYROXINE SODIUM 0.15 MG/1
300 TABLET ORAL DAILY
COMMUNITY

## 2019-10-04 RX ADMIN — INSULIN LISPRO 5 UNITS: 100 INJECTION, SOLUTION INTRAVENOUS; SUBCUTANEOUS at 08:28

## 2019-10-04 RX ADMIN — INSULIN LISPRO 1 UNITS: 100 INJECTION, SOLUTION INTRAVENOUS; SUBCUTANEOUS at 00:46

## 2019-10-04 RX ADMIN — SIMVASTATIN 40 MG: 40 TABLET, FILM COATED ORAL at 00:38

## 2019-10-04 RX ADMIN — INSULIN LISPRO 5 UNITS: 100 INJECTION, SOLUTION INTRAVENOUS; SUBCUTANEOUS at 12:40

## 2019-10-04 RX ADMIN — LEVOTHYROXINE SODIUM 300 MCG: 150 TABLET ORAL at 05:18

## 2019-10-04 RX ADMIN — FUROSEMIDE 40 MG: 40 TABLET ORAL at 08:28

## 2019-10-04 RX ADMIN — SIMVASTATIN 40 MG: 40 TABLET, FILM COATED ORAL at 20:03

## 2019-10-04 RX ADMIN — SPIRONOLACTONE 50 MG: 25 TABLET ORAL at 08:28

## 2019-10-04 RX ADMIN — INSULIN GLARGINE 25 UNITS: 100 INJECTION, SOLUTION SUBCUTANEOUS at 00:47

## 2019-10-04 RX ADMIN — FUROSEMIDE 40 MG: 40 TABLET ORAL at 00:46

## 2019-10-04 RX ADMIN — ALBUMIN (HUMAN) 25 G: 0.25 INJECTION, SOLUTION INTRAVENOUS at 18:05

## 2019-10-04 RX ADMIN — INSULIN LISPRO 5 UNITS: 100 INJECTION, SOLUTION INTRAVENOUS; SUBCUTANEOUS at 17:34

## 2019-10-04 RX ADMIN — SERTRALINE 50 MG: 50 TABLET, FILM COATED ORAL at 08:28

## 2019-10-04 RX ADMIN — SODIUM CHLORIDE, PRESERVATIVE FREE 10 ML: 5 INJECTION INTRAVENOUS at 00:49

## 2019-10-04 RX ADMIN — SODIUM CHLORIDE, PRESERVATIVE FREE 10 ML: 5 INJECTION INTRAVENOUS at 20:03

## 2019-10-04 RX ADMIN — SODIUM CHLORIDE, PRESERVATIVE FREE 10 ML: 5 INJECTION INTRAVENOUS at 08:28

## 2019-10-04 RX ADMIN — INSULIN GLARGINE 25 UNITS: 100 INJECTION, SOLUTION SUBCUTANEOUS at 20:03

## 2019-10-04 RX ADMIN — ONDANSETRON 4 MG: 2 INJECTION INTRAMUSCULAR; INTRAVENOUS at 08:41

## 2019-10-04 ASSESSMENT — PAIN DESCRIPTION - FREQUENCY
FREQUENCY: CONTINUOUS
FREQUENCY: CONTINUOUS

## 2019-10-04 ASSESSMENT — PAIN DESCRIPTION - PAIN TYPE
TYPE: ACUTE PAIN
TYPE: ACUTE PAIN

## 2019-10-04 ASSESSMENT — PAIN DESCRIPTION - DESCRIPTORS: DESCRIPTORS: SHOOTING

## 2019-10-04 ASSESSMENT — PAIN DESCRIPTION - LOCATION
LOCATION: ABDOMEN
LOCATION: ABDOMEN

## 2019-10-04 ASSESSMENT — PAIN DESCRIPTION - ORIENTATION
ORIENTATION: UPPER
ORIENTATION: MID

## 2019-10-04 ASSESSMENT — PAIN DESCRIPTION - ONSET
ONSET: ON-GOING
ONSET: ON-GOING

## 2019-10-04 ASSESSMENT — PAIN DESCRIPTION - PROGRESSION: CLINICAL_PROGRESSION: NOT CHANGED

## 2019-10-04 ASSESSMENT — PAIN - FUNCTIONAL ASSESSMENT: PAIN_FUNCTIONAL_ASSESSMENT: PREVENTS OR INTERFERES SOME ACTIVE ACTIVITIES AND ADLS

## 2019-10-04 ASSESSMENT — PAIN SCALES - GENERAL
PAINLEVEL_OUTOF10: 6
PAINLEVEL_OUTOF10: 2

## 2019-10-05 LAB
ALBUMIN SERPL-MCNC: 3 G/DL (ref 3.5–5.1)
ALP BLD-CCNC: 124 U/L (ref 38–126)
ALT SERPL-CCNC: 13 U/L (ref 11–66)
ANION GAP SERPL CALCULATED.3IONS-SCNC: 12 MEQ/L (ref 8–16)
AST SERPL-CCNC: 19 U/L (ref 5–40)
AVERAGE GLUCOSE: 276 MG/DL (ref 70–126)
BASOPHILS # BLD: 0.6 %
BASOPHILS ABSOLUTE: 0 THOU/MM3 (ref 0–0.1)
BILIRUB SERPL-MCNC: 0.7 MG/DL (ref 0.3–1.2)
BUN BLDV-MCNC: 26 MG/DL (ref 7–22)
CALCIUM SERPL-MCNC: 8.2 MG/DL (ref 8.5–10.5)
CHLORIDE BLD-SCNC: 102 MEQ/L (ref 98–111)
CO2: 20 MEQ/L (ref 23–33)
CREAT SERPL-MCNC: 1.9 MG/DL (ref 0.4–1.2)
EOSINOPHIL # BLD: 0 %
EOSINOPHILS ABSOLUTE: 0 THOU/MM3 (ref 0–0.4)
ERYTHROCYTE [DISTWIDTH] IN BLOOD BY AUTOMATED COUNT: 14.1 % (ref 11.5–14.5)
ERYTHROCYTE [DISTWIDTH] IN BLOOD BY AUTOMATED COUNT: 50.8 FL (ref 35–45)
GFR SERPL CREATININE-BSD FRML MDRD: 27 ML/MIN/1.73M2
GLUCOSE BLD-MCNC: 255 MG/DL (ref 70–108)
GLUCOSE BLD-MCNC: 258 MG/DL (ref 70–108)
GLUCOSE BLD-MCNC: 267 MG/DL (ref 70–108)
GLUCOSE BLD-MCNC: 308 MG/DL (ref 70–108)
GLUCOSE BLD-MCNC: 323 MG/DL (ref 70–108)
HBA1C MFR BLD: 11.2 % (ref 4.4–6.4)
HCT VFR BLD CALC: 32.6 % (ref 37–47)
HEMOGLOBIN: 10.4 GM/DL (ref 12–16)
IMMATURE GRANS (ABS): 0.01 THOU/MM3 (ref 0–0.07)
IMMATURE GRANULOCYTES: 0.2 %
LYMPHOCYTES # BLD: 11.1 %
LYMPHOCYTES ABSOLUTE: 0.5 THOU/MM3 (ref 1–4.8)
MCH RBC QN AUTO: 31.7 PG (ref 26–33)
MCHC RBC AUTO-ENTMCNC: 31.9 GM/DL (ref 32.2–35.5)
MCV RBC AUTO: 99.4 FL (ref 81–99)
MONOCYTES # BLD: 11.1 %
MONOCYTES ABSOLUTE: 0.5 THOU/MM3 (ref 0.4–1.3)
NUCLEATED RED BLOOD CELLS: 0 /100 WBC
PLATELET # BLD: 85 THOU/MM3 (ref 130–400)
PMV BLD AUTO: 10.2 FL (ref 9.4–12.4)
POTASSIUM SERPL-SCNC: 4.3 MEQ/L (ref 3.5–5.2)
RBC # BLD: 3.28 MILL/MM3 (ref 4.2–5.4)
SEG NEUTROPHILS: 77 %
SEGMENTED NEUTROPHILS ABSOLUTE COUNT: 3.6 THOU/MM3 (ref 1.8–7.7)
SODIUM BLD-SCNC: 134 MEQ/L (ref 135–145)
TOTAL PROTEIN: 5.8 G/DL (ref 6.1–8)
WBC # BLD: 4.7 THOU/MM3 (ref 4.8–10.8)

## 2019-10-05 PROCEDURE — 80053 COMPREHEN METABOLIC PANEL: CPT

## 2019-10-05 PROCEDURE — 36415 COLL VENOUS BLD VENIPUNCTURE: CPT

## 2019-10-05 PROCEDURE — 6370000000 HC RX 637 (ALT 250 FOR IP): Performed by: PHYSICIAN ASSISTANT

## 2019-10-05 PROCEDURE — 85025 COMPLETE CBC W/AUTO DIFF WBC: CPT

## 2019-10-05 PROCEDURE — 1200000003 HC TELEMETRY R&B

## 2019-10-05 PROCEDURE — 99232 SBSQ HOSP IP/OBS MODERATE 35: CPT | Performed by: PHYSICIAN ASSISTANT

## 2019-10-05 PROCEDURE — 82948 REAGENT STRIP/BLOOD GLUCOSE: CPT

## 2019-10-05 PROCEDURE — 6370000000 HC RX 637 (ALT 250 FOR IP): Performed by: FAMILY MEDICINE

## 2019-10-05 PROCEDURE — 83036 HEMOGLOBIN GLYCOSYLATED A1C: CPT

## 2019-10-05 PROCEDURE — 94760 N-INVAS EAR/PLS OXIMETRY 1: CPT

## 2019-10-05 PROCEDURE — 6360000002 HC RX W HCPCS: Performed by: FAMILY MEDICINE

## 2019-10-05 PROCEDURE — 2580000003 HC RX 258: Performed by: FAMILY MEDICINE

## 2019-10-05 RX ORDER — MIDODRINE HYDROCHLORIDE 5 MG/1
5 TABLET ORAL
Status: DISCONTINUED | OUTPATIENT
Start: 2019-10-05 | End: 2019-10-06 | Stop reason: HOSPADM

## 2019-10-05 RX ORDER — FUROSEMIDE 20 MG/1
20 TABLET ORAL ONCE
Status: COMPLETED | OUTPATIENT
Start: 2019-10-05 | End: 2019-10-05

## 2019-10-05 RX ORDER — SUCRALFATE 1 G/1
1 TABLET ORAL
Status: DISCONTINUED | OUTPATIENT
Start: 2019-10-05 | End: 2019-10-06 | Stop reason: HOSPADM

## 2019-10-05 RX ORDER — INSULIN GLARGINE 100 [IU]/ML
30 INJECTION, SOLUTION SUBCUTANEOUS NIGHTLY
Status: DISCONTINUED | OUTPATIENT
Start: 2019-10-05 | End: 2019-10-06 | Stop reason: HOSPADM

## 2019-10-05 RX ADMIN — ONDANSETRON 4 MG: 2 INJECTION INTRAMUSCULAR; INTRAVENOUS at 07:48

## 2019-10-05 RX ADMIN — SUCRALFATE 1 G: 1 TABLET ORAL at 21:06

## 2019-10-05 RX ADMIN — SPIRONOLACTONE 50 MG: 25 TABLET ORAL at 13:38

## 2019-10-05 RX ADMIN — INSULIN LISPRO 5 UNITS: 100 INJECTION, SOLUTION INTRAVENOUS; SUBCUTANEOUS at 13:35

## 2019-10-05 RX ADMIN — MIDODRINE HYDROCHLORIDE 5 MG: 5 TABLET ORAL at 16:33

## 2019-10-05 RX ADMIN — SUCRALFATE 1 G: 1 TABLET ORAL at 16:33

## 2019-10-05 RX ADMIN — SODIUM CHLORIDE, PRESERVATIVE FREE 10 ML: 5 INJECTION INTRAVENOUS at 09:00

## 2019-10-05 RX ADMIN — INSULIN LISPRO 5 UNITS: 100 INJECTION, SOLUTION INTRAVENOUS; SUBCUTANEOUS at 09:40

## 2019-10-05 RX ADMIN — SIMVASTATIN 40 MG: 40 TABLET, FILM COATED ORAL at 21:06

## 2019-10-05 RX ADMIN — LEVOTHYROXINE SODIUM 300 MCG: 150 TABLET ORAL at 07:20

## 2019-10-05 RX ADMIN — FUROSEMIDE 20 MG: 40 TABLET ORAL at 13:37

## 2019-10-05 RX ADMIN — SODIUM CHLORIDE, PRESERVATIVE FREE 10 ML: 5 INJECTION INTRAVENOUS at 21:00

## 2019-10-05 RX ADMIN — INSULIN LISPRO 10 UNITS: 100 INJECTION, SOLUTION INTRAVENOUS; SUBCUTANEOUS at 16:33

## 2019-10-05 RX ADMIN — INSULIN GLARGINE 30 UNITS: 100 INJECTION, SOLUTION SUBCUTANEOUS at 21:06

## 2019-10-05 ASSESSMENT — PAIN SCALES - GENERAL
PAINLEVEL_OUTOF10: 0
PAINLEVEL_OUTOF10: 6
PAINLEVEL_OUTOF10: 0

## 2019-10-05 ASSESSMENT — PAIN DESCRIPTION - PROGRESSION

## 2019-10-05 ASSESSMENT — PAIN DESCRIPTION - DESCRIPTORS: DESCRIPTORS: SHOOTING

## 2019-10-05 ASSESSMENT — PAIN DESCRIPTION - ORIENTATION: ORIENTATION: LOWER;LEFT

## 2019-10-05 ASSESSMENT — PAIN DESCRIPTION - ONSET: ONSET: ON-GOING

## 2019-10-05 ASSESSMENT — PAIN DESCRIPTION - FREQUENCY: FREQUENCY: INTERMITTENT

## 2019-10-05 ASSESSMENT — PAIN DESCRIPTION - PAIN TYPE: TYPE: ACUTE PAIN

## 2019-10-05 ASSESSMENT — PAIN - FUNCTIONAL ASSESSMENT: PAIN_FUNCTIONAL_ASSESSMENT: PREVENTS OR INTERFERES SOME ACTIVE ACTIVITIES AND ADLS

## 2019-10-05 ASSESSMENT — PAIN DESCRIPTION - LOCATION: LOCATION: ABDOMEN

## 2019-10-06 VITALS
SYSTOLIC BLOOD PRESSURE: 110 MMHG | BODY MASS INDEX: 38 KG/M2 | HEIGHT: 64 IN | RESPIRATION RATE: 16 BRPM | DIASTOLIC BLOOD PRESSURE: 54 MMHG | TEMPERATURE: 98 F | OXYGEN SATURATION: 95 % | WEIGHT: 222.6 LBS | HEART RATE: 75 BPM

## 2019-10-06 LAB
ALBUMIN SERPL-MCNC: 2.9 G/DL (ref 3.5–5.1)
ALP BLD-CCNC: 121 U/L (ref 38–126)
ALT SERPL-CCNC: 14 U/L (ref 11–66)
ANION GAP SERPL CALCULATED.3IONS-SCNC: 10 MEQ/L (ref 8–16)
AST SERPL-CCNC: 20 U/L (ref 5–40)
BASOPHILS # BLD: 0.8 %
BASOPHILS ABSOLUTE: 0 THOU/MM3 (ref 0–0.1)
BILIRUB SERPL-MCNC: 0.5 MG/DL (ref 0.3–1.2)
BUN BLDV-MCNC: 28 MG/DL (ref 7–22)
CALCIUM SERPL-MCNC: 8.3 MG/DL (ref 8.5–10.5)
CHLORIDE BLD-SCNC: 104 MEQ/L (ref 98–111)
CO2: 23 MEQ/L (ref 23–33)
CREAT SERPL-MCNC: 2 MG/DL (ref 0.4–1.2)
EOSINOPHIL # BLD: 0 %
EOSINOPHILS ABSOLUTE: 0 THOU/MM3 (ref 0–0.4)
ERYTHROCYTE [DISTWIDTH] IN BLOOD BY AUTOMATED COUNT: 14 % (ref 11.5–14.5)
ERYTHROCYTE [DISTWIDTH] IN BLOOD BY AUTOMATED COUNT: 51.1 FL (ref 35–45)
GFR SERPL CREATININE-BSD FRML MDRD: 25 ML/MIN/1.73M2
GLUCOSE BLD-MCNC: 189 MG/DL (ref 70–108)
GLUCOSE BLD-MCNC: 217 MG/DL (ref 70–108)
GLUCOSE BLD-MCNC: 245 MG/DL (ref 70–108)
GLUCOSE BLD-MCNC: 281 MG/DL (ref 70–108)
HCT VFR BLD CALC: 33.3 % (ref 37–47)
HEMOGLOBIN: 10.7 GM/DL (ref 12–16)
IMMATURE GRANS (ABS): 0.02 THOU/MM3 (ref 0–0.07)
IMMATURE GRANULOCYTES: 0.5 %
LYMPHOCYTES # BLD: 11.6 %
LYMPHOCYTES ABSOLUTE: 0.5 THOU/MM3 (ref 1–4.8)
MCH RBC QN AUTO: 32 PG (ref 26–33)
MCHC RBC AUTO-ENTMCNC: 32.1 GM/DL (ref 32.2–35.5)
MCV RBC AUTO: 99.7 FL (ref 81–99)
MONOCYTES # BLD: 11.6 %
MONOCYTES ABSOLUTE: 0.5 THOU/MM3 (ref 0.4–1.3)
NUCLEATED RED BLOOD CELLS: 0 /100 WBC
PLATELET # BLD: 78 THOU/MM3 (ref 130–400)
PMV BLD AUTO: 10 FL (ref 9.4–12.4)
POTASSIUM SERPL-SCNC: 4.5 MEQ/L (ref 3.5–5.2)
RBC # BLD: 3.34 MILL/MM3 (ref 4.2–5.4)
SEG NEUTROPHILS: 75.5 %
SEGMENTED NEUTROPHILS ABSOLUTE COUNT: 3 THOU/MM3 (ref 1.8–7.7)
SODIUM BLD-SCNC: 137 MEQ/L (ref 135–145)
TOTAL PROTEIN: 5.6 G/DL (ref 6.1–8)
WBC # BLD: 4 THOU/MM3 (ref 4.8–10.8)

## 2019-10-06 PROCEDURE — 6370000000 HC RX 637 (ALT 250 FOR IP): Performed by: FAMILY MEDICINE

## 2019-10-06 PROCEDURE — 6370000000 HC RX 637 (ALT 250 FOR IP): Performed by: PHYSICIAN ASSISTANT

## 2019-10-06 PROCEDURE — 99239 HOSP IP/OBS DSCHRG MGMT >30: CPT | Performed by: PHYSICIAN ASSISTANT

## 2019-10-06 PROCEDURE — 82948 REAGENT STRIP/BLOOD GLUCOSE: CPT

## 2019-10-06 PROCEDURE — 94760 N-INVAS EAR/PLS OXIMETRY 1: CPT

## 2019-10-06 PROCEDURE — 2580000003 HC RX 258: Performed by: FAMILY MEDICINE

## 2019-10-06 PROCEDURE — 85025 COMPLETE CBC W/AUTO DIFF WBC: CPT

## 2019-10-06 PROCEDURE — 6360000002 HC RX W HCPCS: Performed by: PHYSICIAN ASSISTANT

## 2019-10-06 PROCEDURE — 6360000002 HC RX W HCPCS: Performed by: FAMILY MEDICINE

## 2019-10-06 PROCEDURE — 36415 COLL VENOUS BLD VENIPUNCTURE: CPT

## 2019-10-06 PROCEDURE — 80053 COMPREHEN METABOLIC PANEL: CPT

## 2019-10-06 PROCEDURE — P9047 ALBUMIN (HUMAN), 25%, 50ML: HCPCS | Performed by: PHYSICIAN ASSISTANT

## 2019-10-06 RX ORDER — SPIRONOLACTONE 50 MG/1
50 TABLET, FILM COATED ORAL DAILY
Qty: 30 TABLET | Refills: 3 | Status: ON HOLD | OUTPATIENT
Start: 2019-10-07 | End: 2019-10-25 | Stop reason: SDUPTHER

## 2019-10-06 RX ORDER — ALBUMIN (HUMAN) 12.5 G/50ML
25 SOLUTION INTRAVENOUS ONCE
Status: COMPLETED | OUTPATIENT
Start: 2019-10-06 | End: 2019-10-06

## 2019-10-06 RX ADMIN — INSULIN LISPRO 10 UNITS: 100 INJECTION, SOLUTION INTRAVENOUS; SUBCUTANEOUS at 07:58

## 2019-10-06 RX ADMIN — ALBUMIN (HUMAN) 25 G: 0.25 INJECTION, SOLUTION INTRAVENOUS at 12:18

## 2019-10-06 RX ADMIN — SPIRONOLACTONE 50 MG: 25 TABLET ORAL at 07:58

## 2019-10-06 RX ADMIN — SUCRALFATE 1 G: 1 TABLET ORAL at 16:55

## 2019-10-06 RX ADMIN — SUCRALFATE 1 G: 1 TABLET ORAL at 12:18

## 2019-10-06 RX ADMIN — INSULIN LISPRO 10 UNITS: 100 INJECTION, SOLUTION INTRAVENOUS; SUBCUTANEOUS at 16:55

## 2019-10-06 RX ADMIN — SUCRALFATE 1 G: 1 TABLET ORAL at 07:58

## 2019-10-06 RX ADMIN — SODIUM CHLORIDE, PRESERVATIVE FREE 10 ML: 5 INJECTION INTRAVENOUS at 07:58

## 2019-10-06 RX ADMIN — MIDODRINE HYDROCHLORIDE 5 MG: 5 TABLET ORAL at 07:58

## 2019-10-06 RX ADMIN — ONDANSETRON 4 MG: 2 INJECTION INTRAMUSCULAR; INTRAVENOUS at 07:58

## 2019-10-06 RX ADMIN — LEVOTHYROXINE SODIUM 300 MCG: 150 TABLET ORAL at 06:09

## 2019-10-06 RX ADMIN — INSULIN LISPRO 10 UNITS: 100 INJECTION, SOLUTION INTRAVENOUS; SUBCUTANEOUS at 12:18

## 2019-10-06 RX ADMIN — MIDODRINE HYDROCHLORIDE 5 MG: 5 TABLET ORAL at 12:18

## 2019-10-06 RX ADMIN — MIDODRINE HYDROCHLORIDE 5 MG: 5 TABLET ORAL at 16:55

## 2019-10-06 ASSESSMENT — PAIN DESCRIPTION - PROGRESSION

## 2019-10-06 ASSESSMENT — PAIN SCALES - GENERAL
PAINLEVEL_OUTOF10: 0

## 2019-10-09 LAB
ANAEROBIC CULTURE: NORMAL
BODY FLUID CULTURE, STERILE: NORMAL
GRAM STAIN RESULT: NORMAL

## 2019-10-21 ENCOUNTER — HOSPITAL ENCOUNTER (INPATIENT)
Age: 59
LOS: 4 days | Discharge: HOME | DRG: 283 | End: 2019-10-25
Attending: INTERNAL MEDICINE | Admitting: INTERNAL MEDICINE
Payer: MEDICAID

## 2019-10-21 LAB
ALBUMIN SERPL-MCNC: 2.9 G/DL (ref 3.5–5.1)
ALP BLD-CCNC: 118 U/L (ref 38–126)
ALT SERPL-CCNC: 12 U/L (ref 11–66)
ANION GAP SERPL CALCULATED.3IONS-SCNC: 10 MEQ/L (ref 8–16)
APTT: 27.2 SECONDS (ref 22–38)
AST SERPL-CCNC: 12 U/L (ref 5–40)
BASOPHILS # BLD: 0.6 %
BASOPHILS ABSOLUTE: 0 THOU/MM3 (ref 0–0.1)
BILIRUB SERPL-MCNC: 0.5 MG/DL (ref 0.3–1.2)
BILIRUBIN DIRECT: < 0.2 MG/DL (ref 0–0.3)
BUN BLDV-MCNC: 22 MG/DL (ref 7–22)
CALCIUM SERPL-MCNC: 8.6 MG/DL (ref 8.5–10.5)
CHLORIDE BLD-SCNC: 103 MEQ/L (ref 98–111)
CO2: 20 MEQ/L (ref 23–33)
CREAT SERPL-MCNC: 2 MG/DL (ref 0.4–1.2)
EOSINOPHIL # BLD: 0 %
EOSINOPHILS ABSOLUTE: 0 THOU/MM3 (ref 0–0.4)
ERYTHROCYTE [DISTWIDTH] IN BLOOD BY AUTOMATED COUNT: 14 % (ref 11.5–14.5)
ERYTHROCYTE [DISTWIDTH] IN BLOOD BY AUTOMATED COUNT: 51 FL (ref 35–45)
GFR SERPL CREATININE-BSD FRML MDRD: 25 ML/MIN/1.73M2
GLUCOSE BLD-MCNC: 366 MG/DL (ref 70–108)
HCT VFR BLD CALC: 34.3 % (ref 37–47)
HEMOGLOBIN: 11.1 GM/DL (ref 12–16)
IMMATURE GRANS (ABS): 0.02 THOU/MM3 (ref 0–0.07)
IMMATURE GRANULOCYTES: 0.4 %
INR BLD: 1.16 (ref 0.85–1.13)
LYMPHOCYTES # BLD: 9.4 %
LYMPHOCYTES ABSOLUTE: 0.4 THOU/MM3 (ref 1–4.8)
MCH RBC QN AUTO: 31.8 PG (ref 26–33)
MCHC RBC AUTO-ENTMCNC: 32.4 GM/DL (ref 32.2–35.5)
MCV RBC AUTO: 98.3 FL (ref 81–99)
MONOCYTES # BLD: 11.5 %
MONOCYTES ABSOLUTE: 0.5 THOU/MM3 (ref 0.4–1.3)
NUCLEATED RED BLOOD CELLS: 0 /100 WBC
PLATELET # BLD: 84 THOU/MM3 (ref 130–400)
PMV BLD AUTO: 10.2 FL (ref 9.4–12.4)
POTASSIUM REFLEX MAGNESIUM: 5 MEQ/L (ref 3.5–5.2)
RBC # BLD: 3.49 MILL/MM3 (ref 4.2–5.4)
SEG NEUTROPHILS: 78.1 %
SEGMENTED NEUTROPHILS ABSOLUTE COUNT: 3.7 THOU/MM3 (ref 1.8–7.7)
SODIUM BLD-SCNC: 133 MEQ/L (ref 135–145)
TOTAL PROTEIN: 6.2 G/DL (ref 6.1–8)
WBC # BLD: 4.7 THOU/MM3 (ref 4.8–10.8)

## 2019-10-21 PROCEDURE — 99221 1ST HOSP IP/OBS SF/LOW 40: CPT | Performed by: INTERNAL MEDICINE

## 2019-10-21 PROCEDURE — 1200000003 HC TELEMETRY R&B

## 2019-10-21 PROCEDURE — 99223 1ST HOSP IP/OBS HIGH 75: CPT | Performed by: PHYSICIAN ASSISTANT

## 2019-10-21 PROCEDURE — 80048 BASIC METABOLIC PNL TOTAL CA: CPT

## 2019-10-21 PROCEDURE — 85610 PROTHROMBIN TIME: CPT

## 2019-10-21 PROCEDURE — 36415 COLL VENOUS BLD VENIPUNCTURE: CPT

## 2019-10-21 PROCEDURE — 80076 HEPATIC FUNCTION PANEL: CPT

## 2019-10-21 PROCEDURE — 85025 COMPLETE CBC W/AUTO DIFF WBC: CPT

## 2019-10-21 PROCEDURE — 85730 THROMBOPLASTIN TIME PARTIAL: CPT

## 2019-10-21 RX ORDER — DEXTROSE MONOHYDRATE 50 MG/ML
100 INJECTION, SOLUTION INTRAVENOUS PRN
Status: DISCONTINUED | OUTPATIENT
Start: 2019-10-21 | End: 2019-10-25 | Stop reason: HOSPADM

## 2019-10-21 RX ORDER — ONDANSETRON 2 MG/ML
4 INJECTION INTRAMUSCULAR; INTRAVENOUS EVERY 6 HOURS PRN
Status: DISCONTINUED | OUTPATIENT
Start: 2019-10-21 | End: 2019-10-25 | Stop reason: HOSPADM

## 2019-10-21 RX ORDER — SODIUM CHLORIDE 0.9 % (FLUSH) 0.9 %
10 SYRINGE (ML) INJECTION PRN
Status: DISCONTINUED | OUTPATIENT
Start: 2019-10-21 | End: 2019-10-25 | Stop reason: HOSPADM

## 2019-10-21 RX ORDER — NICOTINE POLACRILEX 4 MG
15 LOZENGE BUCCAL PRN
Status: DISCONTINUED | OUTPATIENT
Start: 2019-10-21 | End: 2019-10-25 | Stop reason: HOSPADM

## 2019-10-21 RX ORDER — SPIRONOLACTONE 25 MG/1
50 TABLET ORAL DAILY
Status: DISCONTINUED | OUTPATIENT
Start: 2019-10-22 | End: 2019-10-25 | Stop reason: HOSPADM

## 2019-10-21 RX ORDER — MIDODRINE HYDROCHLORIDE 5 MG/1
5 TABLET ORAL
Status: DISCONTINUED | OUTPATIENT
Start: 2019-10-22 | End: 2019-10-24

## 2019-10-21 RX ORDER — FUROSEMIDE 20 MG/1
20 TABLET ORAL DAILY
Status: DISCONTINUED | OUTPATIENT
Start: 2019-10-22 | End: 2019-10-22

## 2019-10-21 RX ORDER — DEXTROSE MONOHYDRATE 25 G/50ML
12.5 INJECTION, SOLUTION INTRAVENOUS PRN
Status: DISCONTINUED | OUTPATIENT
Start: 2019-10-21 | End: 2019-10-25 | Stop reason: HOSPADM

## 2019-10-21 RX ORDER — SUCRALFATE 1 G/1
1 TABLET ORAL
Status: DISCONTINUED | OUTPATIENT
Start: 2019-10-21 | End: 2019-10-22

## 2019-10-21 RX ORDER — INSULIN GLARGINE 100 [IU]/ML
35 INJECTION, SOLUTION SUBCUTANEOUS NIGHTLY
Status: DISCONTINUED | OUTPATIENT
Start: 2019-10-22 | End: 2019-10-23

## 2019-10-21 RX ORDER — POLYETHYLENE GLYCOL 3350 17 G/17G
17 POWDER, FOR SOLUTION ORAL DAILY PRN
Status: DISCONTINUED | OUTPATIENT
Start: 2019-10-21 | End: 2019-10-25 | Stop reason: HOSPADM

## 2019-10-21 RX ORDER — SIMVASTATIN 40 MG
40 TABLET ORAL NIGHTLY
Status: DISCONTINUED | OUTPATIENT
Start: 2019-10-21 | End: 2019-10-25 | Stop reason: HOSPADM

## 2019-10-21 RX ORDER — LEVOTHYROXINE SODIUM 0.15 MG/1
300 TABLET ORAL DAILY
Status: DISCONTINUED | OUTPATIENT
Start: 2019-10-22 | End: 2019-10-25 | Stop reason: HOSPADM

## 2019-10-21 RX ORDER — SODIUM CHLORIDE 0.9 % (FLUSH) 0.9 %
10 SYRINGE (ML) INJECTION EVERY 12 HOURS SCHEDULED
Status: DISCONTINUED | OUTPATIENT
Start: 2019-10-21 | End: 2019-10-25 | Stop reason: HOSPADM

## 2019-10-21 ASSESSMENT — PAIN DESCRIPTION - PROGRESSION
CLINICAL_PROGRESSION: NOT CHANGED
CLINICAL_PROGRESSION: NOT CHANGED

## 2019-10-21 ASSESSMENT — PAIN SCALES - GENERAL: PAINLEVEL_OUTOF10: 10

## 2019-10-21 ASSESSMENT — PAIN DESCRIPTION - PAIN TYPE: TYPE: ACUTE PAIN

## 2019-10-22 ENCOUNTER — APPOINTMENT (OUTPATIENT)
Dept: ULTRASOUND IMAGING | Age: 59
DRG: 283 | End: 2019-10-22
Attending: INTERNAL MEDICINE
Payer: MEDICAID

## 2019-10-22 LAB
ANION GAP SERPL CALCULATED.3IONS-SCNC: 13 MEQ/L (ref 8–16)
BASOPHILS # BLD: 0.5 %
BASOPHILS ABSOLUTE: 0 THOU/MM3 (ref 0–0.1)
BUN BLDV-MCNC: 22 MG/DL (ref 7–22)
CALCIUM SERPL-MCNC: 8.6 MG/DL (ref 8.5–10.5)
CHLORIDE BLD-SCNC: 103 MEQ/L (ref 98–111)
CO2: 18 MEQ/L (ref 23–33)
CREAT SERPL-MCNC: 1.9 MG/DL (ref 0.4–1.2)
EOSINOPHIL # BLD: 1.8 %
EOSINOPHILS ABSOLUTE: 0.1 THOU/MM3 (ref 0–0.4)
ERYTHROCYTE [DISTWIDTH] IN BLOOD BY AUTOMATED COUNT: 14 % (ref 11.5–14.5)
ERYTHROCYTE [DISTWIDTH] IN BLOOD BY AUTOMATED COUNT: 50.4 FL (ref 35–45)
GFR SERPL CREATININE-BSD FRML MDRD: 27 ML/MIN/1.73M2
GLUCOSE BLD-MCNC: 250 MG/DL (ref 70–108)
GLUCOSE BLD-MCNC: 279 MG/DL (ref 70–108)
GLUCOSE BLD-MCNC: 288 MG/DL (ref 70–108)
GLUCOSE BLD-MCNC: 309 MG/DL (ref 70–108)
GLUCOSE BLD-MCNC: 351 MG/DL (ref 70–108)
GLUCOSE BLD-MCNC: 427 MG/DL (ref 70–108)
HCT VFR BLD CALC: 34.5 % (ref 37–47)
HEMOGLOBIN: 11.3 GM/DL (ref 12–16)
IMMATURE GRANS (ABS): 0.02 THOU/MM3 (ref 0–0.07)
IMMATURE GRANULOCYTES: 0.4 %
LYMPHOCYTES # BLD: 10.6 %
LYMPHOCYTES ABSOLUTE: 0.6 THOU/MM3 (ref 1–4.8)
MCH RBC QN AUTO: 32.2 PG (ref 26–33)
MCHC RBC AUTO-ENTMCNC: 32.8 GM/DL (ref 32.2–35.5)
MCV RBC AUTO: 98.3 FL (ref 81–99)
MONOCYTES # BLD: 11.4 %
MONOCYTES ABSOLUTE: 0.6 THOU/MM3 (ref 0.4–1.3)
NUCLEATED RED BLOOD CELLS: 0 /100 WBC
PLATELET # BLD: 97 THOU/MM3 (ref 130–400)
PMV BLD AUTO: 10.2 FL (ref 9.4–12.4)
POTASSIUM REFLEX MAGNESIUM: 4.7 MEQ/L (ref 3.5–5.2)
PROTEIN FLUID: 2 GM/DL
RBC # BLD: 3.51 MILL/MM3 (ref 4.2–5.4)
SEG NEUTROPHILS: 75.3 %
SEGMENTED NEUTROPHILS ABSOLUTE COUNT: 4.1 THOU/MM3 (ref 1.8–7.7)
SODIUM BLD-SCNC: 134 MEQ/L (ref 135–145)
WBC # BLD: 5.5 THOU/MM3 (ref 4.8–10.8)

## 2019-10-22 PROCEDURE — 87075 CULTR BACTERIA EXCEPT BLOOD: CPT

## 2019-10-22 PROCEDURE — 87070 CULTURE OTHR SPECIMN AEROBIC: CPT

## 2019-10-22 PROCEDURE — 87205 SMEAR GRAM STAIN: CPT

## 2019-10-22 PROCEDURE — 85025 COMPLETE CBC W/AUTO DIFF WBC: CPT

## 2019-10-22 PROCEDURE — 99233 SBSQ HOSP IP/OBS HIGH 50: CPT | Performed by: INTERNAL MEDICINE

## 2019-10-22 PROCEDURE — 89050 BODY FLUID CELL COUNT: CPT

## 2019-10-22 PROCEDURE — 36415 COLL VENOUS BLD VENIPUNCTURE: CPT

## 2019-10-22 PROCEDURE — 82948 REAGENT STRIP/BLOOD GLUCOSE: CPT

## 2019-10-22 PROCEDURE — 80048 BASIC METABOLIC PNL TOTAL CA: CPT

## 2019-10-22 PROCEDURE — 84157 ASSAY OF PROTEIN OTHER: CPT

## 2019-10-22 PROCEDURE — 0W9G3ZZ DRAINAGE OF PERITONEAL CAVITY, PERCUTANEOUS APPROACH: ICD-10-PCS | Performed by: RADIOLOGY

## 2019-10-22 PROCEDURE — 1200000003 HC TELEMETRY R&B

## 2019-10-22 PROCEDURE — 2580000003 HC RX 258: Performed by: PHYSICIAN ASSISTANT

## 2019-10-22 PROCEDURE — 49083 ABD PARACENTESIS W/IMAGING: CPT

## 2019-10-22 PROCEDURE — 6360000002 HC RX W HCPCS: Performed by: PHYSICIAN ASSISTANT

## 2019-10-22 PROCEDURE — 94760 N-INVAS EAR/PLS OXIMETRY 1: CPT

## 2019-10-22 PROCEDURE — 6370000000 HC RX 637 (ALT 250 FOR IP): Performed by: PHYSICIAN ASSISTANT

## 2019-10-22 RX ORDER — TRAMADOL HYDROCHLORIDE 50 MG/1
50 TABLET ORAL EVERY 6 HOURS PRN
Status: DISCONTINUED | OUTPATIENT
Start: 2019-10-22 | End: 2019-10-22

## 2019-10-22 RX ORDER — FUROSEMIDE 40 MG/1
40 TABLET ORAL DAILY
Status: DISCONTINUED | OUTPATIENT
Start: 2019-10-23 | End: 2019-10-25 | Stop reason: HOSPADM

## 2019-10-22 RX ADMIN — INSULIN LISPRO 12 UNITS: 100 INJECTION, SOLUTION INTRAVENOUS; SUBCUTANEOUS at 11:45

## 2019-10-22 RX ADMIN — MIDODRINE HYDROCHLORIDE 5 MG: 5 TABLET ORAL at 11:44

## 2019-10-22 RX ADMIN — INSULIN LISPRO 6 UNITS: 100 INJECTION, SOLUTION INTRAVENOUS; SUBCUTANEOUS at 16:32

## 2019-10-22 RX ADMIN — MIDODRINE HYDROCHLORIDE 5 MG: 5 TABLET ORAL at 16:32

## 2019-10-22 RX ADMIN — ONDANSETRON 4 MG: 2 INJECTION INTRAMUSCULAR; INTRAVENOUS at 09:03

## 2019-10-22 RX ADMIN — SIMVASTATIN 40 MG: 40 TABLET, FILM COATED ORAL at 22:16

## 2019-10-22 RX ADMIN — INSULIN LISPRO 3 UNITS: 100 INJECTION, SOLUTION INTRAVENOUS; SUBCUTANEOUS at 22:16

## 2019-10-22 RX ADMIN — SPIRONOLACTONE 50 MG: 25 TABLET ORAL at 11:44

## 2019-10-22 RX ADMIN — INSULIN LISPRO 5 UNITS: 100 INJECTION, SOLUTION INTRAVENOUS; SUBCUTANEOUS at 03:03

## 2019-10-22 RX ADMIN — Medication 10 ML: at 09:04

## 2019-10-22 RX ADMIN — INSULIN GLARGINE 35 UNITS: 100 INJECTION, SOLUTION SUBCUTANEOUS at 22:16

## 2019-10-22 RX ADMIN — Medication 10 ML: at 22:16

## 2019-10-22 RX ADMIN — LEVOTHYROXINE SODIUM 300 MCG: 150 TABLET ORAL at 11:44

## 2019-10-22 RX ADMIN — Medication 10 ML: at 03:06

## 2019-10-22 RX ADMIN — FUROSEMIDE 20 MG: 20 TABLET ORAL at 11:44

## 2019-10-22 ASSESSMENT — PAIN DESCRIPTION - DESCRIPTORS
DESCRIPTORS: ACHING;DISCOMFORT
DESCRIPTORS: SHARP

## 2019-10-22 ASSESSMENT — PAIN DESCRIPTION - PROGRESSION

## 2019-10-22 ASSESSMENT — PAIN DESCRIPTION - ONSET
ONSET: ON-GOING
ONSET: ON-GOING

## 2019-10-22 ASSESSMENT — PAIN DESCRIPTION - LOCATION
LOCATION: ABDOMEN
LOCATION: ABDOMEN

## 2019-10-22 ASSESSMENT — PAIN SCALES - GENERAL
PAINLEVEL_OUTOF10: 10
PAINLEVEL_OUTOF10: 0
PAINLEVEL_OUTOF10: 5
PAINLEVEL_OUTOF10: 0
PAINLEVEL_OUTOF10: 8

## 2019-10-22 ASSESSMENT — PAIN DESCRIPTION - PAIN TYPE
TYPE: ACUTE PAIN

## 2019-10-22 ASSESSMENT — ENCOUNTER SYMPTOMS
ALLERGIC/IMMUNOLOGIC NEGATIVE: 1
VOMITING: 1
ABDOMINAL DISTENTION: 1
NAUSEA: 1
RESPIRATORY NEGATIVE: 1
EYES NEGATIVE: 1

## 2019-10-22 ASSESSMENT — PAIN DESCRIPTION - ORIENTATION
ORIENTATION: LEFT
ORIENTATION: LEFT

## 2019-10-22 ASSESSMENT — PAIN DESCRIPTION - FREQUENCY
FREQUENCY: INTERMITTENT
FREQUENCY: CONTINUOUS

## 2019-10-23 LAB
ALBUMIN SERPL-MCNC: 2.6 G/DL (ref 3.5–5.1)
ALP BLD-CCNC: 114 U/L (ref 38–126)
ALT SERPL-CCNC: 12 U/L (ref 11–66)
ANION GAP SERPL CALCULATED.3IONS-SCNC: 10 MEQ/L (ref 8–16)
AST SERPL-CCNC: 20 U/L (ref 5–40)
BASOPHILS # BLD: 0.5 %
BASOPHILS ABSOLUTE: 0 THOU/MM3 (ref 0–0.1)
BILIRUB SERPL-MCNC: 0.4 MG/DL (ref 0.3–1.2)
BODY FLUID RBC: < 2000 /CUMM
BUN BLDV-MCNC: 26 MG/DL (ref 7–22)
CALCIUM SERPL-MCNC: 8.2 MG/DL (ref 8.5–10.5)
CHARACTER, BODY FLUID: NORMAL
CHLORIDE BLD-SCNC: 101 MEQ/L (ref 98–111)
CO2: 19 MEQ/L (ref 23–33)
COLOR: YELLOW
CREAT SERPL-MCNC: 2.1 MG/DL (ref 0.4–1.2)
EOSINOPHIL # BLD: 0 %
EOSINOPHILS ABSOLUTE: 0 THOU/MM3 (ref 0–0.4)
ERYTHROCYTE [DISTWIDTH] IN BLOOD BY AUTOMATED COUNT: 14 % (ref 11.5–14.5)
ERYTHROCYTE [DISTWIDTH] IN BLOOD BY AUTOMATED COUNT: 52.9 FL (ref 35–45)
GFR SERPL CREATININE-BSD FRML MDRD: 24 ML/MIN/1.73M2
GLUCOSE BLD-MCNC: 176 MG/DL (ref 70–108)
GLUCOSE BLD-MCNC: 231 MG/DL (ref 70–108)
GLUCOSE BLD-MCNC: 353 MG/DL (ref 70–108)
GLUCOSE BLD-MCNC: 382 MG/DL (ref 70–108)
GLUCOSE BLD-MCNC: 416 MG/DL (ref 70–108)
HCT VFR BLD CALC: 34.5 % (ref 37–47)
HEMOGLOBIN: 10.6 GM/DL (ref 12–16)
IMMATURE GRANS (ABS): 0.02 THOU/MM3 (ref 0–0.07)
IMMATURE GRANULOCYTES: 0.5 %
INR BLD: 1.26 (ref 0.85–1.13)
LYMPHOCYTES # BLD: 11.7 %
LYMPHOCYTES ABSOLUTE: 0.5 THOU/MM3 (ref 1–4.8)
MCH RBC QN AUTO: 31.5 PG (ref 26–33)
MCHC RBC AUTO-ENTMCNC: 30.7 GM/DL (ref 32.2–35.5)
MCV RBC AUTO: 102.4 FL (ref 81–99)
MONOCYTES # BLD: 9.9 %
MONOCYTES ABSOLUTE: 0.4 THOU/MM3 (ref 0.4–1.3)
MONONUCLEAR CELLS BODY FLUID: 95.2 %
NUCLEATED RED BLOOD CELLS: 0 /100 WBC
PATHOLOGIST REVIEW: NORMAL
PLATELET # BLD: 84 THOU/MM3 (ref 130–400)
PMV BLD AUTO: 10.1 FL (ref 9.4–12.4)
POLYMORPHONUCLEAR CELLS BODY FLUID: 4.8 %
POTASSIUM SERPL-SCNC: 5.3 MEQ/L (ref 3.5–5.2)
RBC # BLD: 3.37 MILL/MM3 (ref 4.2–5.4)
SEG NEUTROPHILS: 77.4 %
SEGMENTED NEUTROPHILS ABSOLUTE COUNT: 3.3 THOU/MM3 (ref 1.8–7.7)
SODIUM BLD-SCNC: 130 MEQ/L (ref 135–145)
SPECIMEN: NORMAL
TOTAL NUCLEATED CELLS BODY FLUID: 58 /CUMM (ref 0–500)
TOTAL PROTEIN: 5.5 G/DL (ref 6.1–8)
TOTAL VOLUME RECEIVED BODY FLUID: 80 ML
WBC # BLD: 4.3 THOU/MM3 (ref 4.8–10.8)

## 2019-10-23 PROCEDURE — 99232 SBSQ HOSP IP/OBS MODERATE 35: CPT | Performed by: INTERNAL MEDICINE

## 2019-10-23 PROCEDURE — 36415 COLL VENOUS BLD VENIPUNCTURE: CPT

## 2019-10-23 PROCEDURE — 6370000000 HC RX 637 (ALT 250 FOR IP): Performed by: PHYSICIAN ASSISTANT

## 2019-10-23 PROCEDURE — 80053 COMPREHEN METABOLIC PANEL: CPT

## 2019-10-23 PROCEDURE — 85025 COMPLETE CBC W/AUTO DIFF WBC: CPT

## 2019-10-23 PROCEDURE — 0W9G3ZZ DRAINAGE OF PERITONEAL CAVITY, PERCUTANEOUS APPROACH: ICD-10-PCS | Performed by: RADIOLOGY

## 2019-10-23 PROCEDURE — 85610 PROTHROMBIN TIME: CPT

## 2019-10-23 PROCEDURE — 6370000000 HC RX 637 (ALT 250 FOR IP): Performed by: INTERNAL MEDICINE

## 2019-10-23 PROCEDURE — 82948 REAGENT STRIP/BLOOD GLUCOSE: CPT

## 2019-10-23 PROCEDURE — 6370000000 HC RX 637 (ALT 250 FOR IP): Performed by: NURSE PRACTITIONER

## 2019-10-23 PROCEDURE — 6360000002 HC RX W HCPCS: Performed by: PHYSICIAN ASSISTANT

## 2019-10-23 PROCEDURE — 1200000003 HC TELEMETRY R&B

## 2019-10-23 PROCEDURE — 2580000003 HC RX 258: Performed by: PHYSICIAN ASSISTANT

## 2019-10-23 RX ORDER — INSULIN GLARGINE 100 [IU]/ML
50 INJECTION, SOLUTION SUBCUTANEOUS NIGHTLY
Status: DISCONTINUED | OUTPATIENT
Start: 2019-10-23 | End: 2019-10-25 | Stop reason: HOSPADM

## 2019-10-23 RX ORDER — ALBUMIN (HUMAN) 12.5 G/50ML
25 SOLUTION INTRAVENOUS ONCE
Status: COMPLETED | OUTPATIENT
Start: 2019-10-24 | End: 2019-10-24

## 2019-10-23 RX ADMIN — Medication 10 ML: at 20:57

## 2019-10-23 RX ADMIN — MIDODRINE HYDROCHLORIDE 5 MG: 5 TABLET ORAL at 11:23

## 2019-10-23 RX ADMIN — MIDODRINE HYDROCHLORIDE 5 MG: 5 TABLET ORAL at 09:02

## 2019-10-23 RX ADMIN — ONDANSETRON 4 MG: 2 INJECTION INTRAMUSCULAR; INTRAVENOUS at 20:56

## 2019-10-23 RX ADMIN — INSULIN GLARGINE 50 UNITS: 100 INJECTION, SOLUTION SUBCUTANEOUS at 20:55

## 2019-10-23 RX ADMIN — LEVOTHYROXINE SODIUM 300 MCG: 150 TABLET ORAL at 05:31

## 2019-10-23 RX ADMIN — INSULIN LISPRO 12 UNITS: 100 INJECTION, SOLUTION INTRAVENOUS; SUBCUTANEOUS at 07:53

## 2019-10-23 RX ADMIN — INSULIN LISPRO 10 UNITS: 100 INJECTION, SOLUTION INTRAVENOUS; SUBCUTANEOUS at 17:04

## 2019-10-23 RX ADMIN — INSULIN LISPRO 10 UNITS: 100 INJECTION, SOLUTION INTRAVENOUS; SUBCUTANEOUS at 11:20

## 2019-10-23 RX ADMIN — SIMVASTATIN 40 MG: 40 TABLET, FILM COATED ORAL at 20:56

## 2019-10-23 RX ADMIN — INSULIN LISPRO 2 UNITS: 100 INJECTION, SOLUTION INTRAVENOUS; SUBCUTANEOUS at 17:04

## 2019-10-23 RX ADMIN — Medication 10 ML: at 09:04

## 2019-10-23 RX ADMIN — FUROSEMIDE 40 MG: 40 TABLET ORAL at 11:23

## 2019-10-23 RX ADMIN — MIDODRINE HYDROCHLORIDE 5 MG: 5 TABLET ORAL at 17:03

## 2019-10-23 RX ADMIN — SPIRONOLACTONE 50 MG: 25 TABLET ORAL at 09:02

## 2019-10-23 RX ADMIN — INSULIN LISPRO 3 UNITS: 100 INJECTION, SOLUTION INTRAVENOUS; SUBCUTANEOUS at 20:55

## 2019-10-23 ASSESSMENT — PAIN DESCRIPTION - ONSET
ONSET: ON-GOING

## 2019-10-23 ASSESSMENT — PAIN SCALES - GENERAL
PAINLEVEL_OUTOF10: 5
PAINLEVEL_OUTOF10: 6
PAINLEVEL_OUTOF10: 0
PAINLEVEL_OUTOF10: 5

## 2019-10-23 ASSESSMENT — PAIN DESCRIPTION - ORIENTATION
ORIENTATION: LEFT
ORIENTATION: LEFT
ORIENTATION: RIGHT;MID;LEFT
ORIENTATION: LEFT
ORIENTATION: MID;LEFT
ORIENTATION: LEFT
ORIENTATION: MID;LEFT
ORIENTATION: LEFT

## 2019-10-23 ASSESSMENT — PAIN DESCRIPTION - LOCATION
LOCATION: ABDOMEN

## 2019-10-23 ASSESSMENT — PAIN DESCRIPTION - DESCRIPTORS
DESCRIPTORS: ACHING;DISCOMFORT
DESCRIPTORS: STABBING;SHARP
DESCRIPTORS: ACHING;DISCOMFORT

## 2019-10-23 ASSESSMENT — PAIN - FUNCTIONAL ASSESSMENT

## 2019-10-23 ASSESSMENT — PAIN DESCRIPTION - PROGRESSION

## 2019-10-23 ASSESSMENT — PAIN DESCRIPTION - FREQUENCY
FREQUENCY: INTERMITTENT

## 2019-10-23 ASSESSMENT — PAIN DESCRIPTION - PAIN TYPE
TYPE: ACUTE PAIN

## 2019-10-24 ENCOUNTER — APPOINTMENT (OUTPATIENT)
Dept: ULTRASOUND IMAGING | Age: 59
DRG: 283 | End: 2019-10-24
Attending: INTERNAL MEDICINE
Payer: MEDICAID

## 2019-10-24 LAB
ALBUMIN SERPL-MCNC: 2.5 G/DL (ref 3.5–5.1)
ALP BLD-CCNC: 126 U/L (ref 38–126)
ALT SERPL-CCNC: 14 U/L (ref 11–66)
ANION GAP SERPL CALCULATED.3IONS-SCNC: 9 MEQ/L (ref 8–16)
AST SERPL-CCNC: 20 U/L (ref 5–40)
BASOPHILS # BLD: 0.7 %
BASOPHILS ABSOLUTE: 0 THOU/MM3 (ref 0–0.1)
BILIRUB SERPL-MCNC: 0.4 MG/DL (ref 0.3–1.2)
BODY FLUID RBC: < 2000 /CUMM
BUN BLDV-MCNC: 30 MG/DL (ref 7–22)
CALCIUM SERPL-MCNC: 8.3 MG/DL (ref 8.5–10.5)
CHARACTER, BODY FLUID: NORMAL
CHLORIDE BLD-SCNC: 102 MEQ/L (ref 98–111)
CO2: 23 MEQ/L (ref 23–33)
COLOR: NORMAL
CREAT SERPL-MCNC: 2.2 MG/DL (ref 0.4–1.2)
EOSINOPHIL # BLD: 0 %
EOSINOPHILS ABSOLUTE: 0 THOU/MM3 (ref 0–0.4)
ERYTHROCYTE [DISTWIDTH] IN BLOOD BY AUTOMATED COUNT: 14 % (ref 11.5–14.5)
ERYTHROCYTE [DISTWIDTH] IN BLOOD BY AUTOMATED COUNT: 50.7 FL (ref 35–45)
GFR SERPL CREATININE-BSD FRML MDRD: 23 ML/MIN/1.73M2
GLUCOSE BLD-MCNC: 184 MG/DL (ref 70–108)
GLUCOSE BLD-MCNC: 256 MG/DL (ref 70–108)
GLUCOSE BLD-MCNC: 271 MG/DL (ref 70–108)
GLUCOSE BLD-MCNC: 283 MG/DL (ref 70–108)
GLUCOSE BLD-MCNC: 329 MG/DL (ref 70–108)
HCT VFR BLD CALC: 34.1 % (ref 37–47)
HEMOGLOBIN: 10.8 GM/DL (ref 12–16)
IMMATURE GRANS (ABS): 0.02 THOU/MM3 (ref 0–0.07)
IMMATURE GRANULOCYTES: 0.5 %
INR BLD: 1.23 (ref 0.85–1.13)
LYMPHOCYTES # BLD: 11.3 %
LYMPHOCYTES ABSOLUTE: 0.5 THOU/MM3 (ref 1–4.8)
MCH RBC QN AUTO: 31.6 PG (ref 26–33)
MCHC RBC AUTO-ENTMCNC: 31.7 GM/DL (ref 32.2–35.5)
MCV RBC AUTO: 99.7 FL (ref 81–99)
MONOCYTES # BLD: 10.4 %
MONOCYTES ABSOLUTE: 0.5 THOU/MM3 (ref 0.4–1.3)
MONONUCLEAR CELLS BODY FLUID: 91.6 %
NUCLEATED RED BLOOD CELLS: 0 /100 WBC
PATHOLOGIST REVIEW: NORMAL
PLATELET # BLD: 84 THOU/MM3 (ref 130–400)
PMV BLD AUTO: 10 FL (ref 9.4–12.4)
POLYMORPHONUCLEAR CELLS BODY FLUID: 8.4 %
POTASSIUM SERPL-SCNC: 5.8 MEQ/L (ref 3.5–5.2)
RBC # BLD: 3.42 MILL/MM3 (ref 4.2–5.4)
SEG NEUTROPHILS: 77.1 %
SEGMENTED NEUTROPHILS ABSOLUTE COUNT: 3.4 THOU/MM3 (ref 1.8–7.7)
SODIUM BLD-SCNC: 134 MEQ/L (ref 135–145)
SPECIMEN: NORMAL
TOTAL NUCLEATED CELLS BODY FLUID: 185 /CUMM (ref 0–500)
TOTAL PROTEIN: 5.7 G/DL (ref 6.1–8)
TOTAL VOLUME RECEIVED BODY FLUID: 80 ML
WBC # BLD: 4.4 THOU/MM3 (ref 4.8–10.8)

## 2019-10-24 PROCEDURE — 89050 BODY FLUID CELL COUNT: CPT

## 2019-10-24 PROCEDURE — 6370000000 HC RX 637 (ALT 250 FOR IP): Performed by: INTERNAL MEDICINE

## 2019-10-24 PROCEDURE — 2580000003 HC RX 258: Performed by: PHYSICIAN ASSISTANT

## 2019-10-24 PROCEDURE — 6360000002 HC RX W HCPCS: Performed by: PHYSICIAN ASSISTANT

## 2019-10-24 PROCEDURE — 6370000000 HC RX 637 (ALT 250 FOR IP): Performed by: NURSE PRACTITIONER

## 2019-10-24 PROCEDURE — 80053 COMPREHEN METABOLIC PANEL: CPT

## 2019-10-24 PROCEDURE — 87075 CULTR BACTERIA EXCEPT BLOOD: CPT

## 2019-10-24 PROCEDURE — 99232 SBSQ HOSP IP/OBS MODERATE 35: CPT | Performed by: INTERNAL MEDICINE

## 2019-10-24 PROCEDURE — 1200000003 HC TELEMETRY R&B

## 2019-10-24 PROCEDURE — 36415 COLL VENOUS BLD VENIPUNCTURE: CPT

## 2019-10-24 PROCEDURE — P9047 ALBUMIN (HUMAN), 25%, 50ML: HCPCS | Performed by: NURSE PRACTITIONER

## 2019-10-24 PROCEDURE — 82948 REAGENT STRIP/BLOOD GLUCOSE: CPT

## 2019-10-24 PROCEDURE — 6370000000 HC RX 637 (ALT 250 FOR IP): Performed by: PHYSICIAN ASSISTANT

## 2019-10-24 PROCEDURE — 6360000002 HC RX W HCPCS: Performed by: NURSE PRACTITIONER

## 2019-10-24 PROCEDURE — 85610 PROTHROMBIN TIME: CPT

## 2019-10-24 PROCEDURE — 87205 SMEAR GRAM STAIN: CPT

## 2019-10-24 PROCEDURE — 49083 ABD PARACENTESIS W/IMAGING: CPT

## 2019-10-24 PROCEDURE — 87070 CULTURE OTHR SPECIMN AEROBIC: CPT

## 2019-10-24 PROCEDURE — 85025 COMPLETE CBC W/AUTO DIFF WBC: CPT

## 2019-10-24 RX ORDER — SODIUM POLYSTYRENE SULFONATE 15 G/60ML
15 SUSPENSION ORAL; RECTAL ONCE
Status: DISCONTINUED | OUTPATIENT
Start: 2019-10-24 | End: 2019-10-25

## 2019-10-24 RX ORDER — MIDODRINE HYDROCHLORIDE 10 MG/1
10 TABLET ORAL
Status: DISCONTINUED | OUTPATIENT
Start: 2019-10-24 | End: 2019-10-25

## 2019-10-24 RX ADMIN — INSULIN LISPRO 10 UNITS: 100 INJECTION, SOLUTION INTRAVENOUS; SUBCUTANEOUS at 16:57

## 2019-10-24 RX ADMIN — LEVOTHYROXINE SODIUM 300 MCG: 150 TABLET ORAL at 06:49

## 2019-10-24 RX ADMIN — ONDANSETRON 4 MG: 2 INJECTION INTRAMUSCULAR; INTRAVENOUS at 03:48

## 2019-10-24 RX ADMIN — INSULIN LISPRO 10 UNITS: 100 INJECTION, SOLUTION INTRAVENOUS; SUBCUTANEOUS at 12:34

## 2019-10-24 RX ADMIN — MIDODRINE HYDROCHLORIDE 10 MG: 10 TABLET ORAL at 16:56

## 2019-10-24 RX ADMIN — ALBUMIN (HUMAN) 25 G: 0.25 INJECTION, SOLUTION INTRAVENOUS at 09:57

## 2019-10-24 RX ADMIN — Medication 10 ML: at 08:40

## 2019-10-24 RX ADMIN — INSULIN LISPRO 8 UNITS: 100 INJECTION, SOLUTION INTRAVENOUS; SUBCUTANEOUS at 12:33

## 2019-10-24 RX ADMIN — SIMVASTATIN 40 MG: 40 TABLET, FILM COATED ORAL at 21:48

## 2019-10-24 RX ADMIN — INSULIN LISPRO 10 UNITS: 100 INJECTION, SOLUTION INTRAVENOUS; SUBCUTANEOUS at 08:45

## 2019-10-24 RX ADMIN — INSULIN LISPRO 3 UNITS: 100 INJECTION, SOLUTION INTRAVENOUS; SUBCUTANEOUS at 21:47

## 2019-10-24 RX ADMIN — Medication 10 ML: at 21:48

## 2019-10-24 RX ADMIN — INSULIN LISPRO 2 UNITS: 100 INJECTION, SOLUTION INTRAVENOUS; SUBCUTANEOUS at 16:57

## 2019-10-24 RX ADMIN — MIDODRINE HYDROCHLORIDE 10 MG: 10 TABLET ORAL at 10:18

## 2019-10-24 RX ADMIN — INSULIN LISPRO 6 UNITS: 100 INJECTION, SOLUTION INTRAVENOUS; SUBCUTANEOUS at 08:45

## 2019-10-24 RX ADMIN — FUROSEMIDE 40 MG: 40 TABLET ORAL at 08:39

## 2019-10-24 RX ADMIN — INSULIN GLARGINE 50 UNITS: 100 INJECTION, SOLUTION SUBCUTANEOUS at 21:48

## 2019-10-24 ASSESSMENT — PAIN DESCRIPTION - PAIN TYPE
TYPE: ACUTE PAIN

## 2019-10-24 ASSESSMENT — PAIN SCALES - GENERAL
PAINLEVEL_OUTOF10: 6
PAINLEVEL_OUTOF10: 3
PAINLEVEL_OUTOF10: 8
PAINLEVEL_OUTOF10: 2
PAINLEVEL_OUTOF10: 4
PAINLEVEL_OUTOF10: 2

## 2019-10-24 ASSESSMENT — PAIN DESCRIPTION - PROGRESSION
CLINICAL_PROGRESSION: NOT CHANGED

## 2019-10-24 ASSESSMENT — PAIN DESCRIPTION - LOCATION
LOCATION: ABDOMEN

## 2019-10-24 ASSESSMENT — PAIN DESCRIPTION - ORIENTATION
ORIENTATION: UPPER

## 2019-10-24 ASSESSMENT — PAIN - FUNCTIONAL ASSESSMENT
PAIN_FUNCTIONAL_ASSESSMENT: ACTIVITIES ARE NOT PREVENTED

## 2019-10-24 ASSESSMENT — PAIN DESCRIPTION - FREQUENCY
FREQUENCY: INTERMITTENT

## 2019-10-24 ASSESSMENT — PAIN DESCRIPTION - DESCRIPTORS
DESCRIPTORS: ACHING

## 2019-10-24 ASSESSMENT — PAIN DESCRIPTION - ONSET
ONSET: ON-GOING

## 2019-10-25 VITALS
OXYGEN SATURATION: 97 % | SYSTOLIC BLOOD PRESSURE: 94 MMHG | WEIGHT: 219.6 LBS | RESPIRATION RATE: 16 BRPM | DIASTOLIC BLOOD PRESSURE: 52 MMHG | HEIGHT: 64 IN | BODY MASS INDEX: 37.49 KG/M2 | TEMPERATURE: 98.8 F | HEART RATE: 77 BPM

## 2019-10-25 LAB
ALBUMIN SERPL-MCNC: 2.7 G/DL (ref 3.5–5.1)
ALP BLD-CCNC: 111 U/L (ref 38–126)
ALT SERPL-CCNC: 13 U/L (ref 11–66)
ANION GAP SERPL CALCULATED.3IONS-SCNC: 13 MEQ/L (ref 8–16)
AST SERPL-CCNC: 19 U/L (ref 5–40)
BASOPHILS # BLD: 1 %
BASOPHILS ABSOLUTE: 0 THOU/MM3 (ref 0–0.1)
BILIRUB SERPL-MCNC: 0.4 MG/DL (ref 0.3–1.2)
BUN BLDV-MCNC: 31 MG/DL (ref 7–22)
CALCIUM SERPL-MCNC: 8.3 MG/DL (ref 8.5–10.5)
CHLORIDE BLD-SCNC: 104 MEQ/L (ref 98–111)
CO2: 21 MEQ/L (ref 23–33)
CREAT SERPL-MCNC: 2 MG/DL (ref 0.4–1.2)
EOSINOPHIL # BLD: 0 %
EOSINOPHILS ABSOLUTE: 0 THOU/MM3 (ref 0–0.4)
ERYTHROCYTE [DISTWIDTH] IN BLOOD BY AUTOMATED COUNT: 13.9 % (ref 11.5–14.5)
ERYTHROCYTE [DISTWIDTH] IN BLOOD BY AUTOMATED COUNT: 50.7 FL (ref 35–45)
GFR SERPL CREATININE-BSD FRML MDRD: 25 ML/MIN/1.73M2
GLUCOSE BLD-MCNC: 251 MG/DL (ref 70–108)
GLUCOSE BLD-MCNC: 260 MG/DL (ref 70–108)
GLUCOSE BLD-MCNC: 268 MG/DL (ref 70–108)
GLUCOSE BLD-MCNC: 343 MG/DL (ref 70–108)
HCT VFR BLD CALC: 34.1 % (ref 37–47)
HEMOGLOBIN: 10.9 GM/DL (ref 12–16)
IMMATURE GRANS (ABS): 0.02 THOU/MM3 (ref 0–0.07)
IMMATURE GRANULOCYTES: 0.5 %
INR BLD: 1.27 (ref 0.85–1.13)
LYMPHOCYTES # BLD: 10.9 %
LYMPHOCYTES ABSOLUTE: 0.4 THOU/MM3 (ref 1–4.8)
MCH RBC QN AUTO: 31.9 PG (ref 26–33)
MCHC RBC AUTO-ENTMCNC: 32 GM/DL (ref 32.2–35.5)
MCV RBC AUTO: 99.7 FL (ref 81–99)
MONOCYTES # BLD: 11.9 %
MONOCYTES ABSOLUTE: 0.5 THOU/MM3 (ref 0.4–1.3)
NUCLEATED RED BLOOD CELLS: 0 /100 WBC
PLATELET # BLD: 81 THOU/MM3 (ref 130–400)
PMV BLD AUTO: 9.8 FL (ref 9.4–12.4)
POTASSIUM SERPL-SCNC: 5.3 MEQ/L (ref 3.5–5.2)
RBC # BLD: 3.42 MILL/MM3 (ref 4.2–5.4)
SEG NEUTROPHILS: 75.7 %
SEGMENTED NEUTROPHILS ABSOLUTE COUNT: 3 THOU/MM3 (ref 1.8–7.7)
SODIUM BLD-SCNC: 138 MEQ/L (ref 135–145)
TOTAL PROTEIN: 5.3 G/DL (ref 6.1–8)
WBC # BLD: 3.9 THOU/MM3 (ref 4.8–10.8)

## 2019-10-25 PROCEDURE — 6370000000 HC RX 637 (ALT 250 FOR IP): Performed by: INTERNAL MEDICINE

## 2019-10-25 PROCEDURE — 2580000003 HC RX 258: Performed by: PHYSICIAN ASSISTANT

## 2019-10-25 PROCEDURE — 82948 REAGENT STRIP/BLOOD GLUCOSE: CPT

## 2019-10-25 PROCEDURE — 85610 PROTHROMBIN TIME: CPT

## 2019-10-25 PROCEDURE — 36415 COLL VENOUS BLD VENIPUNCTURE: CPT

## 2019-10-25 PROCEDURE — 85025 COMPLETE CBC W/AUTO DIFF WBC: CPT

## 2019-10-25 PROCEDURE — 6370000000 HC RX 637 (ALT 250 FOR IP): Performed by: NURSE PRACTITIONER

## 2019-10-25 PROCEDURE — 6370000000 HC RX 637 (ALT 250 FOR IP): Performed by: PHYSICIAN ASSISTANT

## 2019-10-25 PROCEDURE — 80053 COMPREHEN METABOLIC PANEL: CPT

## 2019-10-25 PROCEDURE — 99239 HOSP IP/OBS DSCHRG MGMT >30: CPT | Performed by: INTERNAL MEDICINE

## 2019-10-25 RX ORDER — MIDODRINE HYDROCHLORIDE 5 MG/1
15 TABLET ORAL
Qty: 90 TABLET | Refills: 0 | Status: ON HOLD | OUTPATIENT
Start: 2019-10-25 | End: 2019-12-14 | Stop reason: SDUPTHER

## 2019-10-25 RX ORDER — SPIRONOLACTONE 50 MG/1
50 TABLET, FILM COATED ORAL DAILY
Qty: 30 TABLET | Refills: 0 | Status: SHIPPED | OUTPATIENT
Start: 2019-10-25 | End: 2019-10-25 | Stop reason: SDUPTHER

## 2019-10-25 RX ORDER — FUROSEMIDE 40 MG/1
40 TABLET ORAL DAILY
Qty: 30 TABLET | Refills: 0 | Status: ON HOLD | OUTPATIENT
Start: 2019-10-25 | End: 2019-12-14 | Stop reason: HOSPADM

## 2019-10-25 RX ORDER — SPIRONOLACTONE 50 MG/1
50 TABLET, FILM COATED ORAL DAILY
Qty: 30 TABLET | Refills: 0 | Status: SHIPPED
Start: 2019-10-25 | End: 2019-11-01

## 2019-10-25 RX ADMIN — Medication 10 ML: at 10:57

## 2019-10-25 RX ADMIN — INSULIN LISPRO 6 UNITS: 100 INJECTION, SOLUTION INTRAVENOUS; SUBCUTANEOUS at 13:48

## 2019-10-25 RX ADMIN — FUROSEMIDE 40 MG: 40 TABLET ORAL at 10:57

## 2019-10-25 RX ADMIN — LEVOTHYROXINE SODIUM 300 MCG: 150 TABLET ORAL at 04:24

## 2019-10-25 RX ADMIN — INSULIN HUMAN 10 UNITS: 100 INJECTION, SOLUTION PARENTERAL at 10:54

## 2019-10-25 RX ADMIN — MIDODRINE HYDROCHLORIDE 10 MG: 10 TABLET ORAL at 06:42

## 2019-10-27 LAB
ANAEROBIC CULTURE: NORMAL
BODY FLUID CULTURE, STERILE: NORMAL
GRAM STAIN RESULT: NORMAL

## 2019-10-29 LAB
ANAEROBIC CULTURE: NORMAL
BODY FLUID CULTURE, STERILE: NORMAL
GRAM STAIN RESULT: NORMAL

## 2019-11-01 ENCOUNTER — HOSPITAL ENCOUNTER (OUTPATIENT)
Dept: NURSING | Age: 59
Discharge: HOME | End: 2019-11-01
Payer: MEDICAID

## 2019-11-01 ENCOUNTER — HOSPITAL ENCOUNTER (OUTPATIENT)
Dept: ULTRASOUND IMAGING | Age: 59
Discharge: HOME | End: 2019-11-01
Payer: MEDICAID

## 2019-11-01 VITALS
TEMPERATURE: 97.6 F | DIASTOLIC BLOOD PRESSURE: 57 MMHG | HEART RATE: 87 BPM | RESPIRATION RATE: 16 BRPM | SYSTOLIC BLOOD PRESSURE: 108 MMHG | OXYGEN SATURATION: 99 %

## 2019-11-01 DIAGNOSIS — R18.8 OTHER ASCITES: ICD-10-CM

## 2019-11-01 LAB
ALBUMIN FLUID: 1 GM/DL
BODY FLUID RBC: < 2000 /CUMM
CHARACTER, BODY FLUID: NORMAL
COLOR: NORMAL
MESOTHELIAL CELLS BODY FLUID: NORMAL
MONONUCLEAR CELLS BODY FLUID: 91.3 %
PATHOLOGIST REVIEW: NORMAL
POLYMORPHONUCLEAR CELLS BODY FLUID: 8.7 %
PROTEIN FLUID: 1.7 GM/DL
SPECIMEN: NORMAL
TOTAL NUCLEATED CELLS BODY FLUID: 195 /CUMM (ref 0–500)
TOTAL VOLUME RECEIVED BODY FLUID: 80 ML

## 2019-11-01 PROCEDURE — 6360000002 HC RX W HCPCS: Performed by: NURSE PRACTITIONER

## 2019-11-01 PROCEDURE — 49083 ABD PARACENTESIS W/IMAGING: CPT

## 2019-11-01 PROCEDURE — 87075 CULTR BACTERIA EXCEPT BLOOD: CPT

## 2019-11-01 PROCEDURE — 89050 BODY FLUID CELL COUNT: CPT

## 2019-11-01 PROCEDURE — 87070 CULTURE OTHR SPECIMN AEROBIC: CPT

## 2019-11-01 PROCEDURE — 88305 TISSUE EXAM BY PATHOLOGIST: CPT

## 2019-11-01 PROCEDURE — 96365 THER/PROPH/DIAG IV INF INIT: CPT

## 2019-11-01 PROCEDURE — 87205 SMEAR GRAM STAIN: CPT

## 2019-11-01 PROCEDURE — 82042 OTHER SOURCE ALBUMIN QUAN EA: CPT

## 2019-11-01 PROCEDURE — 88112 CYTOPATH CELL ENHANCE TECH: CPT

## 2019-11-01 PROCEDURE — 84157 ASSAY OF PROTEIN OTHER: CPT

## 2019-11-01 PROCEDURE — P9047 ALBUMIN (HUMAN), 25%, 50ML: HCPCS | Performed by: NURSE PRACTITIONER

## 2019-11-01 PROCEDURE — 2709999900 HC NON-CHARGEABLE SUPPLY

## 2019-11-01 RX ORDER — ALBUMIN (HUMAN) 12.5 G/50ML
25 SOLUTION INTRAVENOUS ONCE
Status: COMPLETED | OUTPATIENT
Start: 2019-11-01 | End: 2019-11-01

## 2019-11-01 RX ADMIN — ALBUMIN (HUMAN) 25 G: 0.25 INJECTION, SOLUTION INTRAVENOUS at 12:45

## 2019-11-01 ASSESSMENT — PAIN - FUNCTIONAL ASSESSMENT: PAIN_FUNCTIONAL_ASSESSMENT: 0-10

## 2019-11-01 NOTE — PROGRESS NOTES
[address] FOR ALBUMIN PROCEDURE REVIEWED WITH PT VERBALIZED Pt rights and responsibilities offered to pt to read. __MET__ Safety:       (Environmental)   [place] to environment   Ensure ID band is correct and in place/ allergy band as needed   Assess for fall risk   Initiate fall precautions as applicable (fall band, side rails, etc.)   Call light within reach   Bed in low position/ wheels locked    __MET__ Pain:        Assess pain level and characteristics   Administer analgesics as ordered   Assess effectiveness of pain management and report to MD as needed    __MET__ Knowledge Deficit:   Assess baseline knowledge   Provide teaching at level of understanding   Provide teaching via preferred learning method   Evaluate teaching effectiveness    ___MET_ Hemodynamic/Respiratory Status:       (Pre and Post Procedure Monitoring)   Assess/Monitor vital signs and LOC      Obtain weight/height   Assess vital signs/ LOC until patient meets discharge criteria   Monitor procedure site and notify MD of any issues    __1345 UP TO BATHROOM INVOLUNTARY OF STOOL. SUPPORT GIVEN. 1350 INFUSION COMPLETE TOLERATED WELL. HOME INSTRUCTIONS TO PT VERBALIZED UNDERSTANDING.  1400 DISCHARGED PER WHEELCHAIR STABLE HOME WITH .

## 2019-11-06 LAB
ANAEROBIC CULTURE: NORMAL
BODY FLUID CULTURE, STERILE: NORMAL
GRAM STAIN RESULT: NORMAL

## 2019-11-15 ENCOUNTER — HOSPITAL ENCOUNTER (OUTPATIENT)
Dept: NURSING | Age: 59
Discharge: HOME | End: 2019-11-15
Payer: MEDICAID

## 2019-11-15 ENCOUNTER — HOSPITAL ENCOUNTER (OUTPATIENT)
Dept: ULTRASOUND IMAGING | Age: 59
Discharge: HOME | End: 2019-11-15
Payer: MEDICAID

## 2019-11-15 VITALS
SYSTOLIC BLOOD PRESSURE: 100 MMHG | RESPIRATION RATE: 16 BRPM | DIASTOLIC BLOOD PRESSURE: 50 MMHG | OXYGEN SATURATION: 97 % | TEMPERATURE: 97.5 F | HEART RATE: 83 BPM

## 2019-11-15 DIAGNOSIS — K70.31 ALCOHOLIC CIRRHOSIS OF LIVER WITH ASCITES (HCC): ICD-10-CM

## 2019-11-15 LAB
ALBUMIN FLUID: 1.2 GM/DL
BODY FLUID RBC: 2000 /CUMM
CHARACTER, BODY FLUID: NORMAL
COLOR: NORMAL
MONONUCLEAR CELLS BODY FLUID: 89.8 %
PATHOLOGIST REVIEW: NORMAL
POLYMORPHONUCLEAR CELLS BODY FLUID: 10.2 %
PROTEIN FLUID: 1.8 GM/DL
SPECIMEN: NORMAL
TOTAL NUCLEATED CELLS BODY FLUID: 86 /CUMM (ref 0–500)
TOTAL VOLUME RECEIVED BODY FLUID: 80 ML

## 2019-11-15 PROCEDURE — 89050 BODY FLUID CELL COUNT: CPT

## 2019-11-15 PROCEDURE — P9047 ALBUMIN (HUMAN), 25%, 50ML: HCPCS | Performed by: NURSE PRACTITIONER

## 2019-11-15 PROCEDURE — 88112 CYTOPATH CELL ENHANCE TECH: CPT

## 2019-11-15 PROCEDURE — 2709999900 HC NON-CHARGEABLE SUPPLY

## 2019-11-15 PROCEDURE — 82042 OTHER SOURCE ALBUMIN QUAN EA: CPT

## 2019-11-15 PROCEDURE — 6360000002 HC RX W HCPCS: Performed by: NURSE PRACTITIONER

## 2019-11-15 PROCEDURE — 49083 ABD PARACENTESIS W/IMAGING: CPT

## 2019-11-15 PROCEDURE — 96365 THER/PROPH/DIAG IV INF INIT: CPT

## 2019-11-15 PROCEDURE — 87070 CULTURE OTHR SPECIMN AEROBIC: CPT

## 2019-11-15 PROCEDURE — 88305 TISSUE EXAM BY PATHOLOGIST: CPT

## 2019-11-15 PROCEDURE — 84157 ASSAY OF PROTEIN OTHER: CPT

## 2019-11-15 PROCEDURE — 87075 CULTR BACTERIA EXCEPT BLOOD: CPT

## 2019-11-15 PROCEDURE — 87205 SMEAR GRAM STAIN: CPT

## 2019-11-15 RX ORDER — ALBUMIN (HUMAN) 12.5 G/50ML
25 SOLUTION INTRAVENOUS ONCE
Status: COMPLETED | OUTPATIENT
Start: 2019-11-15 | End: 2019-11-15

## 2019-11-15 RX ADMIN — ALBUMIN (HUMAN) 25 G: 0.25 INJECTION, SOLUTION INTRAVENOUS at 11:06

## 2019-11-15 ASSESSMENT — PAIN DESCRIPTION - DESCRIPTORS: DESCRIPTORS: ACHING

## 2019-11-15 ASSESSMENT — PAIN - FUNCTIONAL ASSESSMENT: PAIN_FUNCTIONAL_ASSESSMENT: 0-10

## 2019-11-20 LAB
ANAEROBIC CULTURE: NORMAL
BODY FLUID CULTURE, STERILE: NORMAL
GRAM STAIN RESULT: NORMAL

## 2019-11-21 ENCOUNTER — APPOINTMENT (OUTPATIENT)
Dept: GENERAL RADIOLOGY | Age: 59
End: 2019-11-21
Payer: MEDICAID

## 2019-11-21 ENCOUNTER — APPOINTMENT (OUTPATIENT)
Dept: ULTRASOUND IMAGING | Age: 59
End: 2019-11-21
Payer: MEDICAID

## 2019-11-21 ENCOUNTER — HOSPITAL ENCOUNTER (EMERGENCY)
Age: 59
Discharge: HOME | End: 2019-11-21
Payer: MEDICAID

## 2019-11-21 VITALS
DIASTOLIC BLOOD PRESSURE: 73 MMHG | HEART RATE: 74 BPM | OXYGEN SATURATION: 100 % | RESPIRATION RATE: 16 BRPM | BODY MASS INDEX: 39.78 KG/M2 | SYSTOLIC BLOOD PRESSURE: 127 MMHG | TEMPERATURE: 97.7 F | WEIGHT: 233 LBS | HEIGHT: 64 IN

## 2019-11-21 DIAGNOSIS — Z79.4 TYPE 2 DIABETES MELLITUS WITH HYPERGLYCEMIA, WITH LONG-TERM CURRENT USE OF INSULIN (HCC): ICD-10-CM

## 2019-11-21 DIAGNOSIS — K70.31 ALCOHOLIC CIRRHOSIS OF LIVER WITH ASCITES (HCC): Primary | ICD-10-CM

## 2019-11-21 DIAGNOSIS — E11.65 TYPE 2 DIABETES MELLITUS WITH HYPERGLYCEMIA, WITH LONG-TERM CURRENT USE OF INSULIN (HCC): ICD-10-CM

## 2019-11-21 DIAGNOSIS — Z91.14 NONCOMPLIANCE WITH MEDICATION REGIMEN: ICD-10-CM

## 2019-11-21 LAB
ALBUMIN FLUID: 1 GM/DL
ALBUMIN SERPL-MCNC: 3 G/DL (ref 3.5–5.1)
ALP BLD-CCNC: 126 U/L (ref 38–126)
ALT SERPL-CCNC: 12 U/L (ref 11–66)
AMMONIA: 34 UMOL/L (ref 11–60)
ANION GAP SERPL CALCULATED.3IONS-SCNC: 15 MEQ/L (ref 8–16)
APTT: 28.7 SECONDS (ref 22–38)
AST SERPL-CCNC: 11 U/L (ref 5–40)
BACTERIA: ABNORMAL /HPF
BASOPHILS # BLD: 0.7 %
BASOPHILS ABSOLUTE: 0 THOU/MM3 (ref 0–0.1)
BETA-HYDROXYBUTYRATE: 1.04 MG/DL (ref 0.2–2.81)
BILIRUB SERPL-MCNC: 0.6 MG/DL (ref 0.3–1.2)
BILIRUBIN URINE: NEGATIVE
BLOOD, URINE: NEGATIVE
BUN BLDV-MCNC: 24 MG/DL (ref 7–22)
CALCIUM SERPL-MCNC: 8.5 MG/DL (ref 8.5–10.5)
CASTS 2: ABNORMAL /LPF
CASTS UA: ABNORMAL /LPF
CHARACTER, URINE: ABNORMAL
CHLORIDE BLD-SCNC: 97 MEQ/L (ref 98–111)
CO2: 18 MEQ/L (ref 23–33)
COLOR: YELLOW
CREAT SERPL-MCNC: 2.4 MG/DL (ref 0.4–1.2)
CRYSTALS, UA: ABNORMAL
EOSINOPHIL # BLD: 0 %
EOSINOPHILS ABSOLUTE: 0 THOU/MM3 (ref 0–0.4)
EPITHELIAL CELLS, UA: ABNORMAL /HPF
ERYTHROCYTE [DISTWIDTH] IN BLOOD BY AUTOMATED COUNT: 13.9 % (ref 11.5–14.5)
ERYTHROCYTE [DISTWIDTH] IN BLOOD BY AUTOMATED COUNT: 50 FL (ref 35–45)
GFR SERPL CREATININE-BSD FRML MDRD: 21 ML/MIN/1.73M2
GLUCOSE BLD-MCNC: 494 MG/DL (ref 70–108)
GLUCOSE BLD-MCNC: 607 MG/DL (ref 70–108)
GLUCOSE URINE: >= 1000 MG/DL
HCT VFR BLD CALC: 37.6 % (ref 37–47)
HEMOGLOBIN: 12 GM/DL (ref 12–16)
IMMATURE GRANS (ABS): 0.04 THOU/MM3 (ref 0–0.07)
IMMATURE GRANULOCYTES: 0.7 %
INR BLD: 1.15 (ref 0.85–1.13)
KETONES, URINE: NEGATIVE
LEUKOCYTE ESTERASE, URINE: NEGATIVE
LYMPHOCYTES # BLD: 8.5 %
LYMPHOCYTES ABSOLUTE: 0.5 THOU/MM3 (ref 1–4.8)
MCH RBC QN AUTO: 31.3 PG (ref 26–33)
MCHC RBC AUTO-ENTMCNC: 31.9 GM/DL (ref 32.2–35.5)
MCV RBC AUTO: 97.9 FL (ref 81–99)
MISCELLANEOUS 2: ABNORMAL
MONOCYTES # BLD: 9.9 %
MONOCYTES ABSOLUTE: 0.6 THOU/MM3 (ref 0.4–1.3)
NITRITE, URINE: NEGATIVE
NUCLEATED RED BLOOD CELLS: 0 /100 WBC
OSMOLALITY CALCULATION: 293.1 MOSMOL/KG (ref 275–300)
PH UA: 5.5 (ref 5–9)
PLATELET # BLD: 102 THOU/MM3 (ref 130–400)
PMV BLD AUTO: 10.2 FL (ref 9.4–12.4)
POTASSIUM SERPL-SCNC: 3.9 MEQ/L (ref 3.5–5.2)
PROTEIN FLUID: 1.6 GM/DL
PROTEIN UA: ABNORMAL
RBC # BLD: 3.84 MILL/MM3 (ref 4.2–5.4)
RBC URINE: ABNORMAL /HPF
RENAL EPITHELIAL, UA: ABNORMAL
SEG NEUTROPHILS: 80.2 %
SEGMENTED NEUTROPHILS ABSOLUTE COUNT: 4.7 THOU/MM3 (ref 1.8–7.7)
SODIUM BLD-SCNC: 130 MEQ/L (ref 135–145)
SPECIFIC GRAVITY, URINE: 1.03 (ref 1–1.03)
TOTAL PROTEIN: 6.4 G/DL (ref 6.1–8)
UROBILINOGEN, URINE: 0.2 EU/DL (ref 0–1)
WBC # BLD: 5.9 THOU/MM3 (ref 4.8–10.8)
WBC UA: ABNORMAL /HPF
YEAST: ABNORMAL

## 2019-11-21 PROCEDURE — 87075 CULTR BACTERIA EXCEPT BLOOD: CPT

## 2019-11-21 PROCEDURE — 71045 X-RAY EXAM CHEST 1 VIEW: CPT

## 2019-11-21 PROCEDURE — 99284 EMERGENCY DEPT VISIT MOD MDM: CPT

## 2019-11-21 PROCEDURE — 89050 BODY FLUID CELL COUNT: CPT

## 2019-11-21 PROCEDURE — 2580000003 HC RX 258: Performed by: PHYSICIAN ASSISTANT

## 2019-11-21 PROCEDURE — 2709999900 HC NON-CHARGEABLE SUPPLY

## 2019-11-21 PROCEDURE — 88112 CYTOPATH CELL ENHANCE TECH: CPT

## 2019-11-21 PROCEDURE — 36415 COLL VENOUS BLD VENIPUNCTURE: CPT

## 2019-11-21 PROCEDURE — 82140 ASSAY OF AMMONIA: CPT

## 2019-11-21 PROCEDURE — P9047 ALBUMIN (HUMAN), 25%, 50ML: HCPCS | Performed by: PHYSICIAN ASSISTANT

## 2019-11-21 PROCEDURE — 82042 OTHER SOURCE ALBUMIN QUAN EA: CPT

## 2019-11-21 PROCEDURE — 82948 REAGENT STRIP/BLOOD GLUCOSE: CPT

## 2019-11-21 PROCEDURE — 84157 ASSAY OF PROTEIN OTHER: CPT

## 2019-11-21 PROCEDURE — 87070 CULTURE OTHR SPECIMN AEROBIC: CPT

## 2019-11-21 PROCEDURE — 81001 URINALYSIS AUTO W/SCOPE: CPT

## 2019-11-21 PROCEDURE — 49082 ABD PARACENTESIS: CPT

## 2019-11-21 PROCEDURE — 85730 THROMBOPLASTIN TIME PARTIAL: CPT

## 2019-11-21 PROCEDURE — 96374 THER/PROPH/DIAG INJ IV PUSH: CPT

## 2019-11-21 PROCEDURE — 87205 SMEAR GRAM STAIN: CPT

## 2019-11-21 PROCEDURE — 85025 COMPLETE CBC W/AUTO DIFF WBC: CPT

## 2019-11-21 PROCEDURE — 80053 COMPREHEN METABOLIC PANEL: CPT

## 2019-11-21 PROCEDURE — 88305 TISSUE EXAM BY PATHOLOGIST: CPT

## 2019-11-21 PROCEDURE — 85610 PROTHROMBIN TIME: CPT

## 2019-11-21 PROCEDURE — 6370000000 HC RX 637 (ALT 250 FOR IP): Performed by: PHYSICIAN ASSISTANT

## 2019-11-21 PROCEDURE — 49083 ABD PARACENTESIS W/IMAGING: CPT

## 2019-11-21 PROCEDURE — 6360000002 HC RX W HCPCS: Performed by: PHYSICIAN ASSISTANT

## 2019-11-21 PROCEDURE — 82010 KETONE BODYS QUAN: CPT

## 2019-11-21 RX ORDER — ALBUMIN (HUMAN) 12.5 G/50ML
25 SOLUTION INTRAVENOUS ONCE
Status: COMPLETED | OUTPATIENT
Start: 2019-11-21 | End: 2019-11-21

## 2019-11-21 RX ORDER — SODIUM CHLORIDE 9 MG/ML
INJECTION, SOLUTION INTRAVENOUS CONTINUOUS
Status: DISCONTINUED | OUTPATIENT
Start: 2019-11-21 | End: 2019-11-21 | Stop reason: HOSPADM

## 2019-11-21 RX ADMIN — SODIUM CHLORIDE: 9 INJECTION, SOLUTION INTRAVENOUS at 19:09

## 2019-11-21 RX ADMIN — ALBUMIN (HUMAN) 25 G: 0.25 INJECTION, SOLUTION INTRAVENOUS at 19:06

## 2019-11-21 RX ADMIN — Medication 6 UNITS: at 19:06

## 2019-11-21 ASSESSMENT — ENCOUNTER SYMPTOMS
VOMITING: 0
ABDOMINAL DISTENTION: 1
COUGH: 0
NAUSEA: 1
SORE THROAT: 0
SHORTNESS OF BREATH: 1
ABDOMINAL PAIN: 1
COLOR CHANGE: 0
WHEEZING: 0
BACK PAIN: 0

## 2019-11-21 ASSESSMENT — PAIN DESCRIPTION - DESCRIPTORS: DESCRIPTORS: PRESSURE

## 2019-11-21 ASSESSMENT — PAIN SCALES - GENERAL: PAINLEVEL_OUTOF10: 9

## 2019-11-21 ASSESSMENT — PAIN DESCRIPTION - ORIENTATION: ORIENTATION: MID

## 2019-11-21 ASSESSMENT — PAIN DESCRIPTION - LOCATION: LOCATION: ABDOMEN

## 2019-11-21 ASSESSMENT — PAIN DESCRIPTION - PAIN TYPE: TYPE: ACUTE PAIN

## 2019-11-21 ASSESSMENT — PAIN DESCRIPTION - FREQUENCY: FREQUENCY: CONTINUOUS

## 2019-11-22 LAB
BODY FLUID RBC: < 2000 /CUMM
CHARACTER, BODY FLUID: CLEAR
COLOR: NORMAL
MESOTHELIAL CELLS BODY FLUID: NORMAL
MONONUCLEAR CELLS BODY FLUID: 88.9 %
PATHOLOGIST REVIEW: NORMAL
POLYMORPHONUCLEAR CELLS BODY FLUID: 11.1 %
SPECIMEN: NORMAL
TOTAL NUCLEATED CELLS BODY FLUID: 72 /CUMM (ref 0–500)
TOTAL VOLUME RECEIVED BODY FLUID: 80 ML

## 2019-11-22 RX ORDER — LACTULOSE 10 G/15ML
20 SOLUTION ORAL 3 TIMES DAILY
Status: ON HOLD | COMMUNITY
End: 2019-12-11

## 2019-11-22 RX ORDER — SPIRONOLACTONE 50 MG/1
50 TABLET, FILM COATED ORAL 2 TIMES DAILY
Status: ON HOLD | COMMUNITY
End: 2019-12-11

## 2019-11-26 LAB
ANAEROBIC CULTURE: NORMAL
BODY FLUID CULTURE, STERILE: NORMAL
GRAM STAIN RESULT: NORMAL

## 2019-11-29 ENCOUNTER — HOSPITAL ENCOUNTER (OUTPATIENT)
Dept: ULTRASOUND IMAGING | Age: 59
Discharge: HOME | End: 2019-11-29
Payer: MEDICAID

## 2019-11-29 ENCOUNTER — HOSPITAL ENCOUNTER (OUTPATIENT)
Dept: NURSING | Age: 59
Discharge: HOME | End: 2019-11-29
Payer: MEDICAID

## 2019-11-29 VITALS
SYSTOLIC BLOOD PRESSURE: 118 MMHG | HEART RATE: 77 BPM | OXYGEN SATURATION: 98 % | TEMPERATURE: 97 F | DIASTOLIC BLOOD PRESSURE: 56 MMHG | RESPIRATION RATE: 18 BRPM

## 2019-11-29 DIAGNOSIS — K70.31 ALCOHOLIC CIRRHOSIS OF LIVER WITH ASCITES (HCC): ICD-10-CM

## 2019-11-29 LAB
BODY FLUID RBC: < 2000 /CUMM
CHARACTER, BODY FLUID: CLEAR
COLOR: YELLOW
MESOTHELIAL CELLS BODY FLUID: NORMAL
MONONUCLEAR CELLS BODY FLUID: 86.9 %
PATHOLOGIST REVIEW: NORMAL
POLYMORPHONUCLEAR CELLS BODY FLUID: 13.1 %
SPECIMEN: NORMAL
TOTAL NUCLEATED CELLS BODY FLUID: 92 /CUMM (ref 0–500)
TOTAL VOLUME RECEIVED BODY FLUID: 80 ML

## 2019-11-29 PROCEDURE — 87070 CULTURE OTHR SPECIMN AEROBIC: CPT

## 2019-11-29 PROCEDURE — 88305 TISSUE EXAM BY PATHOLOGIST: CPT

## 2019-11-29 PROCEDURE — 88112 CYTOPATH CELL ENHANCE TECH: CPT

## 2019-11-29 PROCEDURE — 2709999900 HC NON-CHARGEABLE SUPPLY

## 2019-11-29 PROCEDURE — 87205 SMEAR GRAM STAIN: CPT

## 2019-11-29 PROCEDURE — P9047 ALBUMIN (HUMAN), 25%, 50ML: HCPCS | Performed by: NURSE PRACTITIONER

## 2019-11-29 PROCEDURE — 49083 ABD PARACENTESIS W/IMAGING: CPT

## 2019-11-29 PROCEDURE — 96365 THER/PROPH/DIAG IV INF INIT: CPT

## 2019-11-29 PROCEDURE — 82042 OTHER SOURCE ALBUMIN QUAN EA: CPT

## 2019-11-29 PROCEDURE — 87075 CULTR BACTERIA EXCEPT BLOOD: CPT

## 2019-11-29 PROCEDURE — 84157 ASSAY OF PROTEIN OTHER: CPT

## 2019-11-29 PROCEDURE — 89050 BODY FLUID CELL COUNT: CPT

## 2019-11-29 PROCEDURE — 6360000002 HC RX W HCPCS: Performed by: NURSE PRACTITIONER

## 2019-11-29 RX ORDER — ALBUMIN (HUMAN) 12.5 G/50ML
25 SOLUTION INTRAVENOUS ONCE
Status: COMPLETED | OUTPATIENT
Start: 2019-11-29 | End: 2019-11-29

## 2019-11-29 RX ADMIN — ALBUMIN (HUMAN) 25 G: 0.25 INJECTION, SOLUTION INTRAVENOUS at 11:28

## 2019-11-29 ASSESSMENT — PAIN DESCRIPTION - DESCRIPTORS: DESCRIPTORS: SHARP

## 2019-11-29 ASSESSMENT — PAIN - FUNCTIONAL ASSESSMENT: PAIN_FUNCTIONAL_ASSESSMENT: 0-10

## 2019-11-29 NOTE — PROGRESS NOTES
1330:  PT DISCHARGED PER WC IN STABLE CONDITION.    __M__ Safety:       (Environmental)   [place] to environment   Ensure ID band is correct and in place/ allergy band as needed   Assess for fall risk   Initiate fall precautions as applicable (fall band, side rails, etc.)   Call light within reach   Bed in low position/ wheels locked    __M__ Pain:        Assess pain level and characteristics   Administer analgesics as ordered   Assess effectiveness of pain management and report to MD as needed    __M__ Knowledge Deficit:   Assess baseline knowledge   Provide teaching at level of understanding   Provide teaching via preferred learning method   Evaluate teaching effectiveness    _M___ Hemodynamic/Respiratory Status:       (Pre and Post Procedure Monitoring)   Assess/Monitor vital signs and LOC   Assess Baseline SpO2 prior to any sedation   Obtain weight/height   Assess vital signs/ LOC until patient meets discharge criteria   Monitor procedure site and notify MD of any issues    _

## 2019-11-29 NOTE — PROGRESS NOTES
1110:  ARRIVES VIA WC POST PARACENTESIS,  FOR IV ALBUMIN. PROCESS REVIEWED AND PT RIGHTS AND RESPONSIBILITIES OFFERED TO PT.  ASSISTED TO CART.   1150:  EATING LUNCH  1215:  TO RESTROOM

## 2019-11-30 LAB
ALBUMIN FLUID: 0.9 GM/DL
PROTEIN FLUID: 1.6 GM/DL

## 2019-12-04 LAB
ANAEROBIC CULTURE: NORMAL
BODY FLUID CULTURE, STERILE: NORMAL
GRAM STAIN RESULT: NORMAL

## 2019-12-06 ENCOUNTER — HOSPITAL ENCOUNTER (OUTPATIENT)
Dept: NURSING | Age: 59
Discharge: HOME | End: 2019-12-06
Payer: MEDICAID

## 2019-12-06 ENCOUNTER — HOSPITAL ENCOUNTER (OUTPATIENT)
Dept: ULTRASOUND IMAGING | Age: 59
Discharge: HOME | End: 2019-12-06
Payer: MEDICAID

## 2019-12-06 VITALS
RESPIRATION RATE: 18 BRPM | TEMPERATURE: 97.6 F | HEART RATE: 72 BPM | DIASTOLIC BLOOD PRESSURE: 55 MMHG | SYSTOLIC BLOOD PRESSURE: 100 MMHG

## 2019-12-06 DIAGNOSIS — K70.31 ALCOHOLIC CIRRHOSIS OF LIVER WITH ASCITES (HCC): ICD-10-CM

## 2019-12-06 LAB
ALBUMIN FLUID: 0.7 GM/DL
BODY FLUID RBC: < 2000 /CUMM
CHARACTER, BODY FLUID: CLEAR
COLOR: NORMAL
MESOTHELIAL CELLS BODY FLUID: NORMAL
MONONUCLEAR CELLS BODY FLUID: 86.9 %
PATHOLOGIST REVIEW: NORMAL
POLYMORPHONUCLEAR CELLS BODY FLUID: 13.1 %
PROTEIN FLUID: 1.3 GM/DL
SPECIMEN: NORMAL
TOTAL NUCLEATED CELLS BODY FLUID: 89 /CUMM (ref 0–500)
TOTAL VOLUME RECEIVED BODY FLUID: 80 ML

## 2019-12-06 PROCEDURE — 88305 TISSUE EXAM BY PATHOLOGIST: CPT

## 2019-12-06 PROCEDURE — 96365 THER/PROPH/DIAG IV INF INIT: CPT

## 2019-12-06 PROCEDURE — 87070 CULTURE OTHR SPECIMN AEROBIC: CPT

## 2019-12-06 PROCEDURE — 88112 CYTOPATH CELL ENHANCE TECH: CPT

## 2019-12-06 PROCEDURE — 87075 CULTR BACTERIA EXCEPT BLOOD: CPT

## 2019-12-06 PROCEDURE — 87205 SMEAR GRAM STAIN: CPT

## 2019-12-06 PROCEDURE — P9047 ALBUMIN (HUMAN), 25%, 50ML: HCPCS | Performed by: NURSE PRACTITIONER

## 2019-12-06 PROCEDURE — 49083 ABD PARACENTESIS W/IMAGING: CPT

## 2019-12-06 PROCEDURE — 2709999900 HC NON-CHARGEABLE SUPPLY

## 2019-12-06 PROCEDURE — 84157 ASSAY OF PROTEIN OTHER: CPT

## 2019-12-06 PROCEDURE — 89050 BODY FLUID CELL COUNT: CPT

## 2019-12-06 PROCEDURE — 6360000002 HC RX W HCPCS: Performed by: NURSE PRACTITIONER

## 2019-12-06 PROCEDURE — 82042 OTHER SOURCE ALBUMIN QUAN EA: CPT

## 2019-12-06 RX ORDER — ALBUMIN (HUMAN) 12.5 G/50ML
25 SOLUTION INTRAVENOUS ONCE
Status: COMPLETED | OUTPATIENT
Start: 2019-12-06 | End: 2019-12-06

## 2019-12-06 RX ADMIN — ALBUMIN (HUMAN) 25 G: 0.25 INJECTION, SOLUTION INTRAVENOUS at 11:05

## 2019-12-06 NOTE — PROGRESS NOTES
1202 INFUSION COMPLETE TOLERATED WELL.  HOME INSTRUCTIONS TO PT VERBALIZED UNDERSTANDING.  1205 DISCHARGED PER [address].    _MET___ Safety:       (Environmental)   [place] to environment   Ensure ID band is correct and in place/ allergy band as needed   Assess for fall risk   Initiate fall precautions as applicable (fall band, side rails, etc.)   Call light within reach   Bed in low position/ wheels locked    _MET___ Pain:        Assess pain level and characteristics   Administer analgesics as ordered   Assess effectiveness of pain management and report to MD as needed    __MET__ Knowledge Deficit:   Assess baseline knowledge   Provide teaching at level of understanding   Provide teaching via preferred learning method   Evaluate teaching effectiveness    __MET__ Hemodynamic/Respiratory Status:       (Pre and Post Procedure Monitoring)   Assess/Monitor vital signs and LOC      Assess vital signs/ LOC until patient meets discharge criteria   Monitor procedure site and notify MD of any issues    ___

## 2019-12-10 PROBLEM — E11.10 DKA, TYPE 2, NOT AT GOAL (HCC): Status: ACTIVE | Noted: 2019-12-10

## 2019-12-11 ENCOUNTER — APPOINTMENT (OUTPATIENT)
Dept: ULTRASOUND IMAGING | Age: 59
DRG: 280 | End: 2019-12-11
Attending: FAMILY MEDICINE
Payer: MEDICAID

## 2019-12-11 ENCOUNTER — HOSPITAL ENCOUNTER (INPATIENT)
Age: 59
LOS: 3 days | Discharge: HOME | DRG: 280 | End: 2019-12-14
Attending: FAMILY MEDICINE | Admitting: FAMILY MEDICINE
Payer: MEDICAID

## 2019-12-11 LAB
AMMONIA: 68 UMOL/L (ref 11–60)
ANAEROBIC CULTURE: NORMAL
ANION GAP SERPL CALCULATED.3IONS-SCNC: 13 MEQ/L (ref 8–16)
ANION GAP SERPL CALCULATED.3IONS-SCNC: 13 MEQ/L (ref 8–16)
ANION GAP SERPL CALCULATED.3IONS-SCNC: 16 MEQ/L (ref 8–16)
ANION GAP SERPL CALCULATED.3IONS-SCNC: 16 MEQ/L (ref 8–16)
ANION GAP SERPL CALCULATED.3IONS-SCNC: 17 MEQ/L (ref 8–16)
BASE EXCESS MIXED: -10.6 MMOL/L (ref -2–3)
BETA-HYDROXYBUTYRATE: 9.1 MG/DL (ref 0.2–2.81)
BODY FLUID CULTURE, STERILE: NORMAL
BUN BLDV-MCNC: 26 MG/DL (ref 7–22)
BUN BLDV-MCNC: 27 MG/DL (ref 7–22)
BUN BLDV-MCNC: 27 MG/DL (ref 7–22)
CALCIUM SERPL-MCNC: 8.2 MG/DL (ref 8.5–10.5)
CALCIUM SERPL-MCNC: 8.2 MG/DL (ref 8.5–10.5)
CALCIUM SERPL-MCNC: 8.5 MG/DL (ref 8.5–10.5)
CALCIUM SERPL-MCNC: 8.6 MG/DL (ref 8.5–10.5)
CALCIUM SERPL-MCNC: 8.7 MG/DL (ref 8.5–10.5)
CHLORIDE BLD-SCNC: 102 MEQ/L (ref 98–111)
CHLORIDE BLD-SCNC: 103 MEQ/L (ref 98–111)
CHLORIDE BLD-SCNC: 106 MEQ/L (ref 98–111)
CHLORIDE BLD-SCNC: 94 MEQ/L (ref 98–111)
CHLORIDE BLD-SCNC: 96 MEQ/L (ref 98–111)
CHLORIDE, URINE: < 20 MEQ/L
CO2: 11 MEQ/L (ref 23–33)
CO2: 13 MEQ/L (ref 23–33)
CO2: 14 MEQ/L (ref 23–33)
COLLECTED BY:: ABNORMAL
CREAT SERPL-MCNC: 2.5 MG/DL (ref 0.4–1.2)
CREAT SERPL-MCNC: 2.5 MG/DL (ref 0.4–1.2)
CREAT SERPL-MCNC: 2.6 MG/DL (ref 0.4–1.2)
CREAT SERPL-MCNC: 2.6 MG/DL (ref 0.4–1.2)
CREAT SERPL-MCNC: 2.8 MG/DL (ref 0.4–1.2)
CREATININE URINE: 159.6 MG/DL
ERYTHROCYTE [DISTWIDTH] IN BLOOD BY AUTOMATED COUNT: 13.5 % (ref 11.5–14.5)
ERYTHROCYTE [DISTWIDTH] IN BLOOD BY AUTOMATED COUNT: 46.5 FL (ref 35–45)
GFR SERPL CREATININE-BSD FRML MDRD: 17 ML/MIN/1.73M2
GFR SERPL CREATININE-BSD FRML MDRD: 19 ML/MIN/1.73M2
GFR SERPL CREATININE-BSD FRML MDRD: 19 ML/MIN/1.73M2
GFR SERPL CREATININE-BSD FRML MDRD: 20 ML/MIN/1.73M2
GFR SERPL CREATININE-BSD FRML MDRD: 20 ML/MIN/1.73M2
GLUCOSE BLD-MCNC: 175 MG/DL (ref 70–108)
GLUCOSE BLD-MCNC: 175 MG/DL (ref 70–108)
GLUCOSE BLD-MCNC: 177 MG/DL (ref 70–108)
GLUCOSE BLD-MCNC: 181 MG/DL (ref 70–108)
GLUCOSE BLD-MCNC: 198 MG/DL (ref 70–108)
GLUCOSE BLD-MCNC: 198 MG/DL (ref 70–108)
GLUCOSE BLD-MCNC: 200 MG/DL (ref 70–108)
GLUCOSE BLD-MCNC: 216 MG/DL (ref 70–108)
GLUCOSE BLD-MCNC: 270 MG/DL (ref 70–108)
GLUCOSE BLD-MCNC: 330 MG/DL (ref 70–108)
GLUCOSE BLD-MCNC: 373 MG/DL (ref 70–108)
GLUCOSE BLD-MCNC: 375 MG/DL (ref 70–108)
GLUCOSE BLD-MCNC: 443 MG/DL (ref 70–108)
GLUCOSE BLD-MCNC: 542 MG/DL (ref 70–108)
GLUCOSE BLD-MCNC: 558 MG/DL (ref 70–108)
GLUCOSE BLD-MCNC: 604 MG/DL (ref 70–108)
GLUCOSE BLD-MCNC: 631 MG/DL (ref 70–108)
GLUCOSE BLD-MCNC: > 600 MG/DL (ref 70–108)
GRAM STAIN RESULT: NORMAL
HCO3, MIXED: 15 MMOL/L (ref 23–28)
HCT VFR BLD CALC: 36 % (ref 37–47)
HEMOGLOBIN: 12 GM/DL (ref 12–16)
INR BLD: 1.2 (ref 0.85–1.13)
LACTIC ACID: 2.3 MMOL/L (ref 0.5–2.2)
LACTIC ACID: 2.4 MMOL/L (ref 0.5–2.2)
LACTIC ACID: 2.9 MMOL/L (ref 0.5–2.2)
LACTIC ACID: 4.4 MMOL/L (ref 0.5–2.2)
MAGNESIUM: 1.9 MG/DL (ref 1.6–2.4)
MAGNESIUM: 2 MG/DL (ref 1.6–2.4)
MAGNESIUM: 2 MG/DL (ref 1.6–2.4)
MCH RBC QN AUTO: 31.5 PG (ref 26–33)
MCHC RBC AUTO-ENTMCNC: 33.3 GM/DL (ref 32.2–35.5)
MCV RBC AUTO: 94.5 FL (ref 81–99)
MRSA SCREEN RT-PCR: NEGATIVE
O2 SAT, MIXED: 67 %
PCO2, MIXED VENOUS: 31 MMHG (ref 41–51)
PH, MIXED: 7.29 (ref 7.31–7.41)
PHOSPHORUS: 2.2 MG/DL (ref 2.4–4.7)
PHOSPHORUS: 2.3 MG/DL (ref 2.4–4.7)
PHOSPHORUS: 2.8 MG/DL (ref 2.4–4.7)
PLATELET # BLD: 84 THOU/MM3 (ref 130–400)
PMV BLD AUTO: 10.7 FL (ref 9.4–12.4)
PO2 MIXED: 38 MMHG (ref 25–40)
POTASSIUM SERPL-SCNC: 4.8 MEQ/L (ref 3.5–5.2)
POTASSIUM SERPL-SCNC: 5 MEQ/L (ref 3.5–5.2)
POTASSIUM SERPL-SCNC: 5.1 MEQ/L (ref 3.5–5.2)
POTASSIUM SERPL-SCNC: 5.4 MEQ/L (ref 3.5–5.2)
POTASSIUM SERPL-SCNC: 5.8 MEQ/L (ref 3.5–5.2)
POTASSIUM, URINE: 22 MEQ/L
RBC # BLD: 3.81 MILL/MM3 (ref 4.2–5.4)
SODIUM BLD-SCNC: 123 MEQ/L (ref 135–145)
SODIUM BLD-SCNC: 125 MEQ/L (ref 135–145)
SODIUM BLD-SCNC: 129 MEQ/L (ref 135–145)
SODIUM BLD-SCNC: 132 MEQ/L (ref 135–145)
SODIUM BLD-SCNC: 133 MEQ/L (ref 135–145)
SODIUM URINE: 23 MEQ/L
UREA NITROGEN, UR: 235 MG/DL
VANCOMYCIN RESISTANT ENTEROCOCCUS: NEGATIVE
WBC # BLD: 7.7 THOU/MM3 (ref 4.8–10.8)

## 2019-12-11 PROCEDURE — 36415 COLL VENOUS BLD VENIPUNCTURE: CPT

## 2019-12-11 PROCEDURE — 89050 BODY FLUID CELL COUNT: CPT

## 2019-12-11 PROCEDURE — 84100 ASSAY OF PHOSPHORUS: CPT

## 2019-12-11 PROCEDURE — 82436 ASSAY OF URINE CHLORIDE: CPT

## 2019-12-11 PROCEDURE — 87500 VANOMYCIN DNA AMP PROBE: CPT

## 2019-12-11 PROCEDURE — 87075 CULTR BACTERIA EXCEPT BLOOD: CPT

## 2019-12-11 PROCEDURE — 81001 URINALYSIS AUTO W/SCOPE: CPT

## 2019-12-11 PROCEDURE — 82042 OTHER SOURCE ALBUMIN QUAN EA: CPT

## 2019-12-11 PROCEDURE — 99999 PR OFFICE/OUTPT VISIT,PROCEDURE ONLY: CPT | Performed by: INTERNAL MEDICINE

## 2019-12-11 PROCEDURE — 88112 CYTOPATH CELL ENHANCE TECH: CPT

## 2019-12-11 PROCEDURE — 85610 PROTHROMBIN TIME: CPT

## 2019-12-11 PROCEDURE — 82150 ASSAY OF AMYLASE: CPT

## 2019-12-11 PROCEDURE — 82140 ASSAY OF AMMONIA: CPT

## 2019-12-11 PROCEDURE — 6360000002 HC RX W HCPCS: Performed by: FAMILY MEDICINE

## 2019-12-11 PROCEDURE — 84133 ASSAY OF URINE POTASSIUM: CPT

## 2019-12-11 PROCEDURE — 88305 TISSUE EXAM BY PATHOLOGIST: CPT

## 2019-12-11 PROCEDURE — 82948 REAGENT STRIP/BLOOD GLUCOSE: CPT

## 2019-12-11 PROCEDURE — 82010 KETONE BODYS QUAN: CPT

## 2019-12-11 PROCEDURE — 2580000003 HC RX 258: Performed by: FAMILY MEDICINE

## 2019-12-11 PROCEDURE — 84540 ASSAY OF URINE/UREA-N: CPT

## 2019-12-11 PROCEDURE — 87081 CULTURE SCREEN ONLY: CPT

## 2019-12-11 PROCEDURE — 83735 ASSAY OF MAGNESIUM: CPT

## 2019-12-11 PROCEDURE — 87205 SMEAR GRAM STAIN: CPT

## 2019-12-11 PROCEDURE — 6370000000 HC RX 637 (ALT 250 FOR IP): Performed by: INTERNAL MEDICINE

## 2019-12-11 PROCEDURE — 80048 BASIC METABOLIC PNL TOTAL CA: CPT

## 2019-12-11 PROCEDURE — 83605 ASSAY OF LACTIC ACID: CPT

## 2019-12-11 PROCEDURE — 0W9G3ZX DRAINAGE OF PERITONEAL CAVITY, PERCUTANEOUS APPROACH, DIAGNOSTIC: ICD-10-PCS | Performed by: RADIOLOGY

## 2019-12-11 PROCEDURE — 2060000000 HC ICU INTERMEDIATE R&B

## 2019-12-11 PROCEDURE — 85027 COMPLETE CBC AUTOMATED: CPT

## 2019-12-11 PROCEDURE — 82570 ASSAY OF URINE CREATININE: CPT

## 2019-12-11 PROCEDURE — 6360000002 HC RX W HCPCS: Performed by: INTERNAL MEDICINE

## 2019-12-11 PROCEDURE — 84300 ASSAY OF URINE SODIUM: CPT

## 2019-12-11 PROCEDURE — 87641 MR-STAPH DNA AMP PROBE: CPT

## 2019-12-11 PROCEDURE — 99223 1ST HOSP IP/OBS HIGH 75: CPT | Performed by: FAMILY MEDICINE

## 2019-12-11 PROCEDURE — P9047 ALBUMIN (HUMAN), 25%, 50ML: HCPCS | Performed by: INTERNAL MEDICINE

## 2019-12-11 PROCEDURE — 94760 N-INVAS EAR/PLS OXIMETRY 1: CPT

## 2019-12-11 PROCEDURE — 6370000000 HC RX 637 (ALT 250 FOR IP): Performed by: FAMILY MEDICINE

## 2019-12-11 PROCEDURE — 87070 CULTURE OTHR SPECIMN AEROBIC: CPT

## 2019-12-11 PROCEDURE — 2709999900 HC NON-CHARGEABLE SUPPLY

## 2019-12-11 PROCEDURE — 49083 ABD PARACENTESIS W/IMAGING: CPT

## 2019-12-11 PROCEDURE — 2500000003 HC RX 250 WO HCPCS: Performed by: FAMILY MEDICINE

## 2019-12-11 RX ORDER — MIDODRINE HYDROCHLORIDE 5 MG/1
5 TABLET ORAL
Status: DISCONTINUED | OUTPATIENT
Start: 2019-12-11 | End: 2019-12-13

## 2019-12-11 RX ORDER — ONDANSETRON 2 MG/ML
4 INJECTION INTRAMUSCULAR; INTRAVENOUS EVERY 6 HOURS PRN
Status: DISCONTINUED | OUTPATIENT
Start: 2019-12-11 | End: 2019-12-15 | Stop reason: HOSPADM

## 2019-12-11 RX ORDER — 0.9 % SODIUM CHLORIDE 0.9 %
15 INTRAVENOUS SOLUTION INTRAVENOUS ONCE
Status: COMPLETED | OUTPATIENT
Start: 2019-12-11 | End: 2019-12-11

## 2019-12-11 RX ORDER — 0.9 % SODIUM CHLORIDE 0.9 %
1000 INTRAVENOUS SOLUTION INTRAVENOUS ONCE
Status: DISCONTINUED | OUTPATIENT
Start: 2019-12-11 | End: 2019-12-11

## 2019-12-11 RX ORDER — SODIUM CHLORIDE 0.9 % (FLUSH) 0.9 %
10 SYRINGE (ML) INJECTION EVERY 12 HOURS SCHEDULED
Status: DISCONTINUED | OUTPATIENT
Start: 2019-12-11 | End: 2019-12-15 | Stop reason: HOSPADM

## 2019-12-11 RX ORDER — FUROSEMIDE 40 MG/1
40 TABLET ORAL DAILY
Status: DISCONTINUED | OUTPATIENT
Start: 2019-12-11 | End: 2019-12-14

## 2019-12-11 RX ORDER — INSULIN GLARGINE 100 [IU]/ML
30 INJECTION, SOLUTION SUBCUTANEOUS 2 TIMES DAILY
Status: DISCONTINUED | OUTPATIENT
Start: 2019-12-11 | End: 2019-12-15 | Stop reason: HOSPADM

## 2019-12-11 RX ORDER — ALBUMIN (HUMAN) 12.5 G/50ML
25 SOLUTION INTRAVENOUS
Status: COMPLETED | OUTPATIENT
Start: 2019-12-11 | End: 2019-12-11

## 2019-12-11 RX ORDER — DEXTROSE AND SODIUM CHLORIDE 5; .45 G/100ML; G/100ML
INJECTION, SOLUTION INTRAVENOUS CONTINUOUS PRN
Status: DISCONTINUED | OUTPATIENT
Start: 2019-12-11 | End: 2019-12-11

## 2019-12-11 RX ORDER — ONDANSETRON 4 MG/1
4 TABLET, ORALLY DISINTEGRATING ORAL EVERY 8 HOURS PRN
Status: DISCONTINUED | OUTPATIENT
Start: 2019-12-11 | End: 2019-12-15 | Stop reason: HOSPADM

## 2019-12-11 RX ORDER — DEXTROSE MONOHYDRATE 50 MG/ML
100 INJECTION, SOLUTION INTRAVENOUS PRN
Status: DISCONTINUED | OUTPATIENT
Start: 2019-12-11 | End: 2019-12-15 | Stop reason: HOSPADM

## 2019-12-11 RX ORDER — CALCIUM GLUCONATE 94 MG/ML
1 INJECTION, SOLUTION INTRAVENOUS ONCE
Status: COMPLETED | OUTPATIENT
Start: 2019-12-11 | End: 2019-12-11

## 2019-12-11 RX ORDER — SODIUM CHLORIDE 0.9 % (FLUSH) 0.9 %
10 SYRINGE (ML) INJECTION PRN
Status: DISCONTINUED | OUTPATIENT
Start: 2019-12-11 | End: 2019-12-15 | Stop reason: HOSPADM

## 2019-12-11 RX ORDER — LACTULOSE 10 G/15ML
20 SOLUTION ORAL 3 TIMES DAILY
Status: DISCONTINUED | OUTPATIENT
Start: 2019-12-11 | End: 2019-12-11

## 2019-12-11 RX ORDER — DEXTROSE MONOHYDRATE 25 G/50ML
12.5 INJECTION, SOLUTION INTRAVENOUS PRN
Status: DISCONTINUED | OUTPATIENT
Start: 2019-12-11 | End: 2019-12-15 | Stop reason: HOSPADM

## 2019-12-11 RX ORDER — LEVOTHYROXINE SODIUM 0.15 MG/1
300 TABLET ORAL DAILY
Status: DISCONTINUED | OUTPATIENT
Start: 2019-12-11 | End: 2019-12-15 | Stop reason: HOSPADM

## 2019-12-11 RX ORDER — SODIUM CHLORIDE 9 MG/ML
INJECTION, SOLUTION INTRAVENOUS CONTINUOUS
Status: DISCONTINUED | OUTPATIENT
Start: 2019-12-11 | End: 2019-12-13

## 2019-12-11 RX ORDER — NICOTINE POLACRILEX 4 MG
15 LOZENGE BUCCAL PRN
Status: DISCONTINUED | OUTPATIENT
Start: 2019-12-11 | End: 2019-12-15 | Stop reason: HOSPADM

## 2019-12-11 RX ORDER — SPIRONOLACTONE 25 MG/1
50 TABLET ORAL 2 TIMES DAILY
Status: DISCONTINUED | OUTPATIENT
Start: 2019-12-11 | End: 2019-12-15 | Stop reason: HOSPADM

## 2019-12-11 RX ADMIN — Medication 15.8 UNITS/HR: at 08:47

## 2019-12-11 RX ADMIN — ONDANSETRON 4 MG: 2 INJECTION INTRAMUSCULAR; INTRAVENOUS at 10:02

## 2019-12-11 RX ADMIN — ALBUMIN (HUMAN) 25 G: 0.25 INJECTION, SOLUTION INTRAVENOUS at 18:35

## 2019-12-11 RX ADMIN — SODIUM CHLORIDE: 9 INJECTION, SOLUTION INTRAVENOUS at 05:39

## 2019-12-11 RX ADMIN — DEXTROSE AND SODIUM CHLORIDE: 5; 450 INJECTION, SOLUTION INTRAVENOUS at 11:29

## 2019-12-11 RX ADMIN — CALCIUM GLUCONATE 1 G: 98 INJECTION, SOLUTION INTRAVENOUS at 06:03

## 2019-12-11 RX ADMIN — LEVOTHYROXINE SODIUM 300 MCG: 150 TABLET ORAL at 06:22

## 2019-12-11 RX ADMIN — Medication 0.5 UNITS/HR: at 03:19

## 2019-12-11 RX ADMIN — SODIUM BICARBONATE: 84 INJECTION, SOLUTION INTRAVENOUS at 05:50

## 2019-12-11 RX ADMIN — SODIUM CHLORIDE, PRESERVATIVE FREE 10 ML: 5 INJECTION INTRAVENOUS at 22:49

## 2019-12-11 RX ADMIN — SODIUM CHLORIDE 1000 ML: 9 INJECTION, SOLUTION INTRAVENOUS at 02:42

## 2019-12-11 RX ADMIN — INSULIN GLARGINE 30 UNITS: 100 INJECTION, SOLUTION SUBCUTANEOUS at 16:54

## 2019-12-11 RX ADMIN — SERTRALINE 50 MG: 50 TABLET, FILM COATED ORAL at 09:08

## 2019-12-11 RX ADMIN — SODIUM CHLORIDE 551 ML: 9 INJECTION, SOLUTION INTRAVENOUS at 04:20

## 2019-12-11 RX ADMIN — ALBUMIN (HUMAN) 25 G: 0.25 INJECTION, SOLUTION INTRAVENOUS at 17:40

## 2019-12-11 RX ADMIN — SODIUM CHLORIDE: 9 INJECTION, SOLUTION INTRAVENOUS at 07:00

## 2019-12-11 RX ADMIN — MIDODRINE HYDROCHLORIDE 5 MG: 5 TABLET ORAL at 17:44

## 2019-12-11 ASSESSMENT — PAIN DESCRIPTION - LOCATION
LOCATION: ABDOMEN
LOCATION_2: ABDOMEN
LOCATION_2: ABDOMEN
LOCATION: RIB CAGE
LOCATION: ABDOMEN
LOCATION: RIB CAGE

## 2019-12-11 ASSESSMENT — PAIN DESCRIPTION - INTENSITY
RATING_2: 10
RATING_2: 10

## 2019-12-11 ASSESSMENT — PAIN SCALES - GENERAL
PAINLEVEL_OUTOF10: 10
PAINLEVEL_OUTOF10: 7
PAINLEVEL_OUTOF10: 6
PAINLEVEL_OUTOF10: 10
PAINLEVEL_OUTOF10: 2

## 2019-12-11 ASSESSMENT — PAIN DESCRIPTION - DURATION
DURATION_2: INTERMITTENT
DURATION_2: INTERMITTENT

## 2019-12-11 ASSESSMENT — PAIN DESCRIPTION - ORIENTATION
ORIENTATION_2: UPPER
ORIENTATION: LEFT
ORIENTATION_2: UPPER
ORIENTATION: LEFT
ORIENTATION: LEFT

## 2019-12-11 ASSESSMENT — PAIN DESCRIPTION - FREQUENCY
FREQUENCY: INTERMITTENT
FREQUENCY: INTERMITTENT

## 2019-12-11 ASSESSMENT — PAIN - FUNCTIONAL ASSESSMENT
PAIN_FUNCTIONAL_ASSESSMENT: PREVENTS OR INTERFERES SOME ACTIVE ACTIVITIES AND ADLS
PAIN_FUNCTIONAL_ASSESSMENT: PREVENTS OR INTERFERES SOME ACTIVE ACTIVITIES AND ADLS

## 2019-12-11 ASSESSMENT — PAIN DESCRIPTION - DESCRIPTORS
DESCRIPTORS_2: JABBING
DESCRIPTORS_2: JABBING
DESCRIPTORS: SHARP
DESCRIPTORS: SHARP

## 2019-12-11 ASSESSMENT — PAIN DESCRIPTION - ONSET
ONSET: ON-GOING
ONSET: ON-GOING

## 2019-12-12 LAB
ALBUMIN FLUID: 0.7 GM/DL
AMYLASE FLUID: 8 U/L
ANION GAP SERPL CALCULATED.3IONS-SCNC: 10 MEQ/L (ref 8–16)
ANION GAP SERPL CALCULATED.3IONS-SCNC: 11 MEQ/L (ref 8–16)
BACTERIA: ABNORMAL
BASOPHILS # BLD: 0.2 %
BASOPHILS ABSOLUTE: 0 THOU/MM3 (ref 0–0.1)
BILIRUBIN URINE: NEGATIVE
BLOOD, URINE: ABNORMAL
BODY FLUID RBC: < 2000 /CUMM
BUN BLDV-MCNC: 25 MG/DL (ref 7–22)
BUN BLDV-MCNC: 27 MG/DL (ref 7–22)
CALCIUM SERPL-MCNC: 8.1 MG/DL (ref 8.5–10.5)
CALCIUM SERPL-MCNC: 8.2 MG/DL (ref 8.5–10.5)
CASTS: ABNORMAL /LPF
CASTS: ABNORMAL /LPF
CHARACTER, BODY FLUID: CLEAR
CHARACTER, URINE: CLEAR
CHLORIDE BLD-SCNC: 101 MEQ/L (ref 98–111)
CHLORIDE BLD-SCNC: 105 MEQ/L (ref 98–111)
CO2: 15 MEQ/L (ref 23–33)
CO2: 18 MEQ/L (ref 23–33)
COLOR: YELLOW
COLOR: YELLOW
CREAT SERPL-MCNC: 2.2 MG/DL (ref 0.4–1.2)
CREAT SERPL-MCNC: 2.3 MG/DL (ref 0.4–1.2)
CRYSTALS: ABNORMAL
EOSINOPHIL # BLD: 2.9 %
EOSINOPHILS ABSOLUTE: 0.2 THOU/MM3 (ref 0–0.4)
EPITHELIAL CELLS, UA: ABNORMAL /HPF
ERYTHROCYTE [DISTWIDTH] IN BLOOD BY AUTOMATED COUNT: 13.6 % (ref 11.5–14.5)
ERYTHROCYTE [DISTWIDTH] IN BLOOD BY AUTOMATED COUNT: 46.8 FL (ref 35–45)
GFR SERPL CREATININE-BSD FRML MDRD: 22 ML/MIN/1.73M2
GFR SERPL CREATININE-BSD FRML MDRD: 23 ML/MIN/1.73M2
GLUCOSE BLD-MCNC: 276 MG/DL (ref 70–108)
GLUCOSE BLD-MCNC: 281 MG/DL (ref 70–108)
GLUCOSE BLD-MCNC: 282 MG/DL (ref 70–108)
GLUCOSE BLD-MCNC: 293 MG/DL (ref 70–108)
GLUCOSE BLD-MCNC: 297 MG/DL (ref 70–108)
GLUCOSE BLD-MCNC: 311 MG/DL (ref 70–108)
GLUCOSE BLD-MCNC: 402 MG/DL (ref 70–108)
GLUCOSE, URINE: NEGATIVE MG/DL
HCT VFR BLD CALC: 31.3 % (ref 37–47)
HEMOGLOBIN: 10.5 GM/DL (ref 12–16)
IMMATURE GRANS (ABS): 0.05 THOU/MM3 (ref 0–0.07)
IMMATURE GRANULOCYTES: 0.8 %
KETONES, URINE: NEGATIVE
LACTIC ACID: 1.4 MMOL/L (ref 0.5–2.2)
LEUKOCYTE ESTERASE, URINE: ABNORMAL
LYMPHOCYTES # BLD: 7.2 %
LYMPHOCYTES ABSOLUTE: 0.5 THOU/MM3 (ref 1–4.8)
MCH RBC QN AUTO: 31.4 PG (ref 26–33)
MCHC RBC AUTO-ENTMCNC: 33.5 GM/DL (ref 32.2–35.5)
MCV RBC AUTO: 93.7 FL (ref 81–99)
MESOTHELIAL CELLS BODY FLUID: NORMAL
MISCELLANEOUS LAB TEST RESULT: ABNORMAL
MONOCYTES # BLD: 7.8 %
MONOCYTES ABSOLUTE: 0.5 THOU/MM3 (ref 0.4–1.3)
MONONUCLEAR CELLS BODY FLUID: 91.5 %
NITRITE, URINE: NEGATIVE
NUCLEATED RED BLOOD CELLS: 0 /100 WBC
PATHOLOGIST REVIEW: NORMAL
PH UA: 5.5 (ref 5–9)
PLATELET # BLD: 76 THOU/MM3 (ref 130–400)
PMV BLD AUTO: 10.3 FL (ref 9.4–12.4)
POLYMORPHONUCLEAR CELLS BODY FLUID: 8.5 %
POTASSIUM SERPL-SCNC: 4.8 MEQ/L (ref 3.5–5.2)
POTASSIUM SERPL-SCNC: 5.1 MEQ/L (ref 3.5–5.2)
PROTEIN UA: NEGATIVE MG/DL
RBC # BLD: 3.34 MILL/MM3 (ref 4.2–5.4)
RBC URINE: ABNORMAL /HPF
RENAL EPITHELIAL, UA: ABNORMAL
SEG NEUTROPHILS: 81.1 %
SEGMENTED NEUTROPHILS ABSOLUTE COUNT: 5.3 THOU/MM3 (ref 1.8–7.7)
SODIUM BLD-SCNC: 130 MEQ/L (ref 135–145)
SODIUM BLD-SCNC: 130 MEQ/L (ref 135–145)
SPECIFIC GRAVITY UA: 1.01 (ref 1–1.03)
SPECIMEN: NORMAL
TOTAL NUCLEATED CELLS BODY FLUID: 69 /CUMM (ref 0–500)
TOTAL VOLUME RECEIVED BODY FLUID: 75 ML
UROBILINOGEN, URINE: 0.2 EU/DL (ref 0–1)
WBC # BLD: 6.5 THOU/MM3 (ref 4.8–10.8)
WBC UA: ABNORMAL /HPF
YEAST: ABNORMAL

## 2019-12-12 PROCEDURE — 2709999900 HC NON-CHARGEABLE SUPPLY

## 2019-12-12 PROCEDURE — 2500000003 HC RX 250 WO HCPCS: Performed by: INTERNAL MEDICINE

## 2019-12-12 PROCEDURE — 2580000003 HC RX 258: Performed by: FAMILY MEDICINE

## 2019-12-12 PROCEDURE — 82948 REAGENT STRIP/BLOOD GLUCOSE: CPT

## 2019-12-12 PROCEDURE — 1200000003 HC TELEMETRY R&B

## 2019-12-12 PROCEDURE — 6370000000 HC RX 637 (ALT 250 FOR IP): Performed by: FAMILY MEDICINE

## 2019-12-12 PROCEDURE — 6360000002 HC RX W HCPCS: Performed by: INTERNAL MEDICINE

## 2019-12-12 PROCEDURE — 6370000000 HC RX 637 (ALT 250 FOR IP): Performed by: INTERNAL MEDICINE

## 2019-12-12 PROCEDURE — 80048 BASIC METABOLIC PNL TOTAL CA: CPT

## 2019-12-12 PROCEDURE — 36415 COLL VENOUS BLD VENIPUNCTURE: CPT

## 2019-12-12 PROCEDURE — 2580000003 HC RX 258: Performed by: INTERNAL MEDICINE

## 2019-12-12 PROCEDURE — 99232 SBSQ HOSP IP/OBS MODERATE 35: CPT | Performed by: INTERNAL MEDICINE

## 2019-12-12 PROCEDURE — 85025 COMPLETE CBC W/AUTO DIFF WBC: CPT

## 2019-12-12 RX ORDER — HEPARIN SODIUM 5000 [USP'U]/ML
5000 INJECTION, SOLUTION INTRAVENOUS; SUBCUTANEOUS EVERY 8 HOURS SCHEDULED
Status: DISCONTINUED | OUTPATIENT
Start: 2019-12-12 | End: 2019-12-12

## 2019-12-12 RX ORDER — LACTULOSE 10 G/15ML
20 SOLUTION ORAL DAILY
Status: DISCONTINUED | OUTPATIENT
Start: 2019-12-12 | End: 2019-12-15 | Stop reason: HOSPADM

## 2019-12-12 RX ADMIN — INSULIN LISPRO 12 UNITS: 100 INJECTION, SOLUTION INTRAVENOUS; SUBCUTANEOUS at 12:34

## 2019-12-12 RX ADMIN — LACTULOSE 20 G: 20 SOLUTION ORAL at 09:58

## 2019-12-12 RX ADMIN — SERTRALINE 50 MG: 50 TABLET, FILM COATED ORAL at 09:57

## 2019-12-12 RX ADMIN — SODIUM BICARBONATE: 84 INJECTION, SOLUTION INTRAVENOUS at 07:24

## 2019-12-12 RX ADMIN — INSULIN LISPRO 6 UNITS: 100 INJECTION, SOLUTION INTRAVENOUS; SUBCUTANEOUS at 09:57

## 2019-12-12 RX ADMIN — INSULIN GLARGINE 30 UNITS: 100 INJECTION, SOLUTION SUBCUTANEOUS at 09:57

## 2019-12-12 RX ADMIN — MIDODRINE HYDROCHLORIDE 5 MG: 5 TABLET ORAL at 12:38

## 2019-12-12 RX ADMIN — ONDANSETRON 4 MG: 2 INJECTION INTRAMUSCULAR; INTRAVENOUS at 16:13

## 2019-12-12 RX ADMIN — MIDODRINE HYDROCHLORIDE 5 MG: 5 TABLET ORAL at 09:57

## 2019-12-12 RX ADMIN — INSULIN GLARGINE 30 UNITS: 100 INJECTION, SOLUTION SUBCUTANEOUS at 21:32

## 2019-12-12 RX ADMIN — LEVOTHYROXINE SODIUM 300 MCG: 150 TABLET ORAL at 05:37

## 2019-12-12 RX ADMIN — SODIUM CHLORIDE, PRESERVATIVE FREE 10 ML: 5 INJECTION INTRAVENOUS at 21:31

## 2019-12-12 RX ADMIN — INSULIN LISPRO 6 UNITS: 100 INJECTION, SOLUTION INTRAVENOUS; SUBCUTANEOUS at 18:05

## 2019-12-12 ASSESSMENT — PAIN DESCRIPTION - PAIN TYPE: TYPE: ACUTE PAIN

## 2019-12-12 ASSESSMENT — PAIN DESCRIPTION - LOCATION: LOCATION: ABDOMEN

## 2019-12-12 ASSESSMENT — PAIN DESCRIPTION - ORIENTATION: ORIENTATION: UPPER

## 2019-12-12 ASSESSMENT — PAIN SCALES - GENERAL: PAINLEVEL_OUTOF10: 6

## 2019-12-13 ENCOUNTER — APPOINTMENT (OUTPATIENT)
Dept: ULTRASOUND IMAGING | Age: 59
DRG: 280 | End: 2019-12-13
Attending: FAMILY MEDICINE
Payer: MEDICAID

## 2019-12-13 LAB
ANION GAP SERPL CALCULATED.3IONS-SCNC: 14 MEQ/L (ref 8–16)
BUN BLDV-MCNC: 25 MG/DL (ref 7–22)
CALCIUM SERPL-MCNC: 8.1 MG/DL (ref 8.5–10.5)
CHLORIDE BLD-SCNC: 95 MEQ/L (ref 98–111)
CO2: 22 MEQ/L (ref 23–33)
CREAT SERPL-MCNC: 2.3 MG/DL (ref 0.4–1.2)
GFR SERPL CREATININE-BSD FRML MDRD: 22 ML/MIN/1.73M2
GLUCOSE BLD-MCNC: 104 MG/DL (ref 70–108)
GLUCOSE BLD-MCNC: 123 MG/DL (ref 70–108)
GLUCOSE BLD-MCNC: 270 MG/DL (ref 70–108)
GLUCOSE BLD-MCNC: 284 MG/DL (ref 70–108)
GLUCOSE BLD-MCNC: 286 MG/DL (ref 70–108)
MAGNESIUM: 1.9 MG/DL (ref 1.6–2.4)
MRSA SCREEN: NORMAL
POTASSIUM SERPL-SCNC: 4 MEQ/L (ref 3.5–5.2)
SODIUM BLD-SCNC: 131 MEQ/L (ref 135–145)
SODIUM URINE: < 20 MEQ/L

## 2019-12-13 PROCEDURE — 6370000000 HC RX 637 (ALT 250 FOR IP): Performed by: INTERNAL MEDICINE

## 2019-12-13 PROCEDURE — 80048 BASIC METABOLIC PNL TOTAL CA: CPT

## 2019-12-13 PROCEDURE — 82948 REAGENT STRIP/BLOOD GLUCOSE: CPT

## 2019-12-13 PROCEDURE — 2709999900 HC NON-CHARGEABLE SUPPLY

## 2019-12-13 PROCEDURE — 97166 OT EVAL MOD COMPLEX 45 MIN: CPT

## 2019-12-13 PROCEDURE — 0W9G3ZX DRAINAGE OF PERITONEAL CAVITY, PERCUTANEOUS APPROACH, DIAGNOSTIC: ICD-10-PCS | Performed by: RADIOLOGY

## 2019-12-13 PROCEDURE — 2500000003 HC RX 250 WO HCPCS: Performed by: INTERNAL MEDICINE

## 2019-12-13 PROCEDURE — 84300 ASSAY OF URINE SODIUM: CPT

## 2019-12-13 PROCEDURE — P9047 ALBUMIN (HUMAN), 25%, 50ML: HCPCS | Performed by: INTERNAL MEDICINE

## 2019-12-13 PROCEDURE — 36415 COLL VENOUS BLD VENIPUNCTURE: CPT

## 2019-12-13 PROCEDURE — 1200000003 HC TELEMETRY R&B

## 2019-12-13 PROCEDURE — 6360000002 HC RX W HCPCS: Performed by: INTERNAL MEDICINE

## 2019-12-13 PROCEDURE — 6370000000 HC RX 637 (ALT 250 FOR IP): Performed by: FAMILY MEDICINE

## 2019-12-13 PROCEDURE — 2580000003 HC RX 258: Performed by: INTERNAL MEDICINE

## 2019-12-13 PROCEDURE — 49083 ABD PARACENTESIS W/IMAGING: CPT

## 2019-12-13 PROCEDURE — 2580000003 HC RX 258: Performed by: FAMILY MEDICINE

## 2019-12-13 PROCEDURE — 97162 PT EVAL MOD COMPLEX 30 MIN: CPT

## 2019-12-13 PROCEDURE — 97535 SELF CARE MNGMENT TRAINING: CPT

## 2019-12-13 PROCEDURE — 83735 ASSAY OF MAGNESIUM: CPT

## 2019-12-13 PROCEDURE — 51798 US URINE CAPACITY MEASURE: CPT

## 2019-12-13 PROCEDURE — 99232 SBSQ HOSP IP/OBS MODERATE 35: CPT | Performed by: INTERNAL MEDICINE

## 2019-12-13 RX ORDER — MIDODRINE HYDROCHLORIDE 10 MG/1
10 TABLET ORAL
Status: DISCONTINUED | OUTPATIENT
Start: 2019-12-13 | End: 2019-12-15 | Stop reason: HOSPADM

## 2019-12-13 RX ORDER — SPIRONOLACTONE 50 MG/1
50 TABLET, FILM COATED ORAL DAILY
Status: ON HOLD | COMMUNITY
End: 2020-01-21 | Stop reason: HOSPADM

## 2019-12-13 RX ORDER — SODIUM CHLORIDE 9 MG/ML
INJECTION, SOLUTION INTRAVENOUS CONTINUOUS
Status: DISCONTINUED | OUTPATIENT
Start: 2019-12-13 | End: 2019-12-14

## 2019-12-13 RX ORDER — ALBUMIN (HUMAN) 12.5 G/50ML
50 SOLUTION INTRAVENOUS ONCE
Status: COMPLETED | OUTPATIENT
Start: 2019-12-13 | End: 2019-12-13

## 2019-12-13 RX ORDER — LACTULOSE 10 G/15ML
20 SOLUTION ORAL 3 TIMES DAILY
Status: ON HOLD | COMMUNITY
End: 2019-12-14 | Stop reason: SDUPTHER

## 2019-12-13 RX ADMIN — SODIUM CHLORIDE: 9 INJECTION, SOLUTION INTRAVENOUS at 08:52

## 2019-12-13 RX ADMIN — INSULIN GLARGINE 30 UNITS: 100 INJECTION, SOLUTION SUBCUTANEOUS at 08:53

## 2019-12-13 RX ADMIN — SODIUM CHLORIDE, PRESERVATIVE FREE 10 ML: 5 INJECTION INTRAVENOUS at 08:53

## 2019-12-13 RX ADMIN — MIDODRINE HYDROCHLORIDE 10 MG: 10 TABLET ORAL at 17:29

## 2019-12-13 RX ADMIN — MIDODRINE HYDROCHLORIDE 10 MG: 10 TABLET ORAL at 11:24

## 2019-12-13 RX ADMIN — INSULIN LISPRO 6 UNITS: 100 INJECTION, SOLUTION INTRAVENOUS; SUBCUTANEOUS at 12:11

## 2019-12-13 RX ADMIN — LACTULOSE 20 G: 20 SOLUTION ORAL at 08:52

## 2019-12-13 RX ADMIN — SODIUM CHLORIDE, PRESERVATIVE FREE 10 ML: 5 INJECTION INTRAVENOUS at 19:42

## 2019-12-13 RX ADMIN — ONDANSETRON 4 MG: 2 INJECTION INTRAMUSCULAR; INTRAVENOUS at 15:30

## 2019-12-13 RX ADMIN — INSULIN LISPRO 6 UNITS: 100 INJECTION, SOLUTION INTRAVENOUS; SUBCUTANEOUS at 08:54

## 2019-12-13 RX ADMIN — INSULIN LISPRO 10 UNITS: 100 INJECTION, SOLUTION INTRAVENOUS; SUBCUTANEOUS at 12:12

## 2019-12-13 RX ADMIN — ALBUMIN (HUMAN) 50 G: 0.25 INJECTION, SOLUTION INTRAVENOUS at 15:20

## 2019-12-13 RX ADMIN — INSULIN LISPRO 10 UNITS: 100 INJECTION, SOLUTION INTRAVENOUS; SUBCUTANEOUS at 08:54

## 2019-12-13 RX ADMIN — LEVOTHYROXINE SODIUM 300 MCG: 150 TABLET ORAL at 05:44

## 2019-12-13 RX ADMIN — MIDODRINE HYDROCHLORIDE 10 MG: 10 TABLET ORAL at 08:52

## 2019-12-13 RX ADMIN — ONDANSETRON 4 MG: 2 INJECTION INTRAMUSCULAR; INTRAVENOUS at 21:52

## 2019-12-13 RX ADMIN — SERTRALINE 50 MG: 50 TABLET, FILM COATED ORAL at 08:52

## 2019-12-13 RX ADMIN — SODIUM BICARBONATE: 84 INJECTION, SOLUTION INTRAVENOUS at 05:44

## 2019-12-13 ASSESSMENT — PAIN SCALES - GENERAL
PAINLEVEL_OUTOF10: 6
PAINLEVEL_OUTOF10: 0
PAINLEVEL_OUTOF10: 2

## 2019-12-13 ASSESSMENT — PAIN DESCRIPTION - ORIENTATION: ORIENTATION: UPPER

## 2019-12-13 ASSESSMENT — PAIN SCALES - WONG BAKER: WONGBAKER_NUMERICALRESPONSE: 4

## 2019-12-13 ASSESSMENT — PAIN DESCRIPTION - PAIN TYPE: TYPE: ACUTE PAIN

## 2019-12-13 ASSESSMENT — PAIN DESCRIPTION - LOCATION: LOCATION: ABDOMEN

## 2019-12-14 VITALS
SYSTOLIC BLOOD PRESSURE: 94 MMHG | TEMPERATURE: 98.7 F | HEART RATE: 75 BPM | WEIGHT: 221.1 LBS | DIASTOLIC BLOOD PRESSURE: 51 MMHG | BODY MASS INDEX: 37.75 KG/M2 | OXYGEN SATURATION: 93 % | HEIGHT: 64 IN | RESPIRATION RATE: 18 BRPM

## 2019-12-14 LAB
ANION GAP SERPL CALCULATED.3IONS-SCNC: 13 MEQ/L (ref 8–16)
BUN BLDV-MCNC: 23 MG/DL (ref 7–22)
CALCIUM SERPL-MCNC: 8.2 MG/DL (ref 8.5–10.5)
CHLORIDE BLD-SCNC: 100 MEQ/L (ref 98–111)
CO2: 22 MEQ/L (ref 23–33)
CREAT SERPL-MCNC: 2.1 MG/DL (ref 0.4–1.2)
GFR SERPL CREATININE-BSD FRML MDRD: 24 ML/MIN/1.73M2
GLUCOSE BLD-MCNC: 111 MG/DL (ref 70–108)
GLUCOSE BLD-MCNC: 128 MG/DL (ref 70–108)
GLUCOSE BLD-MCNC: 182 MG/DL (ref 70–108)
GLUCOSE BLD-MCNC: 73 MG/DL (ref 70–108)
GLUCOSE BLD-MCNC: 90 MG/DL (ref 70–108)
POTASSIUM SERPL-SCNC: 4 MEQ/L (ref 3.5–5.2)
SODIUM BLD-SCNC: 135 MEQ/L (ref 135–145)

## 2019-12-14 PROCEDURE — 36415 COLL VENOUS BLD VENIPUNCTURE: CPT

## 2019-12-14 PROCEDURE — 82948 REAGENT STRIP/BLOOD GLUCOSE: CPT

## 2019-12-14 PROCEDURE — 2580000003 HC RX 258: Performed by: FAMILY MEDICINE

## 2019-12-14 PROCEDURE — 2580000003 HC RX 258: Performed by: INTERNAL MEDICINE

## 2019-12-14 PROCEDURE — 99239 HOSP IP/OBS DSCHRG MGMT >30: CPT | Performed by: INTERNAL MEDICINE

## 2019-12-14 PROCEDURE — 94760 N-INVAS EAR/PLS OXIMETRY 1: CPT

## 2019-12-14 PROCEDURE — 6370000000 HC RX 637 (ALT 250 FOR IP): Performed by: INTERNAL MEDICINE

## 2019-12-14 PROCEDURE — 80048 BASIC METABOLIC PNL TOTAL CA: CPT

## 2019-12-14 PROCEDURE — 6370000000 HC RX 637 (ALT 250 FOR IP): Performed by: FAMILY MEDICINE

## 2019-12-14 RX ORDER — LACTULOSE 10 G/15ML
20 SOLUTION ORAL DAILY
Qty: 473 ML | Refills: 2 | Status: SHIPPED | OUTPATIENT
Start: 2019-12-14

## 2019-12-14 RX ORDER — BUMETANIDE 1 MG/1
1 TABLET ORAL DAILY
Qty: 30 TABLET | Refills: 3 | Status: ON HOLD | OUTPATIENT
Start: 2019-12-15 | End: 2020-01-21 | Stop reason: HOSPADM

## 2019-12-14 RX ORDER — MIDODRINE HYDROCHLORIDE 5 MG/1
15 TABLET ORAL
Qty: 90 TABLET | Refills: 3 | Status: SHIPPED | OUTPATIENT
Start: 2019-12-14

## 2019-12-14 RX ORDER — BUMETANIDE 1 MG/1
1 TABLET ORAL DAILY
Status: DISCONTINUED | OUTPATIENT
Start: 2019-12-14 | End: 2019-12-15 | Stop reason: HOSPADM

## 2019-12-14 RX ADMIN — INSULIN GLARGINE 30 UNITS: 100 INJECTION, SOLUTION SUBCUTANEOUS at 09:13

## 2019-12-14 RX ADMIN — INSULIN LISPRO 10 UNITS: 100 INJECTION, SOLUTION INTRAVENOUS; SUBCUTANEOUS at 11:45

## 2019-12-14 RX ADMIN — MIDODRINE HYDROCHLORIDE 10 MG: 10 TABLET ORAL at 11:44

## 2019-12-14 RX ADMIN — BUMETANIDE 1 MG: 1 TABLET ORAL at 10:26

## 2019-12-14 RX ADMIN — INSULIN LISPRO 10 UNITS: 100 INJECTION, SOLUTION INTRAVENOUS; SUBCUTANEOUS at 09:14

## 2019-12-14 RX ADMIN — LACTULOSE 20 G: 20 SOLUTION ORAL at 09:10

## 2019-12-14 RX ADMIN — MIDODRINE HYDROCHLORIDE 10 MG: 10 TABLET ORAL at 18:07

## 2019-12-14 RX ADMIN — MIDODRINE HYDROCHLORIDE 10 MG: 10 TABLET ORAL at 09:09

## 2019-12-14 RX ADMIN — INSULIN LISPRO 2 UNITS: 100 INJECTION, SOLUTION INTRAVENOUS; SUBCUTANEOUS at 11:44

## 2019-12-14 RX ADMIN — LEVOTHYROXINE SODIUM 300 MCG: 150 TABLET ORAL at 09:09

## 2019-12-14 RX ADMIN — SODIUM CHLORIDE, PRESERVATIVE FREE 10 ML: 5 INJECTION INTRAVENOUS at 09:13

## 2019-12-14 RX ADMIN — SODIUM CHLORIDE: 9 INJECTION, SOLUTION INTRAVENOUS at 03:35

## 2019-12-14 RX ADMIN — SERTRALINE 50 MG: 50 TABLET, FILM COATED ORAL at 09:09

## 2019-12-14 ASSESSMENT — PAIN SCALES - GENERAL
PAINLEVEL_OUTOF10: 0

## 2019-12-16 LAB
ANAEROBIC CULTURE: NORMAL
BODY FLUID CULTURE, STERILE: NORMAL
GRAM STAIN RESULT: NORMAL

## 2019-12-17 ENCOUNTER — TELEPHONE (OUTPATIENT)
Dept: UROLOGY | Age: 59
End: 2019-12-17

## 2019-12-17 NOTE — TELEPHONE ENCOUNTER
----- Message from [facility], APRN - CNP sent at 12/14/2019  5:58 PM EST -----  Pt being discharged from the hospital with mederos today. Was not seen by us during hospital stay. Needs follow up this week with one of the providers for new patient visit, voiding trial, and possible self cath teaching.   Visit can be scheduled with an NP.

## 2019-12-17 NOTE — TELEPHONE ENCOUNTER
Called pt and she was shopping and said that she will call when she gets home to schedule the appointment.      Thanks

## 2019-12-20 DIAGNOSIS — N18.30 CKD (CHRONIC KIDNEY DISEASE) STAGE 3, GFR 30-59 ML/MIN (HCC): Primary | ICD-10-CM

## 2019-12-27 ENCOUNTER — HOSPITAL ENCOUNTER (OUTPATIENT)
Dept: ULTRASOUND IMAGING | Age: 59
Discharge: HOME | End: 2019-12-27
Payer: MEDICAID

## 2019-12-27 ENCOUNTER — HOSPITAL ENCOUNTER (OUTPATIENT)
Dept: NURSING | Age: 59
Discharge: HOME | End: 2019-12-27
Payer: MEDICAID

## 2019-12-27 VITALS
OXYGEN SATURATION: 98 % | DIASTOLIC BLOOD PRESSURE: 52 MMHG | RESPIRATION RATE: 18 BRPM | TEMPERATURE: 96.7 F | SYSTOLIC BLOOD PRESSURE: 102 MMHG | HEART RATE: 71 BPM

## 2019-12-27 DIAGNOSIS — K70.31 ALCOHOLIC CIRRHOSIS OF LIVER WITH ASCITES (HCC): ICD-10-CM

## 2019-12-27 LAB
ALBUMIN FLUID: 0.9 GM/DL
BODY FLUID RBC: < 2000 /CUMM
CHARACTER, BODY FLUID: NORMAL
COLOR: NORMAL
MONONUCLEAR CELLS BODY FLUID: 89.8 %
PATHOLOGIST REVIEW: NORMAL
POLYMORPHONUCLEAR CELLS BODY FLUID: 10.2 %
PROTEIN FLUID: 1.5 GM/DL
SPECIMEN: NORMAL
TOTAL NUCLEATED CELLS BODY FLUID: 71 /CUMM (ref 0–500)
TOTAL VOLUME RECEIVED BODY FLUID: 70 ML

## 2019-12-27 PROCEDURE — 96365 THER/PROPH/DIAG IV INF INIT: CPT

## 2019-12-27 PROCEDURE — 49083 ABD PARACENTESIS W/IMAGING: CPT

## 2019-12-27 PROCEDURE — 87205 SMEAR GRAM STAIN: CPT

## 2019-12-27 PROCEDURE — 84157 ASSAY OF PROTEIN OTHER: CPT

## 2019-12-27 PROCEDURE — 6360000002 HC RX W HCPCS: Performed by: NURSE PRACTITIONER

## 2019-12-27 PROCEDURE — 87075 CULTR BACTERIA EXCEPT BLOOD: CPT

## 2019-12-27 PROCEDURE — 82042 OTHER SOURCE ALBUMIN QUAN EA: CPT

## 2019-12-27 PROCEDURE — 2709999900 HC NON-CHARGEABLE SUPPLY

## 2019-12-27 PROCEDURE — 89050 BODY FLUID CELL COUNT: CPT

## 2019-12-27 PROCEDURE — 88112 CYTOPATH CELL ENHANCE TECH: CPT

## 2019-12-27 PROCEDURE — 88305 TISSUE EXAM BY PATHOLOGIST: CPT

## 2019-12-27 PROCEDURE — 87070 CULTURE OTHR SPECIMN AEROBIC: CPT

## 2019-12-27 PROCEDURE — P9047 ALBUMIN (HUMAN), 25%, 50ML: HCPCS | Performed by: NURSE PRACTITIONER

## 2019-12-27 RX ORDER — ALBUMIN (HUMAN) 12.5 G/50ML
25 SOLUTION INTRAVENOUS ONCE
Status: COMPLETED | OUTPATIENT
Start: 2019-12-27 | End: 2019-12-27

## 2019-12-27 RX ADMIN — ALBUMIN (HUMAN) 25 G: 0.25 INJECTION, SOLUTION INTRAVENOUS at 11:51

## 2019-12-27 ASSESSMENT — PAIN - FUNCTIONAL ASSESSMENT: PAIN_FUNCTIONAL_ASSESSMENT: 0-10

## 2019-12-27 ASSESSMENT — PAIN DESCRIPTION - DESCRIPTORS: DESCRIPTORS: ACHING

## 2019-12-27 NOTE — PROGRESS NOTES
7582 Patient arrived to [facility] ambulatory for albumin infusion  PT RIGHTS AND RESPONSIBILITIES OFFERED TO PT.  1305 Discharge instructions given to patient verbalized understanding.  Patient discharged to home in stable condition transport company to pick patient up    m____ Safety:       (Environmental)   [place] to environment  [facility] ID band is correct and in place/ allergy band as needed   Assess for fall risk   Initiate fall precautions as applicable (fall band, side rails, etc.)   Call light within reach   Bed in low position/ wheels locked    m____ Pain:        Assess pain level and characteristics   Administer analgesics as ordered   Assess effectiveness of pain management and report to MD as needed    m____ Knowledge Deficit:   Assess baseline knowledge   Provide teaching at level of understanding   Provide teaching via preferred learning method   Evaluate teaching effectiveness    m____ Hemodynamic/Respiratory Status:       (Pre and Post Procedure Monitoring)   Assess/Monitor vital signs and LOC   Assess Baseline SpO2 prior to any sedation   Obtain weight/height   Assess vital signs/ LOC until patient meets discharge criteria   Monitor procedure site and notify MD of any issues

## 2020-01-01 LAB
ANAEROBIC CULTURE: NORMAL
BODY FLUID CULTURE, STERILE: NORMAL
GRAM STAIN RESULT: NORMAL

## 2020-01-02 PROBLEM — N32.81 OVERACTIVE BLADDER: Status: ACTIVE | Noted: 2019-06-18

## 2020-01-10 ENCOUNTER — HOSPITAL ENCOUNTER (OUTPATIENT)
Dept: ULTRASOUND IMAGING | Age: 60
Discharge: HOME | End: 2020-01-10
Payer: MEDICAID

## 2020-01-10 ENCOUNTER — HOSPITAL ENCOUNTER (OUTPATIENT)
Dept: NURSING | Age: 60
Discharge: HOME | End: 2020-01-10
Payer: MEDICAID

## 2020-01-10 VITALS — SYSTOLIC BLOOD PRESSURE: 112 MMHG | RESPIRATION RATE: 16 BRPM | DIASTOLIC BLOOD PRESSURE: 44 MMHG | HEART RATE: 78 BPM

## 2020-01-10 LAB
ALBUMIN FLUID: 0.7 GM/DL
BODY FLUID RBC: < 2000 /CUMM
CHARACTER, BODY FLUID: CLEAR
COLOR: YELLOW
MESOTHELIAL CELLS BODY FLUID: NORMAL
MONONUCLEAR CELLS BODY FLUID: 84.1 %
PATHOLOGIST REVIEW: NORMAL
POLYMORPHONUCLEAR CELLS BODY FLUID: 15.9 %
PROTEIN FLUID: 1.6 GM/DL
SPECIMEN: NORMAL
TOTAL NUCLEATED CELLS BODY FLUID: 60 /CUMM (ref 0–500)
TOTAL VOLUME RECEIVED BODY FLUID: 80 ML

## 2020-01-10 PROCEDURE — 87205 SMEAR GRAM STAIN: CPT

## 2020-01-10 PROCEDURE — 2709999900 HC NON-CHARGEABLE SUPPLY

## 2020-01-10 PROCEDURE — 6360000002 HC RX W HCPCS: Performed by: NURSE PRACTITIONER

## 2020-01-10 PROCEDURE — 87070 CULTURE OTHR SPECIMN AEROBIC: CPT

## 2020-01-10 PROCEDURE — 82042 OTHER SOURCE ALBUMIN QUAN EA: CPT

## 2020-01-10 PROCEDURE — 88112 CYTOPATH CELL ENHANCE TECH: CPT

## 2020-01-10 PROCEDURE — P9047 ALBUMIN (HUMAN), 25%, 50ML: HCPCS | Performed by: NURSE PRACTITIONER

## 2020-01-10 PROCEDURE — 49083 ABD PARACENTESIS W/IMAGING: CPT

## 2020-01-10 PROCEDURE — 87075 CULTR BACTERIA EXCEPT BLOOD: CPT

## 2020-01-10 PROCEDURE — 96365 THER/PROPH/DIAG IV INF INIT: CPT

## 2020-01-10 PROCEDURE — 88305 TISSUE EXAM BY PATHOLOGIST: CPT

## 2020-01-10 PROCEDURE — 84157 ASSAY OF PROTEIN OTHER: CPT

## 2020-01-10 PROCEDURE — 89050 BODY FLUID CELL COUNT: CPT

## 2020-01-10 RX ORDER — ALBUMIN (HUMAN) 12.5 G/50ML
25 SOLUTION INTRAVENOUS ONCE
Status: COMPLETED | OUTPATIENT
Start: 2020-01-10 | End: 2020-01-10

## 2020-01-10 RX ADMIN — ALBUMIN (HUMAN) 25 G: 0.25 INJECTION, SOLUTION INTRAVENOUS at 12:16

## 2020-01-10 NOTE — PROGRESS NOTES
1320 infusion completed. Pt marry well. Stable. Discharge instructions reviewed with patient. 1330 pt discharged per wheelchair to [facility]. Ride called.

## 2020-01-10 NOTE — PROGRESS NOTES
1200 Admitted per wheelchair for albumin procedure reviewed with pt verbalized understanding. Pt rights and responsibilities offered to pt to read.    __met__ Safety:       (Environmental)   [place] to environment   Ensure ID band is correct and in place/ allergy band as needed   Assess for fall risk   Initiate fall precautions as applicable (fall band, side rails, etc.)   Call light within reach   Bed in low position/ wheels locked    __met__ Pain:        Assess pain level and characteristics   Administer analgesics as ordered   Assess effectiveness of pain management and report to MD as needed    __met__ Knowledge Deficit:   Assess baseline knowledge   Provide teaching at level of understanding   Provide teaching via preferred learning method   Evaluate teaching effectiveness    __met__ Hemodynamic/Respiratory Status:       (Pre and Post Procedure Monitoring)   Assess/Monitor vital signs and LOC      Assess vital signs/ LOC until patient meets discharge criteria   Monitor procedure site and notify MD of any issues    ____ Infection-Risk of Central Venous C

## 2020-01-14 ENCOUNTER — APPOINTMENT (OUTPATIENT)
Dept: ULTRASOUND IMAGING | Age: 60
DRG: 420 | End: 2020-01-14
Attending: INTERNAL MEDICINE
Payer: MEDICAID

## 2020-01-14 ENCOUNTER — HOSPITAL ENCOUNTER (INPATIENT)
Age: 60
LOS: 8 days | Discharge: HOSPICE | DRG: 420 | End: 2020-01-22
Attending: INTERNAL MEDICINE | Admitting: INTERNAL MEDICINE
Payer: MEDICAID

## 2020-01-14 PROBLEM — R73.9 HYPERGLYCEMIA: Status: ACTIVE | Noted: 2020-01-14

## 2020-01-14 LAB
ANION GAP SERPL CALCULATED.3IONS-SCNC: 16 MEQ/L (ref 8–16)
AVERAGE GLUCOSE: 420 MG/DL (ref 70–126)
BASOPHILS # BLD: 0.5 %
BASOPHILS ABSOLUTE: 0 THOU/MM3 (ref 0–0.1)
BUN BLDV-MCNC: 29 MG/DL (ref 7–22)
CALCIUM SERPL-MCNC: 9.3 MG/DL (ref 8.5–10.5)
CHLORIDE BLD-SCNC: 100 MEQ/L (ref 98–111)
CO2: 17 MEQ/L (ref 23–33)
CREAT SERPL-MCNC: 3 MG/DL (ref 0.4–1.2)
EOSINOPHIL # BLD: 1.2 %
EOSINOPHILS ABSOLUTE: 0.1 THOU/MM3 (ref 0–0.4)
ERYTHROCYTE [DISTWIDTH] IN BLOOD BY AUTOMATED COUNT: 13.6 % (ref 11.5–14.5)
ERYTHROCYTE [DISTWIDTH] IN BLOOD BY AUTOMATED COUNT: 46.3 FL (ref 35–45)
GFR SERPL CREATININE-BSD FRML MDRD: 16 ML/MIN/1.73M2
GLUCOSE BLD-MCNC: 128 MG/DL (ref 70–108)
GLUCOSE BLD-MCNC: 128 MG/DL (ref 70–108)
GLUCOSE BLD-MCNC: 132 MG/DL (ref 70–108)
GLUCOSE BLD-MCNC: 149 MG/DL (ref 70–108)
GLUCOSE BLD-MCNC: 191 MG/DL (ref 70–108)
GLUCOSE BLD-MCNC: 210 MG/DL (ref 70–108)
GLUCOSE BLD-MCNC: 237 MG/DL (ref 70–108)
GLUCOSE BLD-MCNC: 261 MG/DL (ref 70–108)
GLUCOSE BLD-MCNC: 263 MG/DL (ref 70–108)
GLUCOSE BLD-MCNC: 272 MG/DL (ref 70–108)
GLUCOSE BLD-MCNC: 302 MG/DL (ref 70–108)
GLUCOSE BLD-MCNC: 338 MG/DL (ref 70–108)
GLUCOSE BLD-MCNC: 364 MG/DL (ref 70–108)
HBA1C MFR BLD: 16 % (ref 4.4–6.4)
HCT VFR BLD CALC: 35.2 % (ref 37–47)
HEMOGLOBIN: 11.7 GM/DL (ref 12–16)
IMMATURE GRANS (ABS): 0.16 THOU/MM3 (ref 0–0.07)
IMMATURE GRANULOCYTES: 1.7 %
INR BLD: 1.18 (ref 0.85–1.13)
LYMPHOCYTES # BLD: 7.4 %
LYMPHOCYTES ABSOLUTE: 0.7 THOU/MM3 (ref 1–4.8)
MCH RBC QN AUTO: 31.2 PG (ref 26–33)
MCHC RBC AUTO-ENTMCNC: 33.2 GM/DL (ref 32.2–35.5)
MCV RBC AUTO: 93.9 FL (ref 81–99)
MONOCYTES # BLD: 10.3 %
MONOCYTES ABSOLUTE: 1 THOU/MM3 (ref 0.4–1.3)
MRSA SCREEN RT-PCR: NEGATIVE
NUCLEATED RED BLOOD CELLS: 0 /100 WBC
PHOSPHORUS: 2.1 MG/DL (ref 2.4–4.7)
PHOSPHORUS: 2.5 MG/DL (ref 2.4–4.7)
PHOSPHORUS: 2.6 MG/DL (ref 2.4–4.7)
PHOSPHORUS: 2.7 MG/DL (ref 2.4–4.7)
PLATELET # BLD: 100 THOU/MM3 (ref 130–400)
PMV BLD AUTO: 10.4 FL (ref 9.4–12.4)
POTASSIUM REFLEX MAGNESIUM: 5.2 MEQ/L (ref 3.5–5.2)
RBC # BLD: 3.75 MILL/MM3 (ref 4.2–5.4)
SEG NEUTROPHILS: 78.9 %
SEGMENTED NEUTROPHILS ABSOLUTE COUNT: 7.5 THOU/MM3 (ref 1.8–7.7)
SODIUM BLD-SCNC: 133 MEQ/L (ref 135–145)
VANCOMYCIN RESISTANT ENTEROCOCCUS: NEGATIVE
WBC # BLD: 9.5 THOU/MM3 (ref 4.8–10.8)

## 2020-01-14 PROCEDURE — 2580000003 HC RX 258: Performed by: INTERNAL MEDICINE

## 2020-01-14 PROCEDURE — 85610 PROTHROMBIN TIME: CPT

## 2020-01-14 PROCEDURE — 6370000000 HC RX 637 (ALT 250 FOR IP): Performed by: PHYSICIAN ASSISTANT

## 2020-01-14 PROCEDURE — 83036 HEMOGLOBIN GLYCOSYLATED A1C: CPT

## 2020-01-14 PROCEDURE — 87500 VANOMYCIN DNA AMP PROBE: CPT

## 2020-01-14 PROCEDURE — 51798 US URINE CAPACITY MEASURE: CPT

## 2020-01-14 PROCEDURE — 49083 ABD PARACENTESIS W/IMAGING: CPT

## 2020-01-14 PROCEDURE — 94760 N-INVAS EAR/PLS OXIMETRY 1: CPT

## 2020-01-14 PROCEDURE — 82948 REAGENT STRIP/BLOOD GLUCOSE: CPT

## 2020-01-14 PROCEDURE — 6370000000 HC RX 637 (ALT 250 FOR IP): Performed by: INTERNAL MEDICINE

## 2020-01-14 PROCEDURE — 2060000000 HC ICU INTERMEDIATE R&B

## 2020-01-14 PROCEDURE — 36415 COLL VENOUS BLD VENIPUNCTURE: CPT

## 2020-01-14 PROCEDURE — 87641 MR-STAPH DNA AMP PROBE: CPT

## 2020-01-14 PROCEDURE — 84100 ASSAY OF PHOSPHORUS: CPT

## 2020-01-14 PROCEDURE — 99223 1ST HOSP IP/OBS HIGH 75: CPT | Performed by: PHYSICIAN ASSISTANT

## 2020-01-14 PROCEDURE — 85025 COMPLETE CBC W/AUTO DIFF WBC: CPT

## 2020-01-14 PROCEDURE — 80048 BASIC METABOLIC PNL TOTAL CA: CPT

## 2020-01-14 PROCEDURE — 2580000003 HC RX 258: Performed by: PHYSICIAN ASSISTANT

## 2020-01-14 PROCEDURE — 87081 CULTURE SCREEN ONLY: CPT

## 2020-01-14 PROCEDURE — 0W9G3ZZ DRAINAGE OF PERITONEAL CAVITY, PERCUTANEOUS APPROACH: ICD-10-PCS | Performed by: RADIOLOGY

## 2020-01-14 PROCEDURE — 6360000002 HC RX W HCPCS: Performed by: PHYSICIAN ASSISTANT

## 2020-01-14 PROCEDURE — 2709999900 HC NON-CHARGEABLE SUPPLY

## 2020-01-14 RX ORDER — DEXTROSE MONOHYDRATE 25 G/50ML
12.5 INJECTION, SOLUTION INTRAVENOUS PRN
Status: DISCONTINUED | OUTPATIENT
Start: 2020-01-14 | End: 2020-01-22 | Stop reason: HOSPADM

## 2020-01-14 RX ORDER — SPIRONOLACTONE 25 MG/1
50 TABLET ORAL DAILY
Status: DISCONTINUED | OUTPATIENT
Start: 2020-01-14 | End: 2020-01-22 | Stop reason: HOSPADM

## 2020-01-14 RX ORDER — SODIUM CHLORIDE 9 MG/ML
INJECTION, SOLUTION INTRAVENOUS CONTINUOUS
Status: DISCONTINUED | OUTPATIENT
Start: 2020-01-14 | End: 2020-01-19

## 2020-01-14 RX ORDER — DEXTROSE MONOHYDRATE 50 MG/ML
100 INJECTION, SOLUTION INTRAVENOUS PRN
Status: DISCONTINUED | OUTPATIENT
Start: 2020-01-14 | End: 2020-01-22 | Stop reason: HOSPADM

## 2020-01-14 RX ORDER — SODIUM CHLORIDE 0.9 % (FLUSH) 0.9 %
10 SYRINGE (ML) INJECTION EVERY 12 HOURS SCHEDULED
Status: DISCONTINUED | OUTPATIENT
Start: 2020-01-14 | End: 2020-01-22 | Stop reason: HOSPADM

## 2020-01-14 RX ORDER — NICOTINE POLACRILEX 4 MG
15 LOZENGE BUCCAL PRN
Status: DISCONTINUED | OUTPATIENT
Start: 2020-01-14 | End: 2020-01-22 | Stop reason: HOSPADM

## 2020-01-14 RX ORDER — ONDANSETRON 2 MG/ML
4 INJECTION INTRAMUSCULAR; INTRAVENOUS EVERY 6 HOURS PRN
Status: DISCONTINUED | OUTPATIENT
Start: 2020-01-14 | End: 2020-01-22 | Stop reason: HOSPADM

## 2020-01-14 RX ORDER — POTASSIUM CHLORIDE 20 MEQ/1
40 TABLET, EXTENDED RELEASE ORAL PRN
Status: DISCONTINUED | OUTPATIENT
Start: 2020-01-14 | End: 2020-01-22 | Stop reason: HOSPADM

## 2020-01-14 RX ORDER — BUMETANIDE 1 MG/1
1 TABLET ORAL DAILY
Status: DISCONTINUED | OUTPATIENT
Start: 2020-01-14 | End: 2020-01-22 | Stop reason: HOSPADM

## 2020-01-14 RX ORDER — INSULIN GLARGINE 100 [IU]/ML
10 INJECTION, SOLUTION SUBCUTANEOUS ONCE
Status: COMPLETED | OUTPATIENT
Start: 2020-01-14 | End: 2020-01-14

## 2020-01-14 RX ORDER — POLYETHYLENE GLYCOL 3350 17 G/17G
17 POWDER, FOR SOLUTION ORAL DAILY PRN
Status: DISCONTINUED | OUTPATIENT
Start: 2020-01-14 | End: 2020-01-22 | Stop reason: HOSPADM

## 2020-01-14 RX ORDER — SODIUM CHLORIDE 0.9 % (FLUSH) 0.9 %
10 SYRINGE (ML) INJECTION PRN
Status: DISCONTINUED | OUTPATIENT
Start: 2020-01-14 | End: 2020-01-22 | Stop reason: HOSPADM

## 2020-01-14 RX ORDER — LACTULOSE 10 G/15ML
20 SOLUTION ORAL DAILY
Status: DISCONTINUED | OUTPATIENT
Start: 2020-01-14 | End: 2020-01-17

## 2020-01-14 RX ORDER — POTASSIUM CHLORIDE 7.45 MG/ML
10 INJECTION INTRAVENOUS PRN
Status: DISCONTINUED | OUTPATIENT
Start: 2020-01-14 | End: 2020-01-22 | Stop reason: HOSPADM

## 2020-01-14 RX ORDER — DEXTROSE MONOHYDRATE 25 G/50ML
12.5 INJECTION, SOLUTION INTRAVENOUS PRN
Status: DISCONTINUED | OUTPATIENT
Start: 2020-01-14 | End: 2020-01-14 | Stop reason: SDUPTHER

## 2020-01-14 RX ORDER — INSULIN GLARGINE 100 [IU]/ML
20 INJECTION, SOLUTION SUBCUTANEOUS 2 TIMES DAILY
Status: DISCONTINUED | OUTPATIENT
Start: 2020-01-15 | End: 2020-01-15

## 2020-01-14 RX ORDER — LEVOTHYROXINE SODIUM 0.15 MG/1
300 TABLET ORAL DAILY
Status: DISCONTINUED | OUTPATIENT
Start: 2020-01-14 | End: 2020-01-22 | Stop reason: HOSPADM

## 2020-01-14 RX ORDER — 0.9 % SODIUM CHLORIDE 0.9 %
250 INTRAVENOUS SOLUTION INTRAVENOUS ONCE
Status: COMPLETED | OUTPATIENT
Start: 2020-01-14 | End: 2020-01-14

## 2020-01-14 RX ADMIN — RIFAXIMIN 550 MG: 550 TABLET ORAL at 20:40

## 2020-01-14 RX ADMIN — MIDODRINE HYDROCHLORIDE 15 MG: 10 TABLET ORAL at 08:55

## 2020-01-14 RX ADMIN — SODIUM CHLORIDE: 9 INJECTION, SOLUTION INTRAVENOUS at 05:37

## 2020-01-14 RX ADMIN — SODIUM CHLORIDE 250 ML: 9 INJECTION, SOLUTION INTRAVENOUS at 16:00

## 2020-01-14 RX ADMIN — BUMETANIDE 1 MG: 1 TABLET ORAL at 08:55

## 2020-01-14 RX ADMIN — INSULIN LISPRO 3 UNITS: 100 INJECTION, SOLUTION INTRAVENOUS; SUBCUTANEOUS at 20:41

## 2020-01-14 RX ADMIN — ONDANSETRON 4 MG: 2 INJECTION INTRAMUSCULAR; INTRAVENOUS at 15:40

## 2020-01-14 RX ADMIN — SODIUM CHLORIDE: 9 INJECTION, SOLUTION INTRAVENOUS at 20:40

## 2020-01-14 RX ADMIN — ONDANSETRON 4 MG: 2 INJECTION INTRAMUSCULAR; INTRAVENOUS at 10:02

## 2020-01-14 RX ADMIN — MIDODRINE HYDROCHLORIDE 15 MG: 10 TABLET ORAL at 18:00

## 2020-01-14 RX ADMIN — INSULIN LISPRO 2 UNITS: 100 INJECTION, SOLUTION INTRAVENOUS; SUBCUTANEOUS at 18:00

## 2020-01-14 RX ADMIN — SODIUM CHLORIDE 6.1 UNITS/HR: 9 INJECTION, SOLUTION INTRAVENOUS at 03:58

## 2020-01-14 RX ADMIN — Medication 10 ML: at 20:41

## 2020-01-14 RX ADMIN — RIFAXIMIN 550 MG: 550 TABLET ORAL at 08:55

## 2020-01-14 RX ADMIN — INSULIN GLARGINE 10 UNITS: 100 INJECTION, SOLUTION SUBCUTANEOUS at 15:58

## 2020-01-14 RX ADMIN — LEVOTHYROXINE SODIUM 300 MCG: 150 TABLET ORAL at 06:53

## 2020-01-14 RX ADMIN — LACTULOSE 20 G: 20 SOLUTION ORAL at 08:55

## 2020-01-14 RX ADMIN — SODIUM CHLORIDE: 9 INJECTION, SOLUTION INTRAVENOUS at 20:41

## 2020-01-14 RX ADMIN — SERTRALINE 50 MG: 50 TABLET, FILM COATED ORAL at 08:55

## 2020-01-14 RX ADMIN — MIDODRINE HYDROCHLORIDE 15 MG: 10 TABLET ORAL at 13:32

## 2020-01-14 ASSESSMENT — ENCOUNTER SYMPTOMS
ALLERGIC/IMMUNOLOGIC NEGATIVE: 1
RESPIRATORY NEGATIVE: 1
EYES NEGATIVE: 1
ABDOMINAL DISTENTION: 1

## 2020-01-14 ASSESSMENT — PAIN SCALES - GENERAL
PAINLEVEL_OUTOF10: 0
PAINLEVEL_OUTOF10: 10
PAINLEVEL_OUTOF10: 10
PAINLEVEL_OUTOF10: 8

## 2020-01-14 ASSESSMENT — PAIN DESCRIPTION - ONSET
ONSET: GRADUAL
ONSET: AWAKENED FROM SLEEP

## 2020-01-14 ASSESSMENT — PAIN DESCRIPTION - PAIN TYPE
TYPE: CHRONIC PAIN
TYPE: CHRONIC PAIN

## 2020-01-14 ASSESSMENT — PAIN DESCRIPTION - DESCRIPTORS
DESCRIPTORS: CRAMPING;ACHING
DESCRIPTORS: CRAMPING;ACHING

## 2020-01-14 ASSESSMENT — PAIN DESCRIPTION - ORIENTATION
ORIENTATION: RIGHT;LEFT
ORIENTATION: RIGHT;LEFT

## 2020-01-14 ASSESSMENT — PAIN DESCRIPTION - PROGRESSION
CLINICAL_PROGRESSION: GRADUALLY WORSENING

## 2020-01-14 ASSESSMENT — PAIN DESCRIPTION - LOCATION
LOCATION: LEG
LOCATION: LEG

## 2020-01-14 ASSESSMENT — PAIN DESCRIPTION - FREQUENCY
FREQUENCY: CONTINUOUS
FREQUENCY: CONTINUOUS

## 2020-01-14 NOTE — FLOWSHEET NOTE
01/14/20 0610   Provider Notification   Reason for Communication Review case   Provider Name [facility]   Provider Notification Physician Assistant   Method of Communication Secure Message   Response See orders   Notification Time 4549   Patient leg pain/cramps back 10/10. Orders received.

## 2020-01-14 NOTE — H&P
Assessment and Plan:        1. Hyperglycemia: insulin drip, q 4 hour labs with replacement protocols  2. Alcoholic cirrhosis with ascites: currently well compensated, continue home medications, plan for inpatient paracentesis is patient is still here at the end of the week. 3. Chronic kidney disease: watch fluid balance closely  4. Type 2 DM: insulin drip until better control then transition back to home medications    CC:  hyperglycemia  HPI: Patient transferred from [facility].  Patient was being evaluated for chronic leg pain and found to have high blood sugars. Patient was started on an insulin drip and transferred for management in the setting of chronic cirrhosis and ascites. ROS: Review of Systems   Constitutional: Positive for fatigue. HENT: Negative. Eyes: Negative. Respiratory: Negative. Cardiovascular: Negative. Gastrointestinal: Positive for abdominal distention. Endocrine: Negative. Genitourinary: Negative. Musculoskeletal: Negative. Skin: Negative. Allergic/Immunologic: Negative. Neurological: Negative. Hematological: Negative. Psychiatric/Behavioral: Negative.           PMH:    Past Medical History:   Diagnosis Date    Anxiety disorder     Arthritis     Chronic kidney disease     COPD [facility] obstructive [facility] disease) [facility]     Diabetes mellitus ([facility]     GERD (gastroesophageal reflux disease)     H/O ETOH abuse     Heart abnormality     Hyperlipidemia     Liver disease     alcoholic liver disease, cirrhosis    Macrocytic anemia 10/4/2019    Thyroid disease        SHX:    Social History     Socioeconomic History    Marital status: Legally      Spouse name: Not on file    Number of children: Not on file    Years of education: Not on file    Highest education level: Not on file   Occupational History    Not on file   Social Needs    Financial resource strain: Not on file    Food insecurity:     Worry: Not on file Collection Time: 01/14/20  3:52 AM   Result Value Ref Range    POC Glucose 364 (H) 70 - 108 mg/dl   POCT glucose    Collection Time: 01/14/20  4:57 AM   Result Value Ref Range    POC Glucose 338 (H) 70 - 108 mg/dl         Vital Signs: T: 97.5F P: 83 RR: 20 B/P: 100/56: FiO2: RA: O2 Sat:98%: I/O: No intake or output data in the 24 hours ending 01/14/20 0557      General:   Chronically ill appearing, no acute distress  HEENT:  normocephalic and atraumatic. No scleral icterus. PEARLA, mucous membranes dry  Neck: supple. Trachea midline. No JVD. Full ROM, no meningismus. Lungs: clear to auscultation. No retractions, no accessory muscle use. Cardiac: RRR, no murmur, 2+ pulses  Abdomen: soft. Nontender. Bowel sounds active, large ascites  Extremities:  No clubbing, cyanosis x 4, no edema    Vasculature: capillary refill < 3 seconds. Skin:  warm and dry. no visible rashes  Psych:  Alert and oriented x3. Affect appropriate  Lymph:  No supraclavicular adenopathy. Neurologic:  CN II-XII grossly intact. No focal deficit. Data: (All radiographs, tracings, PFTs, and imaging are personally viewed and interpreted unless otherwise noted).     Outside data reviewed        Electronically signed by  Bruno Short PA-C

## 2020-01-14 NOTE — FLOWSHEET NOTE
01/14/20 0343   Provider Notification   Reason for Communication Review case   Provider Name [facility]   Provider Notification Physician Assistant   Method of Communication Secure Message   Response At bedside   Notification Time 0825   Patient having leg pain/cramps. [facility] at bedside, lab in to draw will reassess when labs back. Vitals stable.

## 2020-01-14 NOTE — PROGRESS NOTES
Pt arrived in 4K 15 via cart/stretcher and via ambulance. Complaints: DKA/Ascites. IV normal saline infusing into the forearm left, condition patent and no redness at a rate of 50 mls/ hour with about 900 mls remaining in the bag. The best day to schedule a follow up Dr appointment is:  Tuesday p.m.       The patient is interested in [facility]. [facility] to [facility] program?:  Yes

## 2020-01-15 ENCOUNTER — APPOINTMENT (OUTPATIENT)
Dept: GENERAL RADIOLOGY | Age: 60
DRG: 420 | End: 2020-01-15
Attending: INTERNAL MEDICINE
Payer: MEDICAID

## 2020-01-15 ENCOUNTER — APPOINTMENT (OUTPATIENT)
Dept: ULTRASOUND IMAGING | Age: 60
DRG: 420 | End: 2020-01-15
Attending: INTERNAL MEDICINE
Payer: MEDICAID

## 2020-01-15 LAB
AMORPHOUS: ABNORMAL
ANAEROBIC CULTURE: NORMAL
ANION GAP SERPL CALCULATED.3IONS-SCNC: 12 MEQ/L (ref 8–16)
BACTERIA: ABNORMAL
BASOPHILS # BLD: 0.5 %
BASOPHILS ABSOLUTE: 0.1 THOU/MM3 (ref 0–0.1)
BILIRUBIN URINE: NEGATIVE
BLOOD, URINE: ABNORMAL
BODY FLUID CULTURE, STERILE: NORMAL
BODY FLUID RBC: < 2000 /CUMM
BUN BLDV-MCNC: 30 MG/DL (ref 7–22)
CALCIUM SERPL-MCNC: 8.7 MG/DL (ref 8.5–10.5)
CASTS: ABNORMAL /LPF
CASTS: ABNORMAL /LPF
CHARACTER, BODY FLUID: NORMAL
CHARACTER, URINE: ABNORMAL
CHLORIDE BLD-SCNC: 100 MEQ/L (ref 98–111)
CO2: 17 MEQ/L (ref 23–33)
COLOR: NORMAL
COLOR: YELLOW
CREAT SERPL-MCNC: 3.3 MG/DL (ref 0.4–1.2)
CREATININE URINE: 186.7 MG/DL
CRYSTALS: ABNORMAL
EOSINOPHIL # BLD: 1 %
EOSINOPHILS ABSOLUTE: 0.1 THOU/MM3 (ref 0–0.4)
EPITHELIAL CELLS, UA: ABNORMAL /HPF
ERYTHROCYTE [DISTWIDTH] IN BLOOD BY AUTOMATED COUNT: 14.2 % (ref 11.5–14.5)
ERYTHROCYTE [DISTWIDTH] IN BLOOD BY AUTOMATED COUNT: 48.8 FL (ref 35–45)
GFR SERPL CREATININE-BSD FRML MDRD: 14 ML/MIN/1.73M2
GLUCOSE BLD-MCNC: 232 MG/DL (ref 70–108)
GLUCOSE BLD-MCNC: 249 MG/DL (ref 70–108)
GLUCOSE BLD-MCNC: 260 MG/DL (ref 70–108)
GLUCOSE BLD-MCNC: 273 MG/DL (ref 70–108)
GLUCOSE BLD-MCNC: 302 MG/DL (ref 70–108)
GLUCOSE BLD-MCNC: 303 MG/DL (ref 70–108)
GLUCOSE, URINE: NEGATIVE MG/DL
GRAM STAIN RESULT: NORMAL
HCT VFR BLD CALC: 35 % (ref 37–47)
HEMOGLOBIN: 11.6 GM/DL (ref 12–16)
IMMATURE GRANS (ABS): 0.24 THOU/MM3 (ref 0–0.07)
IMMATURE GRANULOCYTES: 2.2 %
KETONES, URINE: NEGATIVE
LEUKOCYTE EST, POC: ABNORMAL
LYMPHOCYTES # BLD: 7.2 %
LYMPHOCYTES ABSOLUTE: 0.8 THOU/MM3 (ref 1–4.8)
MCH RBC QN AUTO: 31.2 PG (ref 26–33)
MCHC RBC AUTO-ENTMCNC: 33.1 GM/DL (ref 32.2–35.5)
MCV RBC AUTO: 94.1 FL (ref 81–99)
MESOTHELIAL CELLS BODY FLUID: NORMAL
MISCELLANEOUS LAB TEST RESULT: ABNORMAL
MONOCYTES # BLD: 7.3 %
MONOCYTES ABSOLUTE: 0.8 THOU/MM3 (ref 0.4–1.3)
MONONUCLEAR CELLS BODY FLUID: 87.8 %
MUCUS: ABNORMAL
NITRITE, URINE: NEGATIVE
NUCLEATED RED BLOOD CELLS: 0 /100 WBC
OSMOLALITY URINE: 306 MOSMOL/KG (ref 250–750)
PATHOLOGIST REVIEW: NORMAL
PH UA: 5 (ref 5–9)
PLATELET # BLD: 111 THOU/MM3 (ref 130–400)
PMV BLD AUTO: 10 FL (ref 9.4–12.4)
POLYMORPHONUCLEAR CELLS BODY FLUID: 12.2 %
POTASSIUM REFLEX MAGNESIUM: 5.6 MEQ/L (ref 3.5–5.2)
PROTEIN FLUID: 1.3 GM/DL
PROTEIN UA: 100 MG/DL
RBC # BLD: 3.72 MILL/MM3 (ref 4.2–5.4)
RBC URINE: ABNORMAL /HPF
RENAL EPITHELIAL, UA: ABNORMAL
SEG NEUTROPHILS: 81.8 %
SEGMENTED NEUTROPHILS ABSOLUTE COUNT: 8.8 THOU/MM3 (ref 1.8–7.7)
SODIUM BLD-SCNC: 129 MEQ/L (ref 135–145)
SODIUM URINE: < 20 MEQ/L
SPECIFIC GRAVITY UA: 1.01 (ref 1–1.03)
SPECIMEN: NORMAL
TOTAL NUCLEATED CELLS BODY FLUID: 66 /CUMM (ref 0–500)
TOTAL VOLUME RECEIVED BODY FLUID: 70 ML
UREA NITROGEN, UR: 343 MG/DL
UROBILINOGEN, URINE: 1 EU/DL (ref 0–1)
WBC # BLD: 10.7 THOU/MM3 (ref 4.8–10.8)
WBC UA: > 200 /HPF
YEAST: ABNORMAL

## 2020-01-15 PROCEDURE — 2060000000 HC ICU INTERMEDIATE R&B

## 2020-01-15 PROCEDURE — 0W9G3ZZ DRAINAGE OF PERITONEAL CAVITY, PERCUTANEOUS APPROACH: ICD-10-PCS | Performed by: RADIOLOGY

## 2020-01-15 PROCEDURE — 76770 US EXAM ABDO BACK WALL COMP: CPT

## 2020-01-15 PROCEDURE — 87147 CULTURE TYPE IMMUNOLOGIC: CPT

## 2020-01-15 PROCEDURE — 49083 ABD PARACENTESIS W/IMAGING: CPT

## 2020-01-15 PROCEDURE — 6370000000 HC RX 637 (ALT 250 FOR IP): Performed by: INTERNAL MEDICINE

## 2020-01-15 PROCEDURE — 87075 CULTR BACTERIA EXCEPT BLOOD: CPT

## 2020-01-15 PROCEDURE — 87205 SMEAR GRAM STAIN: CPT

## 2020-01-15 PROCEDURE — 82570 ASSAY OF URINE CREATININE: CPT

## 2020-01-15 PROCEDURE — 84157 ASSAY OF PROTEIN OTHER: CPT

## 2020-01-15 PROCEDURE — 36415 COLL VENOUS BLD VENIPUNCTURE: CPT

## 2020-01-15 PROCEDURE — 6370000000 HC RX 637 (ALT 250 FOR IP): Performed by: PHYSICIAN ASSISTANT

## 2020-01-15 PROCEDURE — 80048 BASIC METABOLIC PNL TOTAL CA: CPT

## 2020-01-15 PROCEDURE — 83935 ASSAY OF URINE OSMOLALITY: CPT

## 2020-01-15 PROCEDURE — 82948 REAGENT STRIP/BLOOD GLUCOSE: CPT

## 2020-01-15 PROCEDURE — 84300 ASSAY OF URINE SODIUM: CPT

## 2020-01-15 PROCEDURE — 87077 CULTURE AEROBIC IDENTIFY: CPT

## 2020-01-15 PROCEDURE — 87186 SC STD MICRODIL/AGAR DIL: CPT

## 2020-01-15 PROCEDURE — 2580000003 HC RX 258: Performed by: INTERNAL MEDICINE

## 2020-01-15 PROCEDURE — 87070 CULTURE OTHR SPECIMN AEROBIC: CPT

## 2020-01-15 PROCEDURE — 2580000003 HC RX 258: Performed by: PHYSICIAN ASSISTANT

## 2020-01-15 PROCEDURE — 81001 URINALYSIS AUTO W/SCOPE: CPT

## 2020-01-15 PROCEDURE — 6360000002 HC RX W HCPCS: Performed by: PHYSICIAN ASSISTANT

## 2020-01-15 PROCEDURE — 6360000002 HC RX W HCPCS: Performed by: INTERNAL MEDICINE

## 2020-01-15 PROCEDURE — 2709999900 HC NON-CHARGEABLE SUPPLY

## 2020-01-15 PROCEDURE — 74018 RADEX ABDOMEN 1 VIEW: CPT

## 2020-01-15 PROCEDURE — 89050 BODY FLUID CELL COUNT: CPT

## 2020-01-15 PROCEDURE — 85025 COMPLETE CBC W/AUTO DIFF WBC: CPT

## 2020-01-15 PROCEDURE — 99254 IP/OBS CNSLTJ NEW/EST MOD 60: CPT | Performed by: INTERNAL MEDICINE

## 2020-01-15 PROCEDURE — 84540 ASSAY OF URINE/UREA-N: CPT

## 2020-01-15 PROCEDURE — 99233 SBSQ HOSP IP/OBS HIGH 50: CPT | Performed by: INTERNAL MEDICINE

## 2020-01-15 RX ORDER — INSULIN GLARGINE 100 [IU]/ML
30 INJECTION, SOLUTION SUBCUTANEOUS 2 TIMES DAILY
Status: DISCONTINUED | OUTPATIENT
Start: 2020-01-15 | End: 2020-01-15

## 2020-01-15 RX ORDER — INSULIN GLARGINE 100 [IU]/ML
20 INJECTION, SOLUTION SUBCUTANEOUS 2 TIMES DAILY
Status: DISCONTINUED | OUTPATIENT
Start: 2020-01-15 | End: 2020-01-17

## 2020-01-15 RX ADMIN — INSULIN GLARGINE 20 UNITS: 100 INJECTION, SOLUTION SUBCUTANEOUS at 12:17

## 2020-01-15 RX ADMIN — MIDODRINE HYDROCHLORIDE 15 MG: 10 TABLET ORAL at 12:17

## 2020-01-15 RX ADMIN — MIDODRINE HYDROCHLORIDE 15 MG: 10 TABLET ORAL at 09:40

## 2020-01-15 RX ADMIN — LACTULOSE 20 G: 20 SOLUTION ORAL at 09:41

## 2020-01-15 RX ADMIN — ONDANSETRON 4 MG: 2 INJECTION INTRAMUSCULAR; INTRAVENOUS at 06:04

## 2020-01-15 RX ADMIN — ONDANSETRON 4 MG: 2 INJECTION INTRAMUSCULAR; INTRAVENOUS at 12:20

## 2020-01-15 RX ADMIN — RIFAXIMIN 550 MG: 550 TABLET ORAL at 09:40

## 2020-01-15 RX ADMIN — ONDANSETRON 4 MG: 2 INJECTION INTRAMUSCULAR; INTRAVENOUS at 20:53

## 2020-01-15 RX ADMIN — CEFTRIAXONE SODIUM 1 G: 1 INJECTION, POWDER, FOR SOLUTION INTRAMUSCULAR; INTRAVENOUS at 13:57

## 2020-01-15 RX ADMIN — INSULIN LISPRO 4 UNITS: 100 INJECTION, SOLUTION INTRAVENOUS; SUBCUTANEOUS at 17:05

## 2020-01-15 RX ADMIN — LEVOTHYROXINE SODIUM 300 MCG: 150 TABLET ORAL at 05:52

## 2020-01-15 RX ADMIN — SERTRALINE 50 MG: 50 TABLET, FILM COATED ORAL at 09:40

## 2020-01-15 RX ADMIN — RIFAXIMIN 550 MG: 550 TABLET ORAL at 20:59

## 2020-01-15 RX ADMIN — Medication 10 ML: at 20:56

## 2020-01-15 RX ADMIN — INSULIN GLARGINE 20 UNITS: 100 INJECTION, SOLUTION SUBCUTANEOUS at 20:53

## 2020-01-15 RX ADMIN — INSULIN LISPRO 4 UNITS: 100 INJECTION, SOLUTION INTRAVENOUS; SUBCUTANEOUS at 12:17

## 2020-01-15 RX ADMIN — INSULIN LISPRO 6 UNITS: 100 INJECTION, SOLUTION INTRAVENOUS; SUBCUTANEOUS at 09:42

## 2020-01-15 RX ADMIN — INSULIN LISPRO 3 UNITS: 100 INJECTION, SOLUTION INTRAVENOUS; SUBCUTANEOUS at 20:54

## 2020-01-15 RX ADMIN — SODIUM CHLORIDE: 9 INJECTION, SOLUTION INTRAVENOUS at 12:18

## 2020-01-15 RX ADMIN — MIDODRINE HYDROCHLORIDE 15 MG: 10 TABLET ORAL at 17:05

## 2020-01-15 ASSESSMENT — ENCOUNTER SYMPTOMS
BACK PAIN: 0
EYE PAIN: 0
VOMITING: 0
COUGH: 0
SHORTNESS OF BREATH: 0
NAUSEA: 1
ABDOMINAL PAIN: 1
EYES NEGATIVE: 1

## 2020-01-15 ASSESSMENT — PAIN SCALES - GENERAL
PAINLEVEL_OUTOF10: 0
PAINLEVEL_OUTOF10: 0
PAINLEVEL_OUTOF10: 5

## 2020-01-15 ASSESSMENT — PAIN DESCRIPTION - FREQUENCY: FREQUENCY: INTERMITTENT

## 2020-01-15 ASSESSMENT — PAIN DESCRIPTION - ORIENTATION: ORIENTATION: MID

## 2020-01-15 ASSESSMENT — PAIN DESCRIPTION - LOCATION: LOCATION: ABDOMEN

## 2020-01-15 ASSESSMENT — PAIN DESCRIPTION - PAIN TYPE: TYPE: ACUTE PAIN

## 2020-01-15 ASSESSMENT — PAIN DESCRIPTION - DESCRIPTORS: DESCRIPTORS: ACHING

## 2020-01-15 NOTE — PROGRESS NOTES
Hospitalist Progress Note    Patient:  Ursula Suarez      Unit/Bed:4K-15/015-A    YOB: 1960    MRN: [id number]       Acct: [de-identified]     PCP: Ayleen Bejarano PA-C    Date of Admission: 1/14/2020    Active Hospital Problems    Diagnosis Date Noted    Hyperglycemia [R73.9] 01/14/2020    Thrombocytopenia ([address] [D69.6] 10/04/2019    Decompensation of cirrhosis of liver ([address] [K72.90]     Generalized abdominal pain [R10.84]     Coagulopathy ([address] [D68.9]     Morbid obesity ([address] [E66.01] 65/19/0217    Alcoholic cirrhosis of liver with ascites ([facility] [K70.31] 05/16/2019       Assessment/Plan:    1. Type 2 diabetes mellitus uncontrolled, hyperosmolar hyperglycemic without coma: presented with blood sugars in 1000s. Pt has known uncontrolled Type 2 DM. Admits to not have been taking her insulin at home. Home dose is Lantus 30 BID + Humalog sliding scale. Pt started on insulin drip, weaned off and started Lantus 20U BID + Humalog Sliding scale. No evidence of DKA or elevated AG. Accu-checks. Diabetic Diet. 2. Alcoholic Decompensated Liver Cirrhosis with Ascites: 2/2 to alcohol abuse. Pt gets weekly paracentesis. Pt complaining of abdominal pain, will order paracentesis with SBP rule out. Resume Lactulose and Rifaximin. Hold Bumex and Aldactone given RAMBO. Consult GI. 3. Abdominal Pain: complaining of RUQ and diffuse abdominal pain. No fevers or evidence of SIRS. Will need to rule out SBP. Pending paracentesis. 4. RAMBO on CKD Stage 3: baseline Cr 2.0-2.2. On admission elevated to 3.0, continues to get worse to 3.3 with IV fluids. HRS? Pending urine lytes and renal U/S. Pending UA. Appreciate Nephrology assistance. Avoid nephrotoxic agents. Strict I/Os. Daily weights. 5. Hyponatremia: likely hypervolemic secondary to cirrhosis. Na 129 today. Nephrology to assist.     6. Chronic Hypotension: 2/2 to cirrhosis with portal hypotension. Cont Midodrine.  No evidence of infection/SIRS at this time. Chief Complaint: weakness, nausea, decreased PO intake, abdominal pain     Hospital Course: 62year old female with pmh alcoholic liver cirrhosis, CKD III, uncontrolled DM2 who presents from [facility] with symptoms of nausea, decreased PO intake, weakness, and abdominal pain. Pt found to have blood sugars in 1000's, not in DKA. Pt admits to not to be taking her insulin at home. Started on insulin drip and admitted. Subjective: this morning pt feeling nauseas with abdominal pain. No vomiting. Feels weak in bed. Denies any chest pain, dizziness, lightheadedness, cough, fevers or chills. Medications:  Reviewed    Infusion Medications    dextrose      sodium chloride 75 mL/hr at 01/14/20 2041     Scheduled Medications    insulin glargine  20 Units Subcutaneous BID    [Held by provider] bumetanide  1 mg Oral Daily    lactulose  20 g Oral Daily    levothyroxine  300 mcg Oral Daily    midodrine  15 mg Oral TID WC    rifaximin  550 mg Oral BID    sertraline  50 mg Oral Daily    [Held by provider] spironolactone  50 mg Oral Daily    phosphorus replacement protocol   Other RX Placeholder    sodium chloride flush  10 mL Intravenous 2 times per day    insulin lispro  0-12 Units Subcutaneous TID WC    insulin lispro  0-6 Units Subcutaneous Nightly     PRN Meds: sodium chloride flush, ondansetron, potassium chloride **OR** potassium alternative oral replacement **OR** potassium chloride, polyethylene glycol, insulin regular, dextrose, glucose, glucagon (rDNA), dextrose      Intake/Output Summary (Last 24 hours) at 1/15/2020 0932  Last data filed at 1/15/2020 0343  Gross per 24 hour   Intake 1529.29 ml   Output 300 ml   Net 1229.29 ml       Diet:  DIET LOW SODIUM 2 GM;     Exam:  BP (!) 96/51   Pulse 66   Temp 97.5 °F (36.4 °C) (Oral)   Resp 16   Ht 5' 4\" (1.626 m)   Wt 252 lb 4.8 oz (114.4 kg)   SpO2 94%   BMI 43.31 kg/m²     General appearance: chronically ill appearing

## 2020-01-15 NOTE — PROGRESS NOTES
Formulation and discussion of sedation / procedure plans, risks, benefits, side effects and alternatives with patient and/or responsible adult completed.     Electronically signed by Sarah Contreras MD on 1/15/2020 at 1:16 PM

## 2020-01-15 NOTE — CONSULTS
AMPUTATION Left     HEEL SPUR SURGERY Right 1992    HYSTERECTOMY      PARACENTESIS      UPPER GASTROINTESTINAL ENDOSCOPY Left 8/23/2019    EGD BIOPSY performed by Andrew Romo MD at [facility]     Social History     Socioeconomic History    Marital status: Legally      Spouse name: Not on file    Number of children: Not on file    Years of education: Not on file    Highest education level: Not on file   Occupational History    Not on file   Social Needs    Financial resource strain: Not on file    Food insecurity:     Worry: Not on file     Inability: Not on file    Transportation needs:     Medical: Not on file     Non-medical: Not on file   Tobacco Use    Smoking status: Current Every Day Smoker     Packs/day: 0.25     Years: 40.00     Pack years: 10.00     Types: Cigarettes    Smokeless tobacco: Never Used    Tobacco comment: 5 - 7 cigaretes a day   Substance and Sexual Activity    Alcohol use: Not Currently     Comment: hx of alchol since [address] to [facility] [address] Drug use: Not Currently     Comment: 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    Sexual activity: Not Currently   Lifestyle    Physical activity:     Days per week: Not on file     Minutes per session: Not on file    Stress: Not on file   Relationships    Social connections:     Talks on phone: Not on file     Gets together: Not on file     Attends [employer] service: Not on file     Active member of club or organization: Not on file     Attends meetings of clubs or organizations: Not on file     Relationship status: Not on file    Intimate partner violence:     Fear of current or ex partner: Not on file     Emotionally abused: Not on file     Physically abused: Not on file     Forced sexual activity: Not on file   Other Topics Concern    Not on file   Social History Narrative    Not on file     Family History   Problem Relation Age of Onset    Diabetes Mother     Hypertension Mother    Lilliam Obey Diabetes Father    Lilliam Obey levothyroxine  300 mcg Oral Daily    midodrine  15 mg Oral TID     rifaximin  550 mg Oral BID    sertraline  50 mg Oral Daily    [Held by provider] spironolactone  50 mg Oral Daily    phosphorus replacement protocol   Other RX Placeholder    sodium chloride flush  10 mL Intravenous 2 times per day    insulin glargine  20 Units Subcutaneous BID    insulin lispro  0-12 Units Subcutaneous TID     insulin lispro  0-6 Units Subcutaneous Nightly     Continuous Infusions:   dextrose      sodium chloride 75 mL/hr at 01/14/20 2041     Review of Systems Physical Exam   Review of Systems   Constitutional: Positive for activity change, appetite change and fatigue. Negative for chills and fever. HENT: Negative. Eyes: Negative. Negative for pain. Respiratory: Negative for cough and shortness of breath. Cardiovascular: Negative for chest pain and leg swelling. Gastrointestinal: Positive for abdominal pain and nausea. Negative for vomiting. Genitourinary: Negative. Negative for dysuria, frequency and hematuria. Musculoskeletal: Negative for back pain and neck pain. Skin: Negative for rash and wound. Neurological: Negative for dizziness, light-headedness and headaches. Psychiatric/Behavioral: Negative for agitation and confusion. Physical Exam  Vitals signs reviewed. Constitutional:       General: She is not in acute distress. Appearance: She is not diaphoretic. HENT:      Head: Normocephalic and atraumatic. Right Ear: External ear normal.      Left Ear: External ear normal.      Nose: Nose normal.      Mouth/Throat:      Mouth: Mucous membranes are dry. Eyes:      General: No scleral icterus. Right eye: No discharge. Left eye: No discharge. Conjunctiva/sclera: Conjunctivae normal.   Neck:      Musculoskeletal: Normal range of motion and neck supple. Thyroid: No thyromegaly. Vascular: No JVD.    Cardiovascular:      Rate and Rhythm: Normal rate and secondary to renal dysfunction and inability to excrete free water  Hyperkalemia secondary to above  Metabolic acidosis bicarbonate of 17, cannot rule out a component of respiratory alkalosis due to hepatic cirrhosis  Hypotension on Midodrin  Liver cirrhosis with ascites failed paracentesis  Diabetes mellitus  meds reviewed and D/W patient and the nursing staff      **This report has been created using voice recognition software. It maycontain minor  errors which are inherent in voice recognition technology. **    Jd Cole M.D  [facility].

## 2020-01-15 NOTE — CONSULTS
HYSTERECTOMY      PARACENTESIS      UPPER GASTROINTESTINAL ENDOSCOPY Left 8/23/2019    EGD BIOPSY performed by Alissa Faust MD at [facility]       Family History:  Family History   Problem Relation Age of Onset    Diabetes Mother     Hypertension Mother     Diabetes Father     Hypertension Father     Cancer Sister     Hypertension Sister     Hypertension Brother        Past GI History:  Alcoholic liver cirrhosis  Dr. Marcelo Sánchez patient    Allergies:  Gabapentin; Hydrocodone-acetaminophen; Naproxen; Pentazocine; Rofecoxib; and Sulfamethoxazole-trimethoprim    Social History:   TOBACCO:   reports that she has been smoking cigarettes. She has a 10.00 pack-year smoking history. She has never used smokeless tobacco.  ETOH:   reports previous alcohol use. Review Of Systems  GENERAL: No fever, chills or weight loss. EYES:  No  blurred vision, double vision   CARDIOVASCULAR: No chest pain or palpitations. RESPIRATORY:  No dyspnea or cough. GI:  See HPI  MUSCULOSKELETAL: No new painful or swollen joints or myalgias. :   No dysuria or hematuria. SKIN:  No rashes or jaundice. NEUROLOGIC:  No headaches or seizures, numbness or tingling of arms, or legs. PSYCH:  No anxiety or depression. ENDOCRINE:  +hyperglycemia  BLOOD:  No anemia, bleeding disorder, blood or blood product transfusion. PHYSICAL EXAM:  BP (!) 104/54   Pulse 65   Temp 97.6 °F (36.4 °C) (Oral)   Resp 18   Ht 5' 4\" (1.626 m)   Wt 252 lb 4.8 oz (114.4 kg)   SpO2 97%   BMI 43.31 kg/m²     General appearance: Chronically ill appearing female  HEENT: Normal cephalic, atraumatic without obvious deformity. Pupils equal, round, and reactive to light. Neck: Supple, with full range of motion. No jugular venous distention. Trachea midline. Respiratory:  Normal respiratory effort. Clear to auscultation, bilaterally without Rales/Wheezes/Rhonchi.   Cardiovascular: Regular rate and rhythm without

## 2020-01-15 NOTE — FLOWSHEET NOTE
Shift report received from [facility].   Electronically signed by Claudia Marte on 1/15/2020 at 9:22 AM

## 2020-01-16 ENCOUNTER — APPOINTMENT (OUTPATIENT)
Dept: CT IMAGING | Age: 60
DRG: 420 | End: 2020-01-16
Attending: INTERNAL MEDICINE
Payer: MEDICAID

## 2020-01-16 LAB
ALBUMIN SERPL-MCNC: 2.6 G/DL (ref 3.5–5.1)
ALP BLD-CCNC: 136 U/L (ref 38–126)
ALT SERPL-CCNC: 27 U/L (ref 11–66)
ANION GAP SERPL CALCULATED.3IONS-SCNC: 9 MEQ/L (ref 8–16)
AST SERPL-CCNC: 38 U/L (ref 5–40)
BASOPHILS # BLD: 0.5 %
BASOPHILS ABSOLUTE: 0 THOU/MM3 (ref 0–0.1)
BILIRUB SERPL-MCNC: 0.7 MG/DL (ref 0.3–1.2)
BILIRUBIN DIRECT: 0.3 MG/DL (ref 0–0.3)
BUN BLDV-MCNC: 33 MG/DL (ref 7–22)
CALCIUM SERPL-MCNC: 8.3 MG/DL (ref 8.5–10.5)
CHLORIDE BLD-SCNC: 101 MEQ/L (ref 98–111)
CO2: 17 MEQ/L (ref 23–33)
CREAT SERPL-MCNC: 3.5 MG/DL (ref 0.4–1.2)
EOSINOPHIL # BLD: 1.3 %
EOSINOPHILS ABSOLUTE: 0.1 THOU/MM3 (ref 0–0.4)
ERYTHROCYTE [DISTWIDTH] IN BLOOD BY AUTOMATED COUNT: 14.3 % (ref 11.5–14.5)
ERYTHROCYTE [DISTWIDTH] IN BLOOD BY AUTOMATED COUNT: 50 FL (ref 35–45)
GFR SERPL CREATININE-BSD FRML MDRD: 13 ML/MIN/1.73M2
GLUCOSE BLD-MCNC: 162 MG/DL (ref 70–108)
GLUCOSE BLD-MCNC: 168 MG/DL (ref 70–108)
GLUCOSE BLD-MCNC: 180 MG/DL (ref 70–108)
GLUCOSE BLD-MCNC: 185 MG/DL (ref 70–108)
GLUCOSE BLD-MCNC: 186 MG/DL (ref 70–108)
GLUCOSE BLD-MCNC: 212 MG/DL (ref 70–108)
HCT VFR BLD CALC: 35.2 % (ref 37–47)
HEMOGLOBIN: 11.6 GM/DL (ref 12–16)
IMMATURE GRANS (ABS): 0.13 THOU/MM3 (ref 0–0.07)
IMMATURE GRANULOCYTES: 1.5 %
INR BLD: 1.39 (ref 0.85–1.13)
LYMPHOCYTES # BLD: 9 %
LYMPHOCYTES ABSOLUTE: 0.8 THOU/MM3 (ref 1–4.8)
MAGNESIUM: 2.1 MG/DL (ref 1.6–2.4)
MCH RBC QN AUTO: 31.6 PG (ref 26–33)
MCHC RBC AUTO-ENTMCNC: 33 GM/DL (ref 32.2–35.5)
MCV RBC AUTO: 95.9 FL (ref 81–99)
MONOCYTES # BLD: 8 %
MONOCYTES ABSOLUTE: 0.7 THOU/MM3 (ref 0.4–1.3)
MRSA SCREEN: NORMAL
NUCLEATED RED BLOOD CELLS: 0 /100 WBC
PH VENOUS: 7.33 (ref 7.31–7.41)
PLATELET # BLD: 98 THOU/MM3 (ref 130–400)
PMV BLD AUTO: 9.7 FL (ref 9.4–12.4)
POTASSIUM SERPL-SCNC: 5.5 MEQ/L (ref 3.5–5.2)
RBC # BLD: 3.67 MILL/MM3 (ref 4.2–5.4)
SEG NEUTROPHILS: 79.7 %
SEGMENTED NEUTROPHILS ABSOLUTE COUNT: 7 THOU/MM3 (ref 1.8–7.7)
SODIUM BLD-SCNC: 127 MEQ/L (ref 135–145)
TOTAL PROTEIN: 5.2 G/DL (ref 6.1–8)
WBC # BLD: 8.8 THOU/MM3 (ref 4.8–10.8)

## 2020-01-16 PROCEDURE — 36415 COLL VENOUS BLD VENIPUNCTURE: CPT

## 2020-01-16 PROCEDURE — 6370000000 HC RX 637 (ALT 250 FOR IP): Performed by: PHYSICIAN ASSISTANT

## 2020-01-16 PROCEDURE — 2580000003 HC RX 258: Performed by: PHYSICIAN ASSISTANT

## 2020-01-16 PROCEDURE — 82948 REAGENT STRIP/BLOOD GLUCOSE: CPT

## 2020-01-16 PROCEDURE — 2060000000 HC ICU INTERMEDIATE R&B

## 2020-01-16 PROCEDURE — 6370000000 HC RX 637 (ALT 250 FOR IP): Performed by: NURSE PRACTITIONER

## 2020-01-16 PROCEDURE — 2580000003 HC RX 258: Performed by: INTERNAL MEDICINE

## 2020-01-16 PROCEDURE — 99233 SBSQ HOSP IP/OBS HIGH 50: CPT | Performed by: INTERNAL MEDICINE

## 2020-01-16 PROCEDURE — 82140 ASSAY OF AMMONIA: CPT

## 2020-01-16 PROCEDURE — 6360000002 HC RX W HCPCS: Performed by: PHYSICIAN ASSISTANT

## 2020-01-16 PROCEDURE — 80053 COMPREHEN METABOLIC PANEL: CPT

## 2020-01-16 PROCEDURE — 6360000002 HC RX W HCPCS: Performed by: INTERNAL MEDICINE

## 2020-01-16 PROCEDURE — 83735 ASSAY OF MAGNESIUM: CPT

## 2020-01-16 PROCEDURE — 85610 PROTHROMBIN TIME: CPT

## 2020-01-16 PROCEDURE — 82248 BILIRUBIN DIRECT: CPT

## 2020-01-16 PROCEDURE — 82800 BLOOD PH: CPT

## 2020-01-16 PROCEDURE — 6370000000 HC RX 637 (ALT 250 FOR IP): Performed by: INTERNAL MEDICINE

## 2020-01-16 PROCEDURE — 2709999900 HC NON-CHARGEABLE SUPPLY

## 2020-01-16 PROCEDURE — 74176 CT ABD & PELVIS W/O CONTRAST: CPT

## 2020-01-16 PROCEDURE — P9047 ALBUMIN (HUMAN), 25%, 50ML: HCPCS | Performed by: INTERNAL MEDICINE

## 2020-01-16 PROCEDURE — 70450 CT HEAD/BRAIN W/O DYE: CPT

## 2020-01-16 PROCEDURE — 85025 COMPLETE CBC W/AUTO DIFF WBC: CPT

## 2020-01-16 RX ORDER — ALBUMIN, HUMAN INJ 5% 5 %
25 SOLUTION INTRAVENOUS ONCE
Status: DISCONTINUED | OUTPATIENT
Start: 2020-01-16 | End: 2020-01-16

## 2020-01-16 RX ORDER — TRAMADOL HYDROCHLORIDE 50 MG/1
50 TABLET ORAL EVERY 6 HOURS PRN
Status: DISCONTINUED | OUTPATIENT
Start: 2020-01-16 | End: 2020-01-22 | Stop reason: HOSPADM

## 2020-01-16 RX ORDER — OCTREOTIDE ACETATE 100 UG/ML
100 INJECTION, SOLUTION INTRAVENOUS; SUBCUTANEOUS EVERY 6 HOURS
Status: DISCONTINUED | OUTPATIENT
Start: 2020-01-16 | End: 2020-01-22 | Stop reason: HOSPADM

## 2020-01-16 RX ORDER — ALBUMIN (HUMAN) 12.5 G/50ML
25 SOLUTION INTRAVENOUS EVERY 6 HOURS
Status: DISCONTINUED | OUTPATIENT
Start: 2020-01-16 | End: 2020-01-22 | Stop reason: HOSPADM

## 2020-01-16 RX ORDER — SUCRALFATE 1 G/1
1 TABLET ORAL
Status: DISCONTINUED | OUTPATIENT
Start: 2020-01-16 | End: 2020-01-19 | Stop reason: ALTCHOICE

## 2020-01-16 RX ORDER — ALBUMIN (HUMAN) 12.5 G/50ML
25 SOLUTION INTRAVENOUS ONCE
Status: COMPLETED | OUTPATIENT
Start: 2020-01-16 | End: 2020-01-16

## 2020-01-16 RX ADMIN — Medication 10 ML: at 10:10

## 2020-01-16 RX ADMIN — RIFAXIMIN 550 MG: 550 TABLET ORAL at 21:46

## 2020-01-16 RX ADMIN — ALBUMIN (HUMAN) 25 G: 0.25 INJECTION, SOLUTION INTRAVENOUS at 12:40

## 2020-01-16 RX ADMIN — RIFAXIMIN 550 MG: 550 TABLET ORAL at 10:09

## 2020-01-16 RX ADMIN — ONDANSETRON 4 MG: 2 INJECTION INTRAMUSCULAR; INTRAVENOUS at 06:03

## 2020-01-16 RX ADMIN — CEFTRIAXONE SODIUM 1 G: 1 INJECTION, POWDER, FOR SOLUTION INTRAMUSCULAR; INTRAVENOUS at 14:47

## 2020-01-16 RX ADMIN — INSULIN LISPRO 2 UNITS: 100 INJECTION, SOLUTION INTRAVENOUS; SUBCUTANEOUS at 10:10

## 2020-01-16 RX ADMIN — INSULIN LISPRO 2 UNITS: 100 INJECTION, SOLUTION INTRAVENOUS; SUBCUTANEOUS at 12:00

## 2020-01-16 RX ADMIN — INSULIN LISPRO 1 UNITS: 100 INJECTION, SOLUTION INTRAVENOUS; SUBCUTANEOUS at 21:43

## 2020-01-16 RX ADMIN — MIDODRINE HYDROCHLORIDE 15 MG: 10 TABLET ORAL at 17:54

## 2020-01-16 RX ADMIN — OCTREOTIDE ACETATE 100 MCG: 100 INJECTION, SOLUTION INTRAVENOUS; SUBCUTANEOUS at 16:02

## 2020-01-16 RX ADMIN — SUCRALFATE 1 G: 1 TABLET ORAL at 21:46

## 2020-01-16 RX ADMIN — SERTRALINE 50 MG: 50 TABLET, FILM COATED ORAL at 10:10

## 2020-01-16 RX ADMIN — MIDODRINE HYDROCHLORIDE 15 MG: 10 TABLET ORAL at 10:09

## 2020-01-16 RX ADMIN — MIDODRINE HYDROCHLORIDE 15 MG: 10 TABLET ORAL at 12:47

## 2020-01-16 RX ADMIN — INSULIN GLARGINE 20 UNITS: 100 INJECTION, SOLUTION SUBCUTANEOUS at 10:10

## 2020-01-16 RX ADMIN — ALBUMIN (HUMAN) 25 G: 0.25 INJECTION, SOLUTION INTRAVENOUS at 17:44

## 2020-01-16 RX ADMIN — LACTULOSE 20 G: 20 SOLUTION ORAL at 10:10

## 2020-01-16 RX ADMIN — TRAMADOL HYDROCHLORIDE 50 MG: 50 TABLET, FILM COATED ORAL at 17:41

## 2020-01-16 RX ADMIN — ONDANSETRON 4 MG: 2 INJECTION INTRAMUSCULAR; INTRAVENOUS at 14:46

## 2020-01-16 RX ADMIN — LEVOTHYROXINE SODIUM 300 MCG: 150 TABLET ORAL at 06:04

## 2020-01-16 RX ADMIN — INSULIN GLARGINE 20 UNITS: 100 INJECTION, SOLUTION SUBCUTANEOUS at 21:42

## 2020-01-16 RX ADMIN — SODIUM CHLORIDE: 9 INJECTION, SOLUTION INTRAVENOUS at 00:51

## 2020-01-16 RX ADMIN — SUCRALFATE 1 G: 1 TABLET ORAL at 17:44

## 2020-01-16 RX ADMIN — INSULIN LISPRO 2 UNITS: 100 INJECTION, SOLUTION INTRAVENOUS; SUBCUTANEOUS at 17:38

## 2020-01-16 RX ADMIN — Medication 10 ML: at 21:46

## 2020-01-16 RX ADMIN — OCTREOTIDE ACETATE 100 MCG: 100 INJECTION, SOLUTION INTRAVENOUS; SUBCUTANEOUS at 21:45

## 2020-01-16 ASSESSMENT — PAIN SCALES - GENERAL
PAINLEVEL_OUTOF10: 9
PAINLEVEL_OUTOF10: 0
PAINLEVEL_OUTOF10: 9
PAINLEVEL_OUTOF10: 0
PAINLEVEL_OUTOF10: 0

## 2020-01-16 ASSESSMENT — PAIN DESCRIPTION - LOCATION: LOCATION: ABDOMEN

## 2020-01-16 ASSESSMENT — PAIN DESCRIPTION - ORIENTATION: ORIENTATION: MID

## 2020-01-16 NOTE — PROGRESS NOTES
Hospitalist Progress Note    Patient:  Yamini Morrissey      Unit/Bed:4K-15/015-A    YOB: 1960    MRN: [id number]       Acct: [de-identified]     PCP: Cristal Starr PA-C    Date of Admission: 1/14/2020    Active Hospital Problems    Diagnosis Date Noted    Hyperglycemia [R73.9] 01/14/2020    Thrombocytopenia ([address] [D69.6] 10/04/2019    Decompensation of cirrhosis of liver ([address] [K72.90]     Generalized abdominal pain [R10.84]     Coagulopathy ([address] [D68.9]     Morbid obesity ([address] [E66.01] 14/70/4795    Alcoholic cirrhosis of liver with ascites ([facility] [K70.31] 05/16/2019          Assessment/Plan:     1. Type 2 diabetes mellitus uncontrolled, hyperosmolar hyperglycemic without coma: presented with blood sugars in 1000s. Pt has known uncontrolled Type 2 DM. Admits to not have been taking her insulin at home. Home dose is Lantus 30 BID + Humalog sliding scale. Pt started on insulin drip, weaned off and started Lantus 20U BID + Humalog Sliding scale. No evidence of DKA or elevated AG. Accu-checks. Diabetic Diet.       2. Alcoholic Decompensated Liver Cirrhosis with Ascites: 2/2 to alcohol abuse. Pt gets weekly paracentesis. Pt complaining of abdominal pain, paracentesis on 1/15 with removal of 8L, negative for SBP. Resume Lactulose and Rifaximin. Hold Bumex and Aldactone given RAMBO. Consult GI.      3. Abdominal Pain: complaining of RUQ and diffuse abdominal pain. No fevers or evidence of SIRS. Paracentesis on 1/15 with removal of 8L, negative for SBP. Having nausea and vomiting. Abdominal Xray no ileus or obstruction. Still having nausea and vomiting. Pending CT A/P.      4. RAMBO on CKD Stage 3: baseline Cr 2.0-2.2. On admission elevated to 3.0, continues to get worse to 3.3 and 3.5 with IV fluids. HRS? Appreciate Nephrology assistance. Avoid nephrotoxic agents. Strict I/Os. Daily weights.      5. Hyponatremia: likely hypervolemic secondary to cirrhosis. Na 129 today.  Nephrology to assist.    6. Chronic Hypotension: 2/2 to cirrhosis with portal hypotension. Cont Midodrine. No evidence of infection/SIRS at this time. 7. Complicated UTI: UA grossly abnormal with +LE, many bacteria and few yeasts. Start IV Rocephin. Dispo: pt having nausea, vomiting and abdominal pain. Pending CT A/P. Kidney function continues to get worse. Nephrology managing. GI on board. Poor overall prognosis. Chief Complaint: weakness, nausea, decreased PO intake, abdominal pain     Hospital Course: 62year old female with pmh alcoholic liver cirrhosis, CKD III, uncontrolled DM2 who presents from [facility] with symptoms of nausea, decreased PO intake, weakness, and abdominal pain. Pt found to have blood sugars in 1000's, not in DKA. Pt admits to not to be taking her insulin at home. Started on insulin drip and admitted. Subjective: when seen this morning pt having nausea and vomiting. Also complaining of abdominal pain. No blood in stool/urine. No fevers or chills. No chest pain.        Medications:  Reviewed    Infusion Medications    dextrose      sodium chloride 75 mL/hr at 01/16/20 0051     Scheduled Medications    albumin human  25 g Intravenous Once    insulin glargine  20 Units Subcutaneous BID    cefTRIAXone (ROCEPHIN) IV  1 g Intravenous Q24H    [Held by provider] bumetanide  1 mg Oral Daily    lactulose  20 g Oral Daily    levothyroxine  300 mcg Oral Daily    midodrine  15 mg Oral TID WC    rifaximin  550 mg Oral BID    sertraline  50 mg Oral Daily    [Held by provider] spironolactone  50 mg Oral Daily    phosphorus replacement protocol   Other RX Placeholder    sodium chloride flush  10 mL Intravenous 2 times per day    insulin lispro  0-12 Units Subcutaneous TID     insulin lispro  0-6 Units Subcutaneous Nightly     PRN Meds: traMADol, sodium chloride flush, ondansetron, potassium chloride **OR** potassium alternative oral replacement **OR** potassium chloride, polyethylene Results   Component Value Date    NITRU NEGATIVE 01/15/2020    WBCUA > 200 01/15/2020    BACTERIA MANY 01/15/2020    RBCUA 25-50 01/15/2020    BLOODU LARGE 01/15/2020    SPECGRAV 1.015 01/15/2020    GLUCOSEU >= 1000 11/21/2019       Radiology:   All imaging reviewed     Diet: DIET LOW SODIUM 2 GM; 1500 ml      Code Status: Full Code            Electronically signed by Jeannette Meigs, MD on 1/16/2020 at 11:14 AM

## 2020-01-16 NOTE — PROGRESS NOTES
Gastroenterology Progress Note:     Patient Name:  Mp Hancock   MRN: [id number]  [id number]  YOB: 1960  Admit Date: 1/14/2020  2:55 AM  Primary Care Physician: Gwyneth Hamman, PA-C   4K-15/015-A     Patient seen and examined. 24 hours events and chart reviewed. Subjective: Patient resting in bed. She continues to have abdominal pain and nausea. Objective:  /65   Pulse 75   Temp 97.6 °F (36.4 °C) (Oral)   Resp 18   Ht 5' 4\" (1.626 m)   Wt 233 lb 6.4 oz (105.9 kg)   SpO2 98%   BMI 40.06 kg/m²     Physical Exam:    General:  Chronically ill appearing male  HEENT: Atraumatic, normocephalic. Moist oral mucous membranes. Neck: Supple without adenopathy, JVD, thyromegaly or masses. Trachea midline. CV: Heart RRR, no murmurs, rubs, gallops. Resp: Even, easy without cough or accessory use. Lungs clear to ascultation bilaterally. Abd: Round, soft, obese, tender to palpation. No hepatosplenomegaly or mass present. Active bowel sounds heard. Mild distention noted. Ext:  Without cyanosis, clubbing, edema. Skin: Pink, warm, dry  Neuro:  Alert, oriented x 3 with no obvious deficits. Rectal: deferred    Labs:   CBC:   Lab Results   Component Value Date    WBC 8.8 01/16/2020    HGB 11.6 01/16/2020    HCT 35.2 01/16/2020    MCV 95.9 01/16/2020    PLT 98 01/16/2020     BMP:   Lab Results   Component Value Date     01/16/2020    K 5.5 01/16/2020    K 5.6 01/15/2020     01/16/2020    CO2 17 01/16/2020    PHOS 2.7 01/14/2020    BUN 33 01/16/2020    CREATININE 3.5 01/16/2020    CALCIUM 8.3 01/16/2020     PT/INR:   Lab Results   Component Value Date    INR 1.39 01/16/2020     Lipids:   Lab Results   Component Value Date    ALKPHOS 136 01/16/2020    ALT 27 01/16/2020    AST 38 01/16/2020    BILITOT 0.7 01/16/2020    BILIDIR 0.3 01/16/2020    LABALBU 2.6 01/16/2020    LIPASE 29.3 08/21/2019     Significant Diagnostic Studies:   CT abdomen/pelvis 01/16/20      Impression   1.

## 2020-01-16 NOTE — PROGRESS NOTES
decisions for her in case she cannot make her own decisions. Also encouraged pt to complete LW and DPOA. Spiritual care consulted. Spoke with daughter Manuel Sin and updated her on conversation had with pt. Manuel Sin voiced frustration with pt noncompliance and understanding of pt fragile condition. Reviewed code status options with Manuel Sin and purpose of LW and DPOA. She voiced understanding.     Plan/Follow-Up:  Follow supportively    Dian Lara RN  1/16/2020,  10:50 AM

## 2020-01-16 NOTE — FLOWSHEET NOTE
01/15/20 5022   Provider Notification   Reason for Communication New orders  (urology consult)   Provider Name Flori Mejia NP   Provider Notification Nurse Practitioner   Method of Communication Secure Message   Notification Time [id number]: Perfect serve message sent to Flori Mejia NP regarding new consult for mederos catheter that patient was discharged with from a previous admission. [id number] Verduzco NP responded to perfect serve message and stated ok. 0940: Flori Mejia NP called this RN in regards to consult. He asked why we were consulting for a urinary catheter. Wanted this RN to do a void trial and see if patient still had retention and if so then to reinsert and reconsult for retention. Dr. Monna Meigs on unit.  This RN updated him and he wanted to make sure nephrology was ok with pulling mederos catheter since creatinine was elevated at 3.3 this am.

## 2020-01-16 NOTE — CARE COORDINATION
DISCHARGE ON GOING EVALUATION    [facility] day: 2  Location: [facility]15/015-A Reason for admit: Hyperglycemia [R73.9]   Procedure:   1/15 Paracentesis = 8L removed  1/16 CT Abdomen   Impression:       1. . Nonspecific abdominal gas pattern. 2. Minimal small bowel gas retention. Correlate with mild enteritis. Treatment Plan of Care: Lactulose/Rifaximin continued  Barriers to Discharge: Na+ 127 (129) worse, Creatinine 3.5 (3.3) worse; monitor. Palliative Care/Urology/Nephrology/GI following, IVF, PO Diuretics, IV Rocephin, IV Albumin x1 today continued.  Nause/Emesis last 24h; monitor  PCP: Maurilio Medina PA-C  Readmission Risk Score: 38%  Patient Goals/Plan/Treatment Preferences: lives with daughter Edenilson Cisse, weekly paracenteses PTA, has cane, walker, WC; NARCISA referral for possible HH/AL/ECF pending, texted Attending for therapy orders; await reply    Update: therapy orders received from Attending  Electronically signed by Mila Arora RN on 1/16/2020 at 1:35 PM

## 2020-01-16 NOTE — PROGRESS NOTES
Patients mederos catheter was pulled at 1430. Patient had been up to commode to attempt to urinate twice. This RN attempted a bladder scan, due to patients ascites and abdomen no clear reading was confirmed for bladder scan. This RN called EKG to come to unit and attempt with their bladder scanner. EKG stated that they were doing shift change and that it would be at least a half hour and to have night shift nurse attempt and call back if she was not able to get a reading either. Passed information along to night shift nurse, Agueda GALLEGOS.

## 2020-01-17 ENCOUNTER — APPOINTMENT (OUTPATIENT)
Dept: MRI IMAGING | Age: 60
DRG: 420 | End: 2020-01-17
Attending: INTERNAL MEDICINE
Payer: MEDICAID

## 2020-01-17 LAB
ALBUMIN SERPL-MCNC: 3.6 G/DL (ref 3.5–5.1)
ALP BLD-CCNC: 113 U/L (ref 38–126)
ALT SERPL-CCNC: 26 U/L (ref 11–66)
AMMONIA: 93 UMOL/L (ref 11–60)
ANION GAP SERPL CALCULATED.3IONS-SCNC: 14 MEQ/L (ref 8–16)
AST SERPL-CCNC: 36 U/L (ref 5–40)
BASOPHILS # BLD: 0.7 %
BASOPHILS ABSOLUTE: 0 THOU/MM3 (ref 0–0.1)
BILIRUB SERPL-MCNC: 0.9 MG/DL (ref 0.3–1.2)
BILIRUBIN DIRECT: 0.3 MG/DL (ref 0–0.3)
BUN BLDV-MCNC: 36 MG/DL (ref 7–22)
CALCIUM SERPL-MCNC: 8.6 MG/DL (ref 8.5–10.5)
CHLORIDE BLD-SCNC: 103 MEQ/L (ref 98–111)
CO2: 14 MEQ/L (ref 23–33)
CREAT SERPL-MCNC: 3.7 MG/DL (ref 0.4–1.2)
EOSINOPHIL # BLD: 0 %
EOSINOPHILS ABSOLUTE: 0 THOU/MM3 (ref 0–0.4)
ERYTHROCYTE [DISTWIDTH] IN BLOOD BY AUTOMATED COUNT: 14.5 % (ref 11.5–14.5)
ERYTHROCYTE [DISTWIDTH] IN BLOOD BY AUTOMATED COUNT: 52.5 FL (ref 35–45)
GFR SERPL CREATININE-BSD FRML MDRD: 12 ML/MIN/1.73M2
GLUCOSE BLD-MCNC: 116 MG/DL (ref 70–108)
GLUCOSE BLD-MCNC: 139 MG/DL (ref 70–108)
GLUCOSE BLD-MCNC: 59 MG/DL (ref 70–108)
GLUCOSE BLD-MCNC: 86 MG/DL (ref 70–108)
GLUCOSE BLD-MCNC: 93 MG/DL (ref 70–108)
GLUCOSE BLD-MCNC: 99 MG/DL (ref 70–108)
HCT VFR BLD CALC: 33 % (ref 37–47)
HEMOGLOBIN: 10.7 GM/DL (ref 12–16)
IMMATURE GRANS (ABS): 0.06 THOU/MM3 (ref 0–0.07)
IMMATURE GRANULOCYTES: 1.1 %
LV EF: 65 %
LVEF MODALITY: NORMAL
LYMPHOCYTES # BLD: 12.5 %
LYMPHOCYTES ABSOLUTE: 0.7 THOU/MM3 (ref 1–4.8)
MAGNESIUM: 2.2 MG/DL (ref 1.6–2.4)
MCH RBC QN AUTO: 32 PG (ref 26–33)
MCHC RBC AUTO-ENTMCNC: 32.4 GM/DL (ref 32.2–35.5)
MCV RBC AUTO: 98.8 FL (ref 81–99)
MONOCYTES # BLD: 8.8 %
MONOCYTES ABSOLUTE: 0.5 THOU/MM3 (ref 0.4–1.3)
NUCLEATED RED BLOOD CELLS: 0 /100 WBC
PLATELET # BLD: 69 THOU/MM3 (ref 130–400)
PMV BLD AUTO: 9.6 FL (ref 9.4–12.4)
POTASSIUM SERPL-SCNC: 4.9 MEQ/L (ref 3.5–5.2)
RBC # BLD: 3.34 MILL/MM3 (ref 4.2–5.4)
SEG NEUTROPHILS: 76.9 %
SEGMENTED NEUTROPHILS ABSOLUTE COUNT: 4.2 THOU/MM3 (ref 1.8–7.7)
SODIUM BLD-SCNC: 131 MEQ/L (ref 135–145)
T4 FREE: 0.77 NG/DL (ref 0.93–1.76)
TOTAL PROTEIN: 6.2 G/DL (ref 6.1–8)
TSH SERPL DL<=0.05 MIU/L-ACNC: 65.05 UIU/ML (ref 0.4–4.2)
WBC # BLD: 5.5 THOU/MM3 (ref 4.8–10.8)

## 2020-01-17 PROCEDURE — 6370000000 HC RX 637 (ALT 250 FOR IP): Performed by: INTERNAL MEDICINE

## 2020-01-17 PROCEDURE — 6370000000 HC RX 637 (ALT 250 FOR IP): Performed by: PHYSICIAN ASSISTANT

## 2020-01-17 PROCEDURE — 99233 SBSQ HOSP IP/OBS HIGH 50: CPT | Performed by: INTERNAL MEDICINE

## 2020-01-17 PROCEDURE — 80053 COMPREHEN METABOLIC PANEL: CPT

## 2020-01-17 PROCEDURE — 93306 TTE W/DOPPLER COMPLETE: CPT

## 2020-01-17 PROCEDURE — 82948 REAGENT STRIP/BLOOD GLUCOSE: CPT

## 2020-01-17 PROCEDURE — 6360000002 HC RX W HCPCS: Performed by: INTERNAL MEDICINE

## 2020-01-17 PROCEDURE — 99222 1ST HOSP IP/OBS MODERATE 55: CPT | Performed by: PSYCHIATRY & NEUROLOGY

## 2020-01-17 PROCEDURE — 85025 COMPLETE CBC W/AUTO DIFF WBC: CPT

## 2020-01-17 PROCEDURE — 2580000003 HC RX 258: Performed by: INTERNAL MEDICINE

## 2020-01-17 PROCEDURE — 99252 IP/OBS CONSLTJ NEW/EST SF 35: CPT | Performed by: PSYCHIATRY & NEUROLOGY

## 2020-01-17 PROCEDURE — 70544 MR ANGIOGRAPHY HEAD W/O DYE: CPT

## 2020-01-17 PROCEDURE — 83735 ASSAY OF MAGNESIUM: CPT

## 2020-01-17 PROCEDURE — 82248 BILIRUBIN DIRECT: CPT

## 2020-01-17 PROCEDURE — 84439 ASSAY OF FREE THYROXINE: CPT

## 2020-01-17 PROCEDURE — 70551 MRI BRAIN STEM W/O DYE: CPT

## 2020-01-17 PROCEDURE — 70547 MR ANGIOGRAPHY NECK W/O DYE: CPT

## 2020-01-17 PROCEDURE — 2580000003 HC RX 258: Performed by: PHYSICIAN ASSISTANT

## 2020-01-17 PROCEDURE — P9047 ALBUMIN (HUMAN), 25%, 50ML: HCPCS | Performed by: INTERNAL MEDICINE

## 2020-01-17 PROCEDURE — 2060000000 HC ICU INTERMEDIATE R&B

## 2020-01-17 PROCEDURE — 2709999900 HC NON-CHARGEABLE SUPPLY

## 2020-01-17 PROCEDURE — 84443 ASSAY THYROID STIM HORMONE: CPT

## 2020-01-17 PROCEDURE — 36415 COLL VENOUS BLD VENIPUNCTURE: CPT

## 2020-01-17 RX ORDER — MORPHINE SULFATE 2 MG/ML
2 INJECTION, SOLUTION INTRAMUSCULAR; INTRAVENOUS ONCE
Status: COMPLETED | OUTPATIENT
Start: 2020-01-17 | End: 2020-01-17

## 2020-01-17 RX ORDER — LACTULOSE 10 G/15ML
20 SOLUTION ORAL 3 TIMES DAILY
Status: DISCONTINUED | OUTPATIENT
Start: 2020-01-17 | End: 2020-01-22 | Stop reason: HOSPADM

## 2020-01-17 RX ORDER — ASPIRIN 81 MG/1
81 TABLET, CHEWABLE ORAL DAILY
Status: DISCONTINUED | OUTPATIENT
Start: 2020-01-17 | End: 2020-01-22 | Stop reason: HOSPADM

## 2020-01-17 RX ORDER — 0.9 % SODIUM CHLORIDE 0.9 %
500 INTRAVENOUS SOLUTION INTRAVENOUS ONCE
Status: COMPLETED | OUTPATIENT
Start: 2020-01-17 | End: 2020-01-17

## 2020-01-17 RX ORDER — ATORVASTATIN CALCIUM 40 MG/1
40 TABLET, FILM COATED ORAL NIGHTLY
Status: DISCONTINUED | OUTPATIENT
Start: 2020-01-17 | End: 2020-01-22 | Stop reason: HOSPADM

## 2020-01-17 RX ORDER — INSULIN GLARGINE 100 [IU]/ML
15 INJECTION, SOLUTION SUBCUTANEOUS 2 TIMES DAILY
Status: DISCONTINUED | OUTPATIENT
Start: 2020-01-17 | End: 2020-01-18

## 2020-01-17 RX ADMIN — CEFTRIAXONE SODIUM 1 G: 1 INJECTION, POWDER, FOR SOLUTION INTRAMUSCULAR; INTRAVENOUS at 15:15

## 2020-01-17 RX ADMIN — ALBUMIN (HUMAN) 25 G: 0.25 INJECTION, SOLUTION INTRAVENOUS at 01:48

## 2020-01-17 RX ADMIN — ALBUMIN (HUMAN) 25 G: 0.25 INJECTION, SOLUTION INTRAVENOUS at 17:44

## 2020-01-17 RX ADMIN — SODIUM CHLORIDE 500 ML: 9 INJECTION, SOLUTION INTRAVENOUS at 03:54

## 2020-01-17 RX ADMIN — ALBUMIN (HUMAN) 25 G: 0.25 INJECTION, SOLUTION INTRAVENOUS at 12:03

## 2020-01-17 RX ADMIN — SODIUM CHLORIDE: 9 INJECTION, SOLUTION INTRAVENOUS at 04:57

## 2020-01-17 RX ADMIN — ASPIRIN 81 MG 81 MG: 81 TABLET ORAL at 10:53

## 2020-01-17 RX ADMIN — SERTRALINE 50 MG: 50 TABLET, FILM COATED ORAL at 10:51

## 2020-01-17 RX ADMIN — MIDODRINE HYDROCHLORIDE 15 MG: 10 TABLET ORAL at 17:43

## 2020-01-17 RX ADMIN — MORPHINE SULFATE 2 MG: 2 INJECTION, SOLUTION INTRAMUSCULAR; INTRAVENOUS at 18:19

## 2020-01-17 RX ADMIN — DEXTROSE MONOHYDRATE 12.5 G: 500 INJECTION PARENTERAL at 11:51

## 2020-01-17 RX ADMIN — OCTREOTIDE ACETATE 100 MCG: 100 INJECTION, SOLUTION INTRAVENOUS; SUBCUTANEOUS at 03:57

## 2020-01-17 RX ADMIN — INSULIN GLARGINE 15 UNITS: 100 INJECTION, SOLUTION SUBCUTANEOUS at 22:11

## 2020-01-17 RX ADMIN — MIDODRINE HYDROCHLORIDE 15 MG: 10 TABLET ORAL at 10:51

## 2020-01-17 ASSESSMENT — PAIN SCALES - GENERAL
PAINLEVEL_OUTOF10: 5
PAINLEVEL_OUTOF10: 0

## 2020-01-17 NOTE — VIRTUAL HEALTH
mouth daily   Yes Historical Provider, MD   sertraline (ZOLOFT) 50 MG tablet Take 50 mg by mouth daily   Yes Historical Provider, MD   insulin lispro (HUMALOG KWIKPEN) 100 UNIT/ML pen Inject 10 Units into the skin 3 times daily (before meals) 10/6/19  Yes HONORIO Corral   levothyroxine (SYNTHROID) 150 MCG tablet Take 300 mcg by mouth Daily 45 minutes before breakfast    Yes Historical Provider, MD   Insulin Pen Needle 29G X 12MM MISC 1 each by Does not apply route daily 9/20/19  Yes Mallory Aguilar MD   Lancets MISC To check blood glucose three times daily before meals and as needed PRN 9/20/19  Yes Mallory Aguilar MD   ondansetron (ZOFRAN ODT) 4 MG disintegrating tablet Take 1 tablet by mouth every 8 hours as needed for Nausea or Vomiting 8/29/19  Yes Yoly Mancia MD    Scheduled Meds:   albumin human  25 g Intravenous Q6H    octreotide  100 mcg Subcutaneous Q6H    sucralfate  1 g Oral 4x Daily AC & HS    insulin glargine  20 Units Subcutaneous BID    cefTRIAXone (ROCEPHIN) IV  1 g Intravenous Q24H    [Held by provider] bumetanide  1 mg Oral Daily    lactulose  20 g Oral Daily    levothyroxine  300 mcg Oral Daily    midodrine  15 mg Oral TID WC    rifaximin  550 mg Oral BID    sertraline  50 mg Oral Daily    [Held by provider] spironolactone  50 mg Oral Daily    phosphorus replacement protocol   Other RX Placeholder    sodium chloride flush  10 mL Intravenous 2 times per day    insulin lispro  0-12 Units Subcutaneous TID WC    insulin lispro  0-6 Units Subcutaneous Nightly     Continuous Infusions:   dextrose      sodium chloride 75 mL/hr at 01/16/20 0051     PRN Meds:.traMADol, sodium chloride flush, ondansetron, potassium chloride **OR** potassium alternative oral replacement **OR** potassium chloride, polyethylene glycol, insulin regular, dextrose, glucose, glucagon (rDNA), dextrose  Past Medical History   has a past medical history of Anxiety disorder, Arthritis, Chronic kidney Mother     Diabetes Father     Hypertension Father     Cancer Sister     Hypertension Sister     Hypertension Brother        OBJECTIVE  /60   Pulse 62   Temp 96.2 °F (35.7 °C) (Oral)   Resp 20   Ht 5' 4\" (1.626 m)   Wt 233 lb 6.4 oz (105.9 kg)   SpO2 98%   BMI 40.06 kg/m²     NIH Stroke Scale  Interval: Baseline  Level of Consciousness (1a. ): Not alert, but arousable by minor stimulation to obey  LOC Questions (1b. ): Answers neither question correctly  LOC Commands (1c. ): Performs both tasks correctly  Best Gaze (2. ): Normal  Visual (3. ): No visual loss  Facial Palsy (4. ): (!) Minor paralysis  Motor Arm, Left (5a. ): No drift  Motor Arm, Right (5b. ): No drift  Motor Leg, Left (6a. ): No drift  Motor Leg, Right (6b. ): Drift, but does not hit bed  Limb Ataxia (7. ): Absent  Sensory (8. ): Normal  Best Language (9. ): Severe aphasia  Dysarthria (10. ): Severe dysarthria  Extinction and Inattention (11): No abnormality  Total: 9      Imaging:  Images were personally reviewed including:  As per HPI    Assessment    Differential DDx:  1. Stroke on the top of hepatic encephalopathy. Recommendations:  Recommend Inpatient Neurology Consult for further assessment and evaluation   ASA 81 mg daily  Lipitor 80 mg daily  Folic acid 1 mg BID  Stroke labs : A1C and LDL  TTE   NS bolus 500 then 100 cc/hr  Keep BP >130 SBP        Discussed with nurse at bedside      This is a Phone Consult, I have not seen the patient face to face, the telemedicine device was not utilized.     Kristen Cuadra MD   Stroke, Neurocritical Care And/or [address] [place] [address] Stroke [address] [facility]   Electronically signed 1/17/2020 at 2:37 AM

## 2020-01-17 NOTE — DISCHARGE INSTR - COC
Continuity of Care Form    Patient Name: Lennox Cruz   :  1960  MRN:  [id number]    Admit date:  2020  Discharge date:  2020    Code Status Order: [facility]   Advance Directives:   [facility] Documentation     Date/Time Healthcare Directive Type of Healthcare Directive Copy in [facility] [address] Agent's Name Healthcare Agent's Phone Number    [phone]  No, patient does not have an advance directive for healthcare treatment -- -- -- -- --          Admitting Physician:  Carlota Sebastian DO  PCP: Burgess Thania PA-C    Discharging Nurse: [facility] Unit/Room#: 4K-15/015-A  Discharging Unit Phone Number: [phone]    Emergency Contact:   Extended Emergency Contact Information  Primary Emergency Contact: [address] [employer] [address], [address]  Phone: [phone]  Mobile Phone: [phone]  Relation: Child  Preferred language: English   needed? No    Past Surgical History:  Past Surgical History:   Procedure Laterality Date    BACK SURGERY      states that she has cervical disc disease and not the lumbar    CERVICAL DISC SURGERY  2016     SECTION      ENDOSCOPY, COLON, DIAGNOSTIC      EYE SURGERY      cataracts    FINGER AMPUTATION Left     HEEL SPUR SURGERY Right     HYSTERECTOMY      PARACENTESIS      UPPER GASTROINTESTINAL ENDOSCOPY Left 2019    EGD BIOPSY performed by Bruna Jeans, MD at [facility]       Immunization History: There is no immunization history for the selected administration types on file for this patient.     Active Problems:  Patient Active Problem List   Diagnosis Code    Hypothyroid A66.6    Alcoholic cirrhosis of liver with ascites ([facility] K70.31    COPD (chronic obstructive pulmonary disease) (HCC) J44.9    Hyperlipidemia E78.5    Chronic kidney disease (CKD), stage III (moderate) (HCC) N18.3    Current smoker F17.200    Type 2 diabetes mellitus with hyperglycemia,

## 2020-01-17 NOTE — PROGRESS NOTES
01/17/20  0534   NA  --    < > 131*   K  --    < > 4.9   CL  --    < > 103   CO2  --    < > 14*   BUN  --    < > 36*   CREATININE  --    < > 3.7*   CALCIUM  --    < > 8.6   PHOS 2.7  --   --     < > = values in this interval not displayed. Recent Labs     01/17/20  0534   AST 36   ALT 26   BILIDIR 0.3   BILITOT 0.9   ALKPHOS 113     Recent Labs     01/16/20  0542   INR 1.39*     No results for input(s): CKTOTAL, TROPONINI in the last 72 hours. Urinalysis:      Lab Results   Component Value Date    NITRU NEGATIVE 01/15/2020    WBCUA > 200 01/15/2020    BACTERIA MANY 01/15/2020    RBCUA 25-50 01/15/2020    BLOODU LARGE 01/15/2020    SPECGRAV 1.015 01/15/2020    GLUCOSEU >= 1000 11/21/2019       Radiology:   All imaging reviewed     Diet: DIET LOW SODIUM 2 GM; 1500 ml      Code Status: DNR-CC            Electronically signed by Nori Tse MD on 1/17/2020 at 5:43 PM

## 2020-01-17 NOTE — CARE COORDINATION
DISCHARGE ON GOING EVALUATION    [facility] day: 3  Location: -15/015-A Reason for admit: Hyperglycemia [R73.9]   Procedure:   1/15 Paracentesis = 8L removed  1/16 CT Abdomen   Impression:       1. . Nonspecific abdominal gas pattern. 2. Minimal small bowel gas retention. Correlate with mild enteritis. 1/17 MRA Neck  Narrowing of the internal carotid artery takeoffs bilaterally with approximately 60% stenosis on the right and 44% stenosis on the left by NASCET criteria. 1/17 MRI Brain   1. Small acute infarcts in the left middle cerebral artery territory involving the left insula, left corona radiata and left frontal operculum. 2. Old lacunar infarcts in the bilateral cerebellar hemispheres. 3. Mild chronic microvascular angiopathy. 1/17 ECHO planned    Treatment Plan of Care: Neurology following (code CVA last 24h; monitor). DNR Comfort Care status. Palliative Care/Urology/Nephrology/GI following. IVF, Albumin, AB continued  Barriers to Discharge: Creatinine 3.7 (3.5) worse; monitor. PCP: Gerald St PA-C  Readmission Risk Score: 36%  Patient Goals/Plan/Treatment Preferences: lives with daughter Nikole Abraham, weekly paracenteses PTA, has cane, walker, WC; SW following for possible new [address] [facility]; therapies following (precert), LORA Duran Admissions report [address] is contracted with this facility;  Renee reports they can accept patient with Hospice if this is planned; collaborated with Keara Damico

## 2020-01-17 NOTE — CONSULTS
NEUROLOGY CONSULT NOTE      Requesting Physician: Joseph Pollard MD    Reason for Consult:  Evaluate for patient difficulty around 11:40 PM.  Last known normal was 3:45 PM yesterday    History of Present Illness:  Nirmala Valentine is a 62 y.o. female admitted to [address] [place] [address] on 2020. Patient is a known case of COPD diabetes, anxiety disorder EtOH abuse now admitted with hyperglycemia, alcoholic cirrhosis with ascites chronic kidney disease and type 2 diabetes. History code alert was called at 11:30 PM for speech difficulty. Her NIH stroke scale was 9. Patient was outside therapeutic window for TPA. MRI confirmed left temporal lobe acute infarct. Reports no chest pain. No shortness of breath with exertion. Reports no neck pain. No vision changes. No dysphagia. No fever. No rash. No weight loss. Past Medical History:        Diagnosis Date    Anxiety disorder     Arthritis     Chronic kidney disease     COPD [facility]chronic obstructive pulmonary disease) ([facility] [address]     Diabetes mellitus ([facility] [address]     GERD (gastroesophageal reflux disease)     H/O ETOH abuse     Heart abnormality     Hyperlipidemia     Liver disease     alcoholic liver disease, cirrhosis    Macrocytic anemia 10/4/2019    Thyroid disease            Procedure Laterality Date    BACK SURGERY      states that she has cervical disc disease and not the lumbar    CERVICAL DISC SURGERY  2016     SECTION      ENDOSCOPY, COLON, DIAGNOSTIC      EYE SURGERY      cataracts    FINGER AMPUTATION Left     HEEL SPUR SURGERY Right     HYSTERECTOMY      PARACENTESIS      UPPER GASTROINTESTINAL ENDOSCOPY Left 2019    EGD BIOPSY performed by Maddison Joyner MD at [facility]       Allergies:     Allergies   Allergen Reactions    Gabapentin Other (See Comments)     Tried to commit suicide  Other reaction(s): suicidal ideation  Other reaction(s): suicidal ideation      Hydrocodone-Acetaminophen     Naproxen CMP:  Recent Labs     01/15/20  0541 01/16/20  0542 01/17/20  0534   * 127* 131*   K 5.6* 5.5* 4.9    101 103   CO2 17* 17* 14*   BUN 30* 33* 36*   CREATININE 3.3* 3.5* 3.7*   LABGLOM 14* 13* 12*   GLUCOSE 303* 212* 99   CALCIUM 8.7 8.3* 8.6     Liver:   Recent Labs     01/17/20  0534   AST 36   ALT 26   ALKPHOS 113   PROT 6.2   LABALBU 3.6   BILITOT 0.9     Stroke Labs: No results for input(s): [id number], TSH, LDLCALC, LABA1C in the last 72 hours. Imaging:  No results found for this or any previous visit. Results for orders placed during the hospital encounter of 01/14/20   MRA HEAD WO CONTRAST    Narrative PROCEDURE:  MRA HEAD WO CONTRAST      TECHNIQUE:  Axial 3-D time-of-flight MR angiography of the [place] and brain was performed. The source images were reconstructed in various views using maximum intensity projection. HISTORY: aphasia      COMPARISONS:  MR brain 1/16/2020 . FINDINGS:      Vertebral arteries:  Normal .   Basilar artery:  Normal .   Internal carotid arteries:  Normal .   Anterior cerebral arteries:  Normal .   Middle cerebral arteries:  Peripherally the arteries are attenuated bilaterally. This may be artifactual. . Posterior cerebral arteries:  Normal .   Branch occlusions:  None . Vascular malformations:  None . Peripheral attenuation of the M3 arteries bilaterally. Whether this is artifactual is uncertain. No additional vascular abnormalities identified in the anterior and posterior circulation. This document is electronically signed by Sheliah Boeck, MD., January 17 2020 01:56:27 AM ET     No results found for this or any previous visit. No results found for this or any previous visit.   Results for orders placed during the hospital encounter of 01/14/20   MRI BRAIN WO CONTRAST    Narrative PROCEDURE: MRI BRAIN WO CONTRAST      TECHNIQUE:  Routine fast spin-echo sequences were obtained the brain without IV contrast.      HISTORY: aphasia      COMPARISONS: None       FINDINGS:      There are foci of restricted diffusion within the cortex of the left temporal lobe and in the adjacent white matter of the left frontal lobe compatible with acute ischemia. There is no evidence of hemorrhage. The ventricular system is appropriate in size   and is symmetric. There is mildly increased signal of the periventricular white matter bilaterally compatible with chronic ischemic white matter disease changes. In the posterior fossa there are several remote lacunar infarcts. There is normal signal   void in the carotid and basilar arteries. The sinuses reveal a polyp in the right sphenoid sinus. The orbital structures appear normal. The sella and corpus callosum appear normal. There is considerable artifact along the skull base which is of uncertain   etiology. Acute nonhemorrhagic infarct in the left temporal lobe and adjacent white matter of the left frontal lobe without mass effect. Increased signal of the periventricular white matter compatible with chronic ischemic white matter disease changes. Remote lacunar infarcts in both cerebellar hemispheres. .   The acute ischemic findings in left frontal and left temporal lobes were discussed with nurse Violeta Wang at 1:55 AM on 1/17/2020. This document is electronically signed by Luc Schroeder MD., January 17 2020 01:55:34 AM ET     No results found for this or any previous visit. No results found for this or any previous visit. Results for orders placed during the hospital encounter of 01/14/20   CT HEAD WO CONTRAST (CODE STROKE)    Narrative PROCEDURE: CT HEAD WO CONTRAST    CLINICAL INFORMATION: aphasia. COMPARISON: No prior study. TECHNIQUE: Noncontrast 5 mm axial images were obtained through the brain.     All CT scans at this facility use dose modulation, iterative reconstruction, and/or weight-based dosing when appropriate to reduce radiation dose to as low as reasonably achievable. FINDINGS:    There are no acute fractures. Exam is negative for hemorrhage, midline shift, edema, hydrocephalus, or obvious acute large vessel infarct. There are patchy periventricular areas of hypodensity compatible with chronic small vessel disease. If suspicion is   high for acute on chronic ischemic event follow-up MRI with diffusion is recommended if is not contraindicated. The paranasal sinuses are visualized with mild mucous retention of the right sphenoid air space. Impression . 1. Atrophy related changes with chronic small vessel disease. If acute ischemic event is highly suspected her clinical symptoms worsen follow-up MRI with diffusion should be considered as discussed above. **This report has been created using voice recognition software. It may contain minor errors which are inherent in voice recognition technology. **    Final report electronically signed by Dr. Lisa Escalera on 1/17/2020 12:03 AM       We reviewed the patient records and available information in the EHR     Principal Problem:    Alcoholic cirrhosis of liver with ascites ([address]  Active Problems: Morbid obesity ([address]    Generalized abdominal pain    Coagulopathy (HCC)    Decompensation of cirrhosis of liver (HCC)    Thrombocytopenia (HCC)    Hyperglycemia  Resolved Problems:    * No resolved hospital problems. *      ASSESSMENT/PLAN: This is a 62 y.o. female who  has a past medical history of Anxiety disorder, Arthritis, Chronic kidney disease, COPD (chronic obstructive pulmonary disease) ([address] Diabetes mellitus ([address] GERD (gastroesophageal reflux disease), H/O ETOH abuse, Heart abnormality, Hyperlipidemia, Liver disease, Macrocytic anemia, and Thyroid disease. She presents with speech difficulty. MRI confirmatory of left MCA ischemic stroke.     1.  Acute left MCA ischemic stroke/severe thrombocytopenia: She was outside TPA window, and also not a thrombectomy candidate as there was no large vessel occlusion. Given her low platelets we cannot be aggressive on her antiplatelet therapy. Extremely guarded prognosis given her mental status, neurological deficits and comorbidities. - Stroke labs; B12 NORMAL, TSH>20, LD 98 and A1c 16.  - urinalysis. - MRA HEAD AND NECK Narrowing of the internal carotid artery takeoffs bilaterally with approximately 60% stenosis on the right and 44% stenosis on the left by NASCET criteria. .  - MRI brain ACUTE LEFT MCA ISCHEMIC stroke. - Telemetry.   - PT/OT speech rehabilitation assessment.  - DVT prophylaxis. - Swallow study. - At discharge Event monitor x 30 days to rule out afib or other arrhythmias. - Echocardiogram WITH BUBBLE STUDY TO RULE OUT LA/LV THROMBUS/PFO. - Antiplatelet therapy (aspirin 81 mg q.day) and high intensity statins (atorvastatin 40 mg q.day). Secondary stroke prophylaxis: At discharge  long-term goals for stroke prophylaxis is:   -Blood pressure should be less than 130/80,  - HbA1c less than 6.5,   - LDL less than 70;  - b12>500 and   - smoking cessation if applicable. We would be grateful if the primary team / primary care physician keep a close watch on this above targets. 2.  Severe hypothyroidism:  -Please correct the thyroid status    Extremely guarded prognosis agree with patient's family plan about hospice. Thank you for allowing us to participate in the care of this patient.       Electronically signed by Areli Arredondo MD on 1/17/2020 at 9:11 AM

## 2020-01-17 NOTE — PROGRESS NOTES
[facility]    Renal inpatient Progress Note  1/17/2020 9:52 AM      Pt Name:   Cherry Cedeño  YOB: 1960  Attending:   Jeanmarie Loyd MD    Chief Complaint:   Cherry Cedeño is a 62 y.o. female being followed by nephrology for RAMBO     Interval History : patient seen and examined by me. Had a stroke last night and she is not communicative . BP still running low . Scheduled Medications :   aspirin  81 mg Oral Daily    atorvastatin  40 mg Oral Nightly    lactulose  20 g Oral TID    albumin human  25 g Intravenous Q6H    octreotide  100 mcg Subcutaneous Q6H    sucralfate  1 g Oral 4x Daily AC & HS    insulin glargine  20 Units Subcutaneous BID    cefTRIAXone (ROCEPHIN) IV  1 g Intravenous Q24H    [Held by provider] bumetanide  1 mg Oral Daily    levothyroxine  300 mcg Oral Daily    midodrine  15 mg Oral TID WC    rifaximin  550 mg Oral BID    sertraline  50 mg Oral Daily    [Held by provider] spironolactone  50 mg Oral Daily    phosphorus replacement protocol   Other RX Placeholder    sodium chloride flush  10 mL Intravenous 2 times per day    insulin lispro  0-12 Units Subcutaneous TID WC    insulin lispro  0-6 Units Subcutaneous Nightly      dextrose      sodium chloride 75 mL/hr at 01/17/20 0457        Vitals :  BP (!) 88/52   Pulse 68   Temp 98.2 °F (36.8 °C) (Oral)   Resp 16   Ht 5' 4\" (1.626 m)   Wt 228 lb 3.2 oz (103.5 kg)   SpO2 98%   BMI 39.17 kg/m²     24HR INTAKE/OUTPUT:      Intake/Output Summary (Last 24 hours) at 1/17/2020 0952  Last data filed at 1/17/2020 0322  Gross per 24 hour   Intake 1081.61 ml   Output 700 ml   Net 381.61 ml     Last 3 weights  Wt Readings from Last 3 Encounters:   01/17/20 228 lb 3.2 oz (103.5 kg)   12/14/19 221 lb 1.6 oz (100.3 kg)   11/21/19 233 lb (105.7 kg)        Physical Exam :  General -- well developed and ill nourished  HEENT-normal external appearance of both ears and nose.   Mouth/throat  -

## 2020-01-17 NOTE — PROGRESS NOTES
2332: Arrived to [phone] for code stroke. Teofilo Rodma present. NIH completed prior to my arrival by rapid/resource RN's. Blood sugar 162, LKW 1545, NIH 9. [address] arrived with stroke bot. Access center called, call back number given for primary RN. 2346: PT taken down to CT scan with Nay Roth and Carlota Gardner RN.

## 2020-01-17 NOTE — FLOWSHEET NOTE
[facility]  PHYSICAL THERAPY MISSED TREATMENT NOTE  ACUTE CARE  STRZ ICU STEPDOWN TELEMETRY 4K              Missed Treatment  Pt was a code stroke this am.Pt is a DNR CC now per [employer]. Will discontinue orders .

## 2020-01-17 NOTE — PROGRESS NOTES
Chart reviewed briefly d/t late arrival for family referral. Met bedside with daughter JASON and her  and a cousin George Rogers and her  for presentation of hospice concepts, philosophies, services. Pt supine in bed alert but unable to verbalize any response to simple questions but able to respond to simple commands. Family recounts pt has had liver disease since she lived in [place]. JASON states her mother lived with her until this admission and because of her own disabled son's she feels her needs will be better met in a nursing home. Family informed the nursing home room and board are not covered by hospice. [facility] but they asked if hospice can continue to drain her abdominal fluid when there. The patient currently has an ostomy bag mid abdomen over her previous paracentesis site which is leaking clear yellow fluid. This nurse advised the family that a catheter could be placed in Radiology enabling the nursing home/ hospice to drain her fluid for comfort. All questions were answered to the verbalized understanding of the family members. Emotional support provided, family declined hospital  visit at this time. Primary [facility] 72. and Dr. Gloria Juan notified of family request for paracentesis catheter prior to discharge with hospice. Family advised hospice will continue to follow and assist with discharge plan.

## 2020-01-17 NOTE — PROGRESS NOTES
Patient was a Code Stroke at [phone]. Patient not speaking since shift change, last known well around documented 6475. Initially NIHSS 9. Patient can not have contrast due to poor creatinine. Stroke neurologist, Dr. Astrid Morin, recommends stat MRI head and MRA head and neck all without contrast.  Dr. Summers Hence approved MRI tech to come in for the studies. MRI shows infarct in the left temporal.  Patient started on ASA, lipitor, neurology consult ordered, goal blood pressure is above 120, fluid bolus given.     Herman Pandya PA-C

## 2020-01-17 NOTE — CARE COORDINATION
1/17/20, 3:37 PM  Possible discharge this weekend to [facility], [facility] payment (no pre-cert if going with hospice). NARCISA provided the nurse with the phone number for report--[phone] and fax number --[phone] to complete the discharge. Patient goals/plan/ treatment preferences discussed by  and . Patient goals/plan/ treatment preferences reviewed with patient/ family. Patient/ family verbalize understanding of discharge plan and are in agreement with goal/plan/treatment preferences. Understanding was demonstrated using the teach back method. AVS provided by RN at time of discharge, which includes all necessary medical information pertaining to the patients current course of illness, treatment, post-discharge goals of care, and treatment preferences.     Services After Discharge  Services At/After Discharge: Nursing Services, In ambulance, Hospice([facility])

## 2020-01-18 LAB
ANION GAP SERPL CALCULATED.3IONS-SCNC: 14 MEQ/L (ref 8–16)
BUN BLDV-MCNC: 35 MG/DL (ref 7–22)
CALCIUM SERPL-MCNC: 8.4 MG/DL (ref 8.5–10.5)
CHLORIDE BLD-SCNC: 105 MEQ/L (ref 98–111)
CO2: 14 MEQ/L (ref 23–33)
CREAT SERPL-MCNC: 3.9 MG/DL (ref 0.4–1.2)
GFR SERPL CREATININE-BSD FRML MDRD: 12 ML/MIN/1.73M2
GLUCOSE BLD-MCNC: 130 MG/DL (ref 70–108)
GLUCOSE BLD-MCNC: 155 MG/DL (ref 70–108)
GLUCOSE BLD-MCNC: 208 MG/DL (ref 70–108)
GLUCOSE BLD-MCNC: 210 MG/DL (ref 70–108)
GLUCOSE BLD-MCNC: 51 MG/DL (ref 70–108)
GLUCOSE BLD-MCNC: 58 MG/DL (ref 70–108)
POTASSIUM SERPL-SCNC: 4.6 MEQ/L (ref 3.5–5.2)
SODIUM BLD-SCNC: 133 MEQ/L (ref 135–145)

## 2020-01-18 PROCEDURE — 6360000002 HC RX W HCPCS: Performed by: INTERNAL MEDICINE

## 2020-01-18 PROCEDURE — 2580000003 HC RX 258: Performed by: PHYSICIAN ASSISTANT

## 2020-01-18 PROCEDURE — 6370000000 HC RX 637 (ALT 250 FOR IP): Performed by: NURSE PRACTITIONER

## 2020-01-18 PROCEDURE — P9047 ALBUMIN (HUMAN), 25%, 50ML: HCPCS | Performed by: INTERNAL MEDICINE

## 2020-01-18 PROCEDURE — 6370000000 HC RX 637 (ALT 250 FOR IP): Performed by: INTERNAL MEDICINE

## 2020-01-18 PROCEDURE — 99233 SBSQ HOSP IP/OBS HIGH 50: CPT | Performed by: INTERNAL MEDICINE

## 2020-01-18 PROCEDURE — 2580000003 HC RX 258: Performed by: INTERNAL MEDICINE

## 2020-01-18 PROCEDURE — 6370000000 HC RX 637 (ALT 250 FOR IP): Performed by: PHYSICIAN ASSISTANT

## 2020-01-18 PROCEDURE — 82948 REAGENT STRIP/BLOOD GLUCOSE: CPT

## 2020-01-18 PROCEDURE — 1200000000 HC SEMI PRIVATE

## 2020-01-18 PROCEDURE — 2709999900 HC NON-CHARGEABLE SUPPLY

## 2020-01-18 PROCEDURE — 36415 COLL VENOUS BLD VENIPUNCTURE: CPT

## 2020-01-18 PROCEDURE — 99232 SBSQ HOSP IP/OBS MODERATE 35: CPT | Performed by: INTERNAL MEDICINE

## 2020-01-18 PROCEDURE — 80048 BASIC METABOLIC PNL TOTAL CA: CPT

## 2020-01-18 RX ORDER — DEXTROSE MONOHYDRATE 25 G/50ML
25 INJECTION, SOLUTION INTRAVENOUS ONCE
Status: COMPLETED | OUTPATIENT
Start: 2020-01-18 | End: 2020-01-18

## 2020-01-18 RX ORDER — INSULIN GLARGINE 100 [IU]/ML
10 INJECTION, SOLUTION SUBCUTANEOUS 2 TIMES DAILY
Status: DISCONTINUED | OUTPATIENT
Start: 2020-01-18 | End: 2020-01-18

## 2020-01-18 RX ORDER — INSULIN GLARGINE 100 [IU]/ML
10 INJECTION, SOLUTION SUBCUTANEOUS NIGHTLY
Status: DISCONTINUED | OUTPATIENT
Start: 2020-01-18 | End: 2020-01-22 | Stop reason: HOSPADM

## 2020-01-18 RX ADMIN — MIDODRINE HYDROCHLORIDE 15 MG: 10 TABLET ORAL at 08:55

## 2020-01-18 RX ADMIN — ALBUMIN (HUMAN) 25 G: 0.25 INJECTION, SOLUTION INTRAVENOUS at 12:05

## 2020-01-18 RX ADMIN — MIDODRINE HYDROCHLORIDE 15 MG: 10 TABLET ORAL at 12:04

## 2020-01-18 RX ADMIN — OCTREOTIDE ACETATE 100 MCG: 100 INJECTION, SOLUTION INTRAVENOUS; SUBCUTANEOUS at 15:00

## 2020-01-18 RX ADMIN — ALBUMIN (HUMAN) 25 G: 0.25 INJECTION, SOLUTION INTRAVENOUS at 05:59

## 2020-01-18 RX ADMIN — SUCRALFATE 1 G: 1 TABLET ORAL at 11:59

## 2020-01-18 RX ADMIN — RIFAXIMIN 550 MG: 550 TABLET ORAL at 08:55

## 2020-01-18 RX ADMIN — SODIUM CHLORIDE: 9 INJECTION, SOLUTION INTRAVENOUS at 16:24

## 2020-01-18 RX ADMIN — INSULIN GLARGINE 10 UNITS: 100 INJECTION, SOLUTION SUBCUTANEOUS at 21:45

## 2020-01-18 RX ADMIN — ALBUMIN (HUMAN) 25 G: 0.25 INJECTION, SOLUTION INTRAVENOUS at 18:03

## 2020-01-18 RX ADMIN — CEFTRIAXONE SODIUM 1 G: 1 INJECTION, POWDER, FOR SOLUTION INTRAMUSCULAR; INTRAVENOUS at 14:17

## 2020-01-18 RX ADMIN — MIDODRINE HYDROCHLORIDE 15 MG: 10 TABLET ORAL at 17:35

## 2020-01-18 RX ADMIN — SERTRALINE 50 MG: 50 TABLET, FILM COATED ORAL at 08:55

## 2020-01-18 RX ADMIN — Medication 10 ML: at 08:56

## 2020-01-18 RX ADMIN — INSULIN LISPRO 4 UNITS: 100 INJECTION, SOLUTION INTRAVENOUS; SUBCUTANEOUS at 16:42

## 2020-01-18 RX ADMIN — ATORVASTATIN CALCIUM 40 MG: 40 TABLET, FILM COATED ORAL at 21:45

## 2020-01-18 RX ADMIN — INSULIN LISPRO 2 UNITS: 100 INJECTION, SOLUTION INTRAVENOUS; SUBCUTANEOUS at 21:52

## 2020-01-18 RX ADMIN — ALBUMIN (HUMAN) 25 G: 0.25 INJECTION, SOLUTION INTRAVENOUS at 01:03

## 2020-01-18 RX ADMIN — SUCRALFATE 1 G: 1 TABLET ORAL at 21:45

## 2020-01-18 RX ADMIN — SUCRALFATE 1 G: 1 TABLET ORAL at 16:30

## 2020-01-18 RX ADMIN — DEXTROSE MONOHYDRATE 25 G: 500 INJECTION PARENTERAL at 07:50

## 2020-01-18 RX ADMIN — OCTREOTIDE ACETATE 100 MCG: 100 INJECTION, SOLUTION INTRAVENOUS; SUBCUTANEOUS at 21:45

## 2020-01-18 RX ADMIN — ASPIRIN 81 MG 81 MG: 81 TABLET ORAL at 08:55

## 2020-01-18 RX ADMIN — RIFAXIMIN 550 MG: 550 TABLET ORAL at 21:45

## 2020-01-18 ASSESSMENT — PAIN SCALES - WONG BAKER
WONGBAKER_NUMERICALRESPONSE: 0

## 2020-01-18 ASSESSMENT — PAIN SCALES - GENERAL
PAINLEVEL_OUTOF10: 0
PAINLEVEL_OUTOF10: 0

## 2020-01-18 NOTE — PROGRESS NOTES
Renal Progress Note  [facility]    Patient :  Karri Cruz; 62 y.o. MRN# [id number]  Location:  [facility]/015-A  Attending:  Babak Gonzalez MD  Admit Date:  1/14/2020   Hospital Day: 4      Subjective:     Nephrology is following the patient for RAMBO on CKD III. Patient seen and examined. No verbal communication. Possible hospice. Bps low. Outpatient Medications:     Medications Prior to Admission: insulin glargine (BASAGLAR KWIKPEN) 100 UNIT/ML injection pen, Inject 30 Units into the skin 2 times daily  bumetanide (BUMEX) 1 MG tablet, Take 1 tablet by mouth daily . hold if SBP less than 90 mm hg  lactulose (CHRONULAC) 10 GM/15ML solution, Take 30 mLs by mouth daily Increase to two times daily if no BM.  midodrine (PROAMATINE) 5 MG tablet, Take 3 tablets by mouth 3 times daily (with meals)  rifaximin (XIFAXAN) 550 MG tablet, Take 550 mg by mouth 2 times daily  spironolactone (ALDACTONE) 50 MG tablet, Take 50 mg by mouth daily  sertraline (ZOLOFT) 50 MG tablet, Take 50 mg by mouth daily  insulin lispro (HUMALOG KWIKPEN) 100 UNIT/ML pen, Inject 10 Units into the skin 3 times daily (before meals)  levothyroxine (SYNTHROID) 150 MCG tablet, Take 300 mcg by mouth Daily 45 minutes before breakfast   Insulin Pen Needle 29G X 12MM MISC, 1 each by Does not apply route daily  Lancets MISC, To check blood glucose three times daily before meals and as needed PRN  ondansetron (ZOFRAN ODT) 4 MG disintegrating tablet, Take 1 tablet by mouth every 8 hours as needed for Nausea or Vomiting    Current Medications:     Scheduled Meds:    insulin glargine  10 Units Subcutaneous Nightly    aspirin  81 mg Oral Daily    atorvastatin  40 mg Oral Nightly    lactulose  20 g Oral TID    albumin human  25 g Intravenous Q6H    octreotide  100 mcg Subcutaneous Q6H    sucralfate  1 g Oral 4x Daily AC & HS    cefTRIAXone (ROCEPHIN) IV  1 g Intravenous Q24H    [Held by provider] bumetanide  1 mg Oral Daily    No components found for: UEOS        Impression and Plan:  1. RAMBO on CKD III likely due to type 1 HRS: on Midodrine, IV Albumin, octreotide, IV fluids. Worsening. 2. Hyponatremia, mild due to cirrhosis and decreased free water excretion from RAMBO  3. Metabolic acidosis : pH 8.16 if worsens will add IV bicarb  4. Macrocytic Anemia  5. Thrombocytopenia  6. Hypotension on Midodrine  7. Cirrhosis with ascites  8. New onset CVA  9. DM  10. UTI    Continue treatment for HRS for now but agree with hospice. Please don't hesitate to call with any questions.   Electronically signed by Shirin Rush DO on 1/18/2020 at 11:38 AM

## 2020-01-18 NOTE — PROGRESS NOTES
to assess as pt does not verbalize   Capillary Refill: Brisk,< 3 seconds   Peripheral Pulses: +2 palpable, equal bilaterally       Labs:   Recent Labs     01/17/20  0534   WBC 5.5   HGB 10.7*   HCT 33.0*   PLT 69*     Recent Labs     01/18/20  0539   *   K 4.6      CO2 14*   BUN 35*   CREATININE 3.9*   CALCIUM 8.4*     Recent Labs     01/17/20  0534   AST 36   ALT 26   BILIDIR 0.3   BILITOT 0.9   ALKPHOS 113     Recent Labs     01/16/20  0542   INR 1.39*     No results for input(s): CKTOTAL, TROPONINI in the last 72 hours. Urinalysis:      Lab Results   Component Value Date    NITRU NEGATIVE 01/15/2020    WBCUA > 200 01/15/2020    BACTERIA MANY 01/15/2020    RBCUA 25-50 01/15/2020    BLOODU LARGE 01/15/2020    SPECGRAV 1.015 01/15/2020    GLUCOSEU >= 1000 11/21/2019       Radiology:   All imaging reviewed     Diet: DIET LOW SODIUM 2 GM; 1500 ml      Code Status: DNR-CC            Electronically signed by Dave Chan MD on 1/18/2020 at 9:18 AM

## 2020-01-19 LAB
ANION GAP SERPL CALCULATED.3IONS-SCNC: 15 MEQ/L (ref 8–16)
BASOPHILS # BLD: 0.5 %
BASOPHILS ABSOLUTE: 0 THOU/MM3 (ref 0–0.1)
BUN BLDV-MCNC: 33 MG/DL (ref 7–22)
CALCIUM SERPL-MCNC: 8.1 MG/DL (ref 8.5–10.5)
CHLORIDE BLD-SCNC: 106 MEQ/L (ref 98–111)
CO2: 14 MEQ/L (ref 23–33)
CREAT SERPL-MCNC: 3.4 MG/DL (ref 0.4–1.2)
EOSINOPHIL # BLD: 0 %
EOSINOPHILS ABSOLUTE: 0 THOU/MM3 (ref 0–0.4)
ERYTHROCYTE [DISTWIDTH] IN BLOOD BY AUTOMATED COUNT: 14.8 % (ref 11.5–14.5)
ERYTHROCYTE [DISTWIDTH] IN BLOOD BY AUTOMATED COUNT: 53.1 FL (ref 35–45)
GFR SERPL CREATININE-BSD FRML MDRD: 14 ML/MIN/1.73M2
GLUCOSE BLD-MCNC: 154 MG/DL (ref 70–108)
GLUCOSE BLD-MCNC: 165 MG/DL (ref 70–108)
GLUCOSE BLD-MCNC: 201 MG/DL (ref 70–108)
GLUCOSE BLD-MCNC: 318 MG/DL (ref 70–108)
GLUCOSE BLD-MCNC: 324 MG/DL (ref 70–108)
HCT VFR BLD CALC: 29.3 % (ref 37–47)
HEMOGLOBIN: 9.3 GM/DL (ref 12–16)
IMMATURE GRANS (ABS): 0.02 THOU/MM3 (ref 0–0.07)
IMMATURE GRANULOCYTES: 0.5 %
LYMPHOCYTES # BLD: 9 %
LYMPHOCYTES ABSOLUTE: 0.3 THOU/MM3 (ref 1–4.8)
MAGNESIUM: 2.2 MG/DL (ref 1.6–2.4)
MCH RBC QN AUTO: 31.8 PG (ref 26–33)
MCHC RBC AUTO-ENTMCNC: 31.7 GM/DL (ref 32.2–35.5)
MCV RBC AUTO: 100.3 FL (ref 81–99)
MONOCYTES # BLD: 10.3 %
MONOCYTES ABSOLUTE: 0.4 THOU/MM3 (ref 0.4–1.3)
NUCLEATED RED BLOOD CELLS: 0 /100 WBC
PLATELET # BLD: 53 THOU/MM3 (ref 130–400)
PMV BLD AUTO: 9.6 FL (ref 9.4–12.4)
POTASSIUM SERPL-SCNC: 4.4 MEQ/L (ref 3.5–5.2)
RBC # BLD: 2.92 MILL/MM3 (ref 4.2–5.4)
SEG NEUTROPHILS: 79.7 %
SEGMENTED NEUTROPHILS ABSOLUTE COUNT: 3 THOU/MM3 (ref 1.8–7.7)
SODIUM BLD-SCNC: 135 MEQ/L (ref 135–145)
WBC # BLD: 3.8 THOU/MM3 (ref 4.8–10.8)

## 2020-01-19 PROCEDURE — 6370000000 HC RX 637 (ALT 250 FOR IP): Performed by: INTERNAL MEDICINE

## 2020-01-19 PROCEDURE — 85025 COMPLETE CBC W/AUTO DIFF WBC: CPT

## 2020-01-19 PROCEDURE — 82948 REAGENT STRIP/BLOOD GLUCOSE: CPT

## 2020-01-19 PROCEDURE — 2580000003 HC RX 258: Performed by: INTERNAL MEDICINE

## 2020-01-19 PROCEDURE — 36415 COLL VENOUS BLD VENIPUNCTURE: CPT

## 2020-01-19 PROCEDURE — 6360000002 HC RX W HCPCS: Performed by: INTERNAL MEDICINE

## 2020-01-19 PROCEDURE — 1200000000 HC SEMI PRIVATE

## 2020-01-19 PROCEDURE — P9047 ALBUMIN (HUMAN), 25%, 50ML: HCPCS | Performed by: INTERNAL MEDICINE

## 2020-01-19 PROCEDURE — 99232 SBSQ HOSP IP/OBS MODERATE 35: CPT | Performed by: INTERNAL MEDICINE

## 2020-01-19 PROCEDURE — 99233 SBSQ HOSP IP/OBS HIGH 50: CPT | Performed by: INTERNAL MEDICINE

## 2020-01-19 PROCEDURE — 2500000003 HC RX 250 WO HCPCS: Performed by: INTERNAL MEDICINE

## 2020-01-19 PROCEDURE — 6370000000 HC RX 637 (ALT 250 FOR IP): Performed by: PHYSICIAN ASSISTANT

## 2020-01-19 PROCEDURE — 80048 BASIC METABOLIC PNL TOTAL CA: CPT

## 2020-01-19 PROCEDURE — 83735 ASSAY OF MAGNESIUM: CPT

## 2020-01-19 RX ADMIN — MIDODRINE HYDROCHLORIDE 15 MG: 10 TABLET ORAL at 09:30

## 2020-01-19 RX ADMIN — SERTRALINE 50 MG: 50 TABLET, FILM COATED ORAL at 09:30

## 2020-01-19 RX ADMIN — LEVOTHYROXINE SODIUM 300 MCG: 150 TABLET ORAL at 09:30

## 2020-01-19 RX ADMIN — ALBUMIN (HUMAN) 25 G: 0.25 INJECTION, SOLUTION INTRAVENOUS at 12:08

## 2020-01-19 RX ADMIN — SODIUM BICARBONATE: 84 INJECTION, SOLUTION INTRAVENOUS at 12:08

## 2020-01-19 RX ADMIN — INSULIN LISPRO 4 UNITS: 100 INJECTION, SOLUTION INTRAVENOUS; SUBCUTANEOUS at 20:52

## 2020-01-19 RX ADMIN — MIDODRINE HYDROCHLORIDE 15 MG: 10 TABLET ORAL at 12:07

## 2020-01-19 RX ADMIN — OCTREOTIDE ACETATE 100 MCG: 100 INJECTION, SOLUTION INTRAVENOUS; SUBCUTANEOUS at 09:55

## 2020-01-19 RX ADMIN — SODIUM CHLORIDE: 9 INJECTION, SOLUTION INTRAVENOUS at 09:33

## 2020-01-19 RX ADMIN — ALBUMIN (HUMAN) 25 G: 0.25 INJECTION, SOLUTION INTRAVENOUS at 06:52

## 2020-01-19 RX ADMIN — ALBUMIN (HUMAN) 25 G: 0.25 INJECTION, SOLUTION INTRAVENOUS at 00:45

## 2020-01-19 RX ADMIN — INSULIN LISPRO 4 UNITS: 100 INJECTION, SOLUTION INTRAVENOUS; SUBCUTANEOUS at 12:07

## 2020-01-19 RX ADMIN — INSULIN GLARGINE 10 UNITS: 100 INJECTION, SOLUTION SUBCUTANEOUS at 23:11

## 2020-01-19 RX ADMIN — ASPIRIN 81 MG 81 MG: 81 TABLET ORAL at 09:30

## 2020-01-19 ASSESSMENT — PAIN SCALES - GENERAL
PAINLEVEL_OUTOF10: 0

## 2020-01-19 ASSESSMENT — PAIN SCALES - WONG BAKER

## 2020-01-19 NOTE — PROGRESS NOTES
Hospitalist Progress Note    Patient:  Marlon Granger      Unit/Bed:4K-15/015-A    YOB: 1960    MRN: [id number]       Acct: [de-identified]     PCP: Caron Pena PA-C    Date of Admission: 1/14/2020    Active Hospital Problems    Diagnosis Date Noted    Hyperglycemia [R73.9] 01/14/2020    Thrombocytopenia ([address] [D69.6] 10/04/2019    Decompensation of cirrhosis of liver ([address] [K72.90]     Generalized abdominal pain [R10.84]     Coagulopathy ([address] [D68.9]     Morbid obesity ([address] [E66.01] 19/25/9094    Alcoholic cirrhosis of liver with ascites ([facility] [K70.31] 05/16/2019          Assessment/Plan:     1. Type 2 diabetes mellitus uncontrolled, hyperosmolar hyperglycemic without coma: presented with blood sugars in 1000s. Pt has known uncontrolled Type 2 DM. Admits to not have been taking her insulin at home. Home dose is Lantus 30 BID + Humalog sliding scale. Pt started on insulin drip, weaned off and started Lantus 20U BID + Humalog Sliding scale. No evidence of DKA or elevated AG. Accu-checks. Diabetic Diet. Pt having low blood sugar readings now given poor PO intake. Will change to Lantus 10U daily.      2. Alcoholic Decompensated Liver Cirrhosis with Ascites: 2/2 to alcohol abuse. Pt gets weekly paracentesis. Pt complaining of abdominal pain, paracentesis on 1/15 with removal of 8L, negative for SBP. Resume Lactulose and Rifaximin. Hold Bumex and Aldactone given RAMBO. GI following.      3. Abdominal Pain: complaining of RUQ and diffuse abdominal pain. No fevers or evidence of SIRS. Paracentesis on 1/15 with removal of 8L, negative for SBP. Having nausea and vomiting. Abdominal Xray no ileus or obstruction. Still having nausea and vomiting. CT A/P showing cirrhosis with ascites and anasarca.      4. RAMBO on CKD Stage 3: baseline Cr 2.0-2.2.  On admission elevated to 3.0, continues to get worse to 3.3 and 3.5 to 3.7 with IV fluids. Likely HRS Cont Midodrine, Albumin, and verbalize   Capillary Refill: Brisk,< 3 seconds   Peripheral Pulses: +2 palpable, equal bilaterally       Labs:   Recent Labs     01/19/20  0551   WBC 3.8*   HGB 9.3*   HCT 29.3*   PLT 53*     Recent Labs     01/19/20  0551      K 4.4      CO2 14*   BUN 33*   CREATININE 3.4*   CALCIUM 8.1*     Recent Labs     01/17/20  0534   AST 36   ALT 26   BILIDIR 0.3   BILITOT 0.9   ALKPHOS 113     No results for input(s): INR in the last 72 hours. No results for input(s): Willizeyade Black in the last 72 hours. Urinalysis:      Lab Results   Component Value Date    NITRU NEGATIVE 01/15/2020    WBCUA > 200 01/15/2020    BACTERIA MANY 01/15/2020    RBCUA 25-50 01/15/2020    BLOODU LARGE 01/15/2020    SPECGRAV 1.015 01/15/2020    GLUCOSEU >= 1000 11/21/2019       Radiology:   All imaging reviewed     Diet: DIET LOW SODIUM 2 GM; 1500 ml      Code Status: DNR-CC              Electronically signed by Mp Irizarry MD on 1/19/2020 at 12:28 PM

## 2020-01-19 NOTE — PROGRESS NOTES
Renal Progress Note  [facility]    Patient :  Héctor Vaughn; 62 y.o. MRN# [id number]  Location:  [facility]15/015-A  Attending:  Vesta Ceja MD  Admit Date:  1/14/2020   Hospital Day: 5      Subjective:     Nephrology is following the patient for RAMBO on CKD III. Patient seen and examined. She is more awake today. Plan is for pleurex catheter placement then DC to hospice. Outpatient Medications:     Medications Prior to Admission: insulin glargine (BASAGLAR KWIKPEN) 100 UNIT/ML injection pen, Inject 30 Units into the skin 2 times daily  bumetanide (BUMEX) 1 MG tablet, Take 1 tablet by mouth daily . hold if SBP less than 90 mm hg  lactulose (CHRONULAC) 10 GM/15ML solution, Take 30 mLs by mouth daily Increase to two times daily if no BM.  midodrine (PROAMATINE) 5 MG tablet, Take 3 tablets by mouth 3 times daily (with meals)  rifaximin (XIFAXAN) 550 MG tablet, Take 550 mg by mouth 2 times daily  spironolactone (ALDACTONE) 50 MG tablet, Take 50 mg by mouth daily  sertraline (ZOLOFT) 50 MG tablet, Take 50 mg by mouth daily  insulin lispro (HUMALOG KWIKPEN) 100 UNIT/ML pen, Inject 10 Units into the skin 3 times daily (before meals)  levothyroxine (SYNTHROID) 150 MCG tablet, Take 300 mcg by mouth Daily 45 minutes before breakfast   Insulin Pen Needle 29G X 12MM MISC, 1 each by Does not apply route daily  Lancets MISC, To check blood glucose three times daily before meals and as needed PRN  ondansetron (ZOFRAN ODT) 4 MG disintegrating tablet, Take 1 tablet by mouth every 8 hours as needed for Nausea or Vomiting    Current Medications:     Scheduled Meds:    insulin glargine  10 Units Subcutaneous Nightly    aspirin  81 mg Oral Daily    atorvastatin  40 mg Oral Nightly    lactulose  20 g Oral TID    albumin human  25 g Intravenous Q6H    octreotide  100 mcg Subcutaneous Q6H    cefTRIAXone (ROCEPHIN) IV  1 g Intravenous Q24H    [Held by provider] bumetanide  1 mg Oral Daily    levothyroxine  300 mcg Oral Daily    midodrine  15 mg Oral TID WC    sertraline  50 mg Oral Daily    [Held by provider] spironolactone  50 mg Oral Daily    phosphorus replacement protocol   Other RX Placeholder    sodium chloride flush  10 mL Intravenous 2 times per day    insulin lispro  0-12 Units Subcutaneous TID     insulin lispro  0-6 Units Subcutaneous Nightly     Continuous Infusions:    sodium bicarbonate infusion      dextrose       PRN Meds:  traMADol, sodium chloride flush, ondansetron, potassium chloride **OR** potassium alternative oral replacement **OR** potassium chloride, polyethylene glycol, insulin regular, dextrose, glucose, glucagon (rDNA), dextrose    Input/Output:       I/O last 3 completed shifts: In: 2133.9 [P.O.:120; I.V.:.9]  Out: 430 [Urine:150; Drains:280]. Patient Vitals for the past 96 hrs (Last 3 readings):   Weight   20 0322 228 lb 3.2 oz (103.5 kg)   20 0445 233 lb 6.4 oz (105.9 kg)       Vital Signs:   Temperature:  Temp: 98.1 °F (36.7 °C)  TMax:   Temp (24hrs), Av °F (36.7 °C), Min:97.6 °F (36.4 °C), Max:98.3 °F (36.8 °C)    Respirations:  Resp: 16  Pulse:   Pulse: 74  BP:    BP: (!) 114/58  BP Range: Systolic (05KRG), LZB:169 , Min:103 , QWB:493       Diastolic (17PEU), ZJX:32, Min:57, Max:81      Physical Examination:     General:  More awake today, communicating more + dysphasia  HEENT: NC/AT/ MMM  Chest:               Clear B/L no W/R/R  Cardiac:  S1 S2   Abdomen: Soft, mildly distended  Neuro:  Right sided weakness  SKIN:  No rashes, good skin turgor.   Extremities:  No edema, no cyanosis    Labs:       Recent Labs     20  0534 20  0551   WBC 5.5 3.8*   RBC 3.34* 2.92*   HGB 10.7* 9.3*   HCT 33.0* 29.3*   MCV 98.8 100.3*   MCH 32.0 31.8   MCHC 32.4 31.7*   PLT 69* 53*   MPV 9.6 9.6      BMP:   Recent Labs     20  0534 20  0539 20  0551   * 133* 135   K 4.9 4.6 4.4    105 106   CO2 14* 14* 14*   BUN

## 2020-01-20 LAB
ANAEROBIC CULTURE: ABNORMAL
ANION GAP SERPL CALCULATED.3IONS-SCNC: 15 MEQ/L (ref 8–16)
BODY FLUID CULTURE, STERILE: ABNORMAL
BODY FLUID CULTURE, STERILE: ABNORMAL
BUN BLDV-MCNC: 34 MG/DL (ref 7–22)
CALCIUM SERPL-MCNC: 8.4 MG/DL (ref 8.5–10.5)
CHLORIDE BLD-SCNC: 107 MEQ/L (ref 98–111)
CO2: 15 MEQ/L (ref 23–33)
CREAT SERPL-MCNC: 3.5 MG/DL (ref 0.4–1.2)
GFR SERPL CREATININE-BSD FRML MDRD: 13 ML/MIN/1.73M2
GLUCOSE BLD-MCNC: 288 MG/DL (ref 70–108)
GLUCOSE BLD-MCNC: 288 MG/DL (ref 70–108)
GLUCOSE BLD-MCNC: 296 MG/DL (ref 70–108)
GLUCOSE BLD-MCNC: 304 MG/DL (ref 70–108)
GLUCOSE BLD-MCNC: 322 MG/DL (ref 70–108)
GRAM STAIN RESULT: ABNORMAL
ORGANISM: ABNORMAL
POTASSIUM SERPL-SCNC: 4.8 MEQ/L (ref 3.5–5.2)
SODIUM BLD-SCNC: 137 MEQ/L (ref 135–145)

## 2020-01-20 PROCEDURE — 80048 BASIC METABOLIC PNL TOTAL CA: CPT

## 2020-01-20 PROCEDURE — 6370000000 HC RX 637 (ALT 250 FOR IP): Performed by: FAMILY MEDICINE

## 2020-01-20 PROCEDURE — 82948 REAGENT STRIP/BLOOD GLUCOSE: CPT

## 2020-01-20 PROCEDURE — 6370000000 HC RX 637 (ALT 250 FOR IP): Performed by: INTERNAL MEDICINE

## 2020-01-20 PROCEDURE — 99232 SBSQ HOSP IP/OBS MODERATE 35: CPT | Performed by: FAMILY MEDICINE

## 2020-01-20 PROCEDURE — 36415 COLL VENOUS BLD VENIPUNCTURE: CPT

## 2020-01-20 PROCEDURE — 1200000000 HC SEMI PRIVATE

## 2020-01-20 PROCEDURE — 99232 SBSQ HOSP IP/OBS MODERATE 35: CPT | Performed by: INTERNAL MEDICINE

## 2020-01-20 PROCEDURE — 6370000000 HC RX 637 (ALT 250 FOR IP): Performed by: PHYSICIAN ASSISTANT

## 2020-01-20 PROCEDURE — 94760 N-INVAS EAR/PLS OXIMETRY 1: CPT

## 2020-01-20 RX ORDER — FENTANYL CITRATE 50 UG/ML
50 INJECTION, SOLUTION INTRAMUSCULAR; INTRAVENOUS ONCE
Status: CANCELLED | OUTPATIENT
Start: 2020-01-20 | End: 2020-01-20

## 2020-01-20 RX ORDER — MIDAZOLAM HYDROCHLORIDE 1 MG/ML
1 INJECTION INTRAMUSCULAR; INTRAVENOUS ONCE
Status: CANCELLED | OUTPATIENT
Start: 2020-01-20 | End: 2020-01-20

## 2020-01-20 RX ORDER — SODIUM CHLORIDE 450 MG/100ML
INJECTION, SOLUTION INTRAVENOUS CONTINUOUS
Status: CANCELLED | OUTPATIENT
Start: 2020-01-20

## 2020-01-20 RX ORDER — CLINDAMYCIN PHOSPHATE 600 MG/50ML
600 INJECTION INTRAVENOUS
Status: CANCELLED | OUTPATIENT
Start: 2020-01-20

## 2020-01-20 RX ADMIN — INSULIN LISPRO 12 UNITS: 100 INJECTION, SOLUTION INTRAVENOUS; SUBCUTANEOUS at 17:53

## 2020-01-20 RX ADMIN — LEVOTHYROXINE SODIUM 300 MCG: 150 TABLET ORAL at 05:01

## 2020-01-20 RX ADMIN — INSULIN LISPRO 9 UNITS: 100 INJECTION, SOLUTION INTRAVENOUS; SUBCUTANEOUS at 13:46

## 2020-01-20 RX ADMIN — INSULIN GLARGINE 10 UNITS: 100 INJECTION, SOLUTION SUBCUTANEOUS at 21:42

## 2020-01-20 RX ADMIN — INSULIN LISPRO 5 UNITS: 100 INJECTION, SOLUTION INTRAVENOUS; SUBCUTANEOUS at 19:45

## 2020-01-20 RX ADMIN — INSULIN LISPRO 6 UNITS: 100 INJECTION, SOLUTION INTRAVENOUS; SUBCUTANEOUS at 08:51

## 2020-01-20 ASSESSMENT — PAIN SCALES - WONG BAKER

## 2020-01-20 ASSESSMENT — PAIN SCALES - GENERAL
PAINLEVEL_OUTOF10: 0
PAINLEVEL_OUTOF10: 0

## 2020-01-20 NOTE — PROGRESS NOTES
1320: Updated on plan of care by RN. Hospice Staff saw pt this am and plan to follow at discharge. Staff to call if Palliative care needs arise.

## 2020-01-20 NOTE — PROGRESS NOTES
[facility]    Renal inpatient Progress Note  1/20/2020 12:07 PM      Pt Name:   Viky Gibson  YOB: 1960  Attending: Jayy Maria MD    Chief Complaint:   Viky Gibson is a 62 y.o. female being followed by nephrology for RAMBO     Interval History : patient seen and examined by me. Confused and she is not in distress. Apparently will go [facility] hosp[facility] the [facility]     Scheduled Medications :   insulin lispro  0-18 Units Subcutaneous TID WC    insulin lispro  0-9 Units Subcutaneous Nightly    insulin glargine  10 Units Subcutaneous Nightly    aspirin  81 mg Oral Daily    atorvastatin  40 mg Oral Nightly    lactulose  20 g Oral TID    [Held by provider] albumin human  25 g Intravenous Q6H    octreotide  100 mcg Subcutaneous Q6H    [Held by provider] cefTRIAXone (ROCEPHIN) IV  1 g Intravenous Q24H    [Held by provider] bumetanide  1 mg Oral Daily    levothyroxine  300 mcg Oral Daily    midodrine  15 mg Oral TID WC    sertraline  50 mg Oral Daily    [Held by provider] spironolactone  50 mg Oral Daily    phosphorus replacement protocol   Other RX Placeholder    sodium chloride flush  10 mL Intravenous 2 times per day      [Held by provider] sodium bicarbonate infusion 75 mL/hr at 01/19/20 1208    dextrose          Vitals :  /64   Pulse 79   Temp 98 °F (36.7 °C) (Oral)   Resp 17   Ht 5' 4\" (1.626 m)   Wt 228 lb 3.2 oz (103.5 kg)   SpO2 99%   BMI 39.17 kg/m²     24HR INTAKE/OUTPUT:      Intake/Output Summary (Last 24 hours) at 1/20/2020 1207  Last data filed at 1/20/2020 0854  Gross per 24 hour   Intake 650 ml   Output 100 ml   Net 550 ml     Last 3 weights  Wt Readings from Last 3 Encounters:   01/17/20 228 lb 3.2 oz (103.5 kg)   12/14/19 221 lb 1.6 oz (100.3 kg)   11/21/19 233 lb (105.7 kg)        Physical Exam :  General -- well developed and ill nourished  HEENT-normal external appearance of both ears and nose.   Mouth/throat  - oropharynx

## 2020-01-20 NOTE — CARE COORDINATION
Update: client moved to [place] [id number]; updated [facility], [address] [place] [address]  Electronically signed by Ruddy Gama RN on 1/20/2020 at 7:20 AM

## 2020-01-20 NOTE — CARE COORDINATION
1/20/20, 7:18 AM    DISCHARGE BARRIERS        Patient transferred to [facility]. Report given to unit Kirti Guaman, regarding discharge plan for this patient.

## 2020-01-20 NOTE — PROGRESS NOTES
Hospice visit to check on discharge planning and see if able to assist. Updated per primary nurse Bettina Gibson RN that order for pluerx drained placed today and that IR not answering phone- possibly not in due to holiday. Will await to see when drain time set and plan for admission to hospice service at [facility] when discharged. Patient lying in bed with no s/s of discomfort or distress. Alert, denied pain or discomfort. Able to answer yes/no questions but difficult to verbally communicate otherwise. Concern on insurance with Medicaid voiced- told her that NARCISA Blackman looking into matter. Denied further concerns/questions. Call placed to daughter Dutch Ganser to see if questions about hospice and update that no plan of discharge today, as awaiting drain placement in next day or so. Asked if she would need to sign paperwork when patient getting to facility, discussed with her that will need signed at some time but if unable to do when nurse able to admit that can take verbal permission for admission and meet for her at later time for signature or mail to her. Verbalized understanding. Denied further questions at this time, encouraged to call if would have. Will follow and assist with plan of care for discharge with hospice,  when drain placed. Primary nurse Bettina Gibson RN will call if IR would call back and set time for drain placement, otherwise plan of discharge will be when drain placed either on Tuesday or Wednesday.

## 2020-01-20 NOTE — PROGRESS NOTES
Hospitalist Progress Note    Patient:  Camilo       Unit/Bed:5K-28/028-A    YOB: 1960    MRN: [id number]       Acct: [de-identified]     PCP: Charmayne Hatter, PA-C    Date of Admission: 1/14/2020    Chief Complaint: weakness, nausea, decreased PO intake, abdominal pain     Hospital Course:     Please see H/P for details. In summary, this is a 62year old female with PMH of alcoholic liver cirrhosis, CKD III, uncontrolled DM2 who presents from [facility] with symptoms of nausea, decreased PO intake, weakness, and abdominal pain. Pt found to have blood sugars in 1000's, not in DKA. Pt admits to not to be taking her insulin at home. Started on insulin drip and admitted to step down unit. Patient gets weekly paracenteses, last paracentesis was 1/15/2020 with removal of 8 L ascitic fluid. Ascitic fluid analysis came back transudates. Ascitic fluid culture initially no growth, now grew Staphylococcus epidermidis, very light growth. Patient received 4 doses of Rocephin 1 g IV every 24 hours so far. Patient has been treated for RAMBO on CKD stage III as well. Nephrology on board. She has been treated for UTI as well, UA on arrival showed large blood, large leukocyte esterase, negative nitrite, pyuria more than 200. No urine culture on file. As mentioned, patient received 4 doses of Rocephin. Patient denies UTI symptoms. On 1/16/2020, code stroke was called and patient found to have acute CVA. Neurology was consulted. Patient not a tPA candidate given thrombocytopenia and coagulopathy and being outside window. On 1/17/2019, Dr. Brigid Hill ( hospitalist) had a discussion with patient's family over the phone and in person, requesting comfort care given patient's poor overall prognosis, CODE STATUS was changed to DNR CC. Hospice referral sent. Patient was seen by hospice RN Kirstin Albarado. Patient was transferred to [facility] unit on 1/19/2020.   Plan is for Pleurx catheter drained for female   HEENT: Pupils equal, round, and reactive to light. Conjunctivae/corneas clear. Neck: Supple, with full range of motion. No jugular venous distention. Trachea midline. Respiratory:  Normal respiratory effort. Clear to auscultation, bilaterally without Rales/Wheezes/Rhonchi. Cardiovascular: normal rate, regular rhythm with normal S1/S2 without murmurs, rubs or gallops. Abdomen: soft, distended with + fluid wave shift   Musculoskeletal: passive and active ROM x 4 extremities. Neurological exam reveals alert, oriented x3, able to comprehend and follows command but with slow speech, Motor: 4/5 RUE, otherwise 5/5 the rest   Exam of extremities: peripheral pulses normal, no pedal edema, no clubbing or cyanosis      Labs:   Recent Labs     01/19/20  0551   WBC 3.8*   HGB 9.3*   HCT 29.3*   PLT 53*     Recent Labs     01/20/20  0708      K 4.8      CO2 15*   BUN 34*   CREATININE 3.5*   CALCIUM 8.4*     No results for input(s): AST, ALT, BILIDIR, BILITOT, ALKPHOS in the last 72 hours. No results for input(s): INR in the last 72 hours. No results for input(s): August Chivo in the last 72 hours. Urinalysis:      Lab Results   Component Value Date    NITRU NEGATIVE 01/15/2020    WBCUA > 200 01/15/2020    BACTERIA MANY 01/15/2020    RBCUA 25-50 01/15/2020    BLOODU LARGE 01/15/2020    SPECGRAV 1.015 01/15/2020    GLUCOSEU >= 1000 11/21/2019       Radiology: All imaging reviewed     Diet: DIET LOW SODIUM 2 GM; 1500 ml      Code Status: DNR-CC      Assessment/Plan:     1. Hyperosmolar hyperglycemic without coma, resolved/Type 2 diabetes mellitus, uncontrolled, /: presented with blood sugars in 1000s. Pt has known uncontrolled Type 2 DM. Admits to not have been taking her insulin at home. Home dose is Lantus 30 BID + Humalog sliding scale. Pt started on insulin drip, weaned off and started Lantus 20U BID + Humalog Sliding scale. No evidence of DKA or elevated AG. Accu-checks. Diabetic Diet.  BG 150s to 300s. Cont Lantus 10U daily. increase SSI from medium to high dose.      2. Alcoholic Decompensated Liver Cirrhosis with Ascites secondary to alcohol abuse. MELDs score 21 points ( 19.6% limited 3-month mortality). Pt gets weekly paracentesis. Pt denies abdominal pain, paracentesis on 1/15 with removal of 8L, positive for very light growth of Staphylococcus epidermidis.  On Rocephin, transition to oral antibiotics on discharge, treat for total of 7 days. Resume Lactulose and Rifaximin. Hold Bumex and Aldactone given RAMBO. GI s/o 1/17/20.      3. Abdominal Pain likely due to problem #2: complaining of RUQ and diffuse abdominal pain per previous note, denies abdominal pain today. No fevers or evidence of SIRS. Paracentesis on 1/15 with removal of 8L, positive for Staphylococcus epidermidis. Abdominal Xray no ileus or obstruction. CT A/P showing cirrhosis with ascites and anasarca.      4. RAMBO on CKD Stage 3, worsening: baseline Cr 2.0-2.2. On admission elevated to 3.0, continues to get worse, 3.5 today from 3.4 yesterday. Managed with IV fluids. Likely HRS. Cont Midodrine, Albumin, and Octreotide.   Appreciate Nephrology assistance. Avoid nephrotoxic agents. Strict I/Os. Daily weights.      5. Hyponatremia, likely hypervolemic secondary to cirrhosis, resolved. Patient is now comfort care, patient agreed not to do any blood tests anymore.     6. Chronic Hypotension, secondary to cirrhosis with portal hypotension, improving. Cont Midodrine. No evidence of infection/SIRS at this time.      7. Asymptomatic bacteriuria: UA grossly abnormal with +LE, many bacteria and few yeasts. No urine culture on file. Patient denies UTI symptoms. received IV Rocephin.       8. Acute Left MCA CVA: stroke code called on 1/16. Found to have acute CVA. Pt not tPA candidate given low Plt and coagulapathy and being outside window. Poor prognosis.      9. Severe Hypothyroidism: TSH 65 and Free T4 0.77.  On home Synthroid 300 mcg daily. Not being absorbed due to diffuse body/bowel anasarca? Family requesting hospice. 10.  Anion gap metabolic acidosis, likely due to RAMBO on CKD stage III, slightly improving. Patient is now comfort care, patient agreed not to do blood test anymore.     Goals of Care: family discussion over the phone and in person, requesting comfort care given patients poor overall prognosis per Dr. Jessy Polanco. Change to [facility]. Hospice meeting on 1/17.  Hospice to continue to follow.        Dispo: awaiting for IR pleurex catheter drain for ascitic fluid for comfort then discharge to hospice ECF        Electronically signed by Deonte Farnsworth MD on 1/20/2020 at 9:26 AM

## 2020-01-21 ENCOUNTER — APPOINTMENT (OUTPATIENT)
Dept: INTERVENTIONAL RADIOLOGY/VASCULAR | Age: 60
DRG: 420 | End: 2020-01-21
Attending: INTERNAL MEDICINE
Payer: MEDICAID

## 2020-01-21 LAB
APTT: 25.6 SECONDS (ref 22–38)
ERYTHROCYTE [DISTWIDTH] IN BLOOD BY AUTOMATED COUNT: 15.5 % (ref 11.5–14.5)
ERYTHROCYTE [DISTWIDTH] IN BLOOD BY AUTOMATED COUNT: 54.4 FL (ref 35–45)
GLUCOSE BLD-MCNC: 264 MG/DL (ref 70–108)
GLUCOSE BLD-MCNC: 270 MG/DL (ref 70–108)
GLUCOSE BLD-MCNC: 295 MG/DL (ref 70–108)
GLUCOSE BLD-MCNC: 302 MG/DL (ref 70–108)
GLUCOSE BLD-MCNC: 305 MG/DL (ref 70–108)
HCT VFR BLD CALC: 31.1 % (ref 37–47)
HEMOGLOBIN: 9.8 GM/DL (ref 12–16)
INR BLD: 1.31 (ref 0.85–1.13)
MCH RBC QN AUTO: 31.3 PG (ref 26–33)
MCHC RBC AUTO-ENTMCNC: 31.5 GM/DL (ref 32.2–35.5)
MCV RBC AUTO: 99.4 FL (ref 81–99)
PLATELET # BLD: 53 THOU/MM3 (ref 130–400)
PMV BLD AUTO: 9.9 FL (ref 9.4–12.4)
RBC # BLD: 3.13 MILL/MM3 (ref 4.2–5.4)
WBC # BLD: 4.4 THOU/MM3 (ref 4.8–10.8)

## 2020-01-21 PROCEDURE — 36415 COLL VENOUS BLD VENIPUNCTURE: CPT

## 2020-01-21 PROCEDURE — 6360000002 HC RX W HCPCS: Performed by: INTERNAL MEDICINE

## 2020-01-21 PROCEDURE — 0W9G30Z DRAINAGE OF PERITONEAL CAVITY WITH DRAINAGE DEVICE, PERCUTANEOUS APPROACH: ICD-10-PCS | Performed by: RADIOLOGY

## 2020-01-21 PROCEDURE — 2709999900 HC NON-CHARGEABLE SUPPLY

## 2020-01-21 PROCEDURE — 6360000002 HC RX W HCPCS: Performed by: RADIOLOGY

## 2020-01-21 PROCEDURE — 99239 HOSP IP/OBS DSCHRG MGMT >30: CPT | Performed by: FAMILY MEDICINE

## 2020-01-21 PROCEDURE — 6370000000 HC RX 637 (ALT 250 FOR IP): Performed by: INTERNAL MEDICINE

## 2020-01-21 PROCEDURE — C1729 CATH, DRAINAGE: HCPCS

## 2020-01-21 PROCEDURE — 6370000000 HC RX 637 (ALT 250 FOR IP): Performed by: FAMILY MEDICINE

## 2020-01-21 PROCEDURE — 85730 THROMBOPLASTIN TIME PARTIAL: CPT

## 2020-01-21 PROCEDURE — 2580000003 HC RX 258: Performed by: RADIOLOGY

## 2020-01-21 PROCEDURE — 82948 REAGENT STRIP/BLOOD GLUCOSE: CPT

## 2020-01-21 PROCEDURE — C1894 INTRO/SHEATH, NON-LASER: HCPCS

## 2020-01-21 PROCEDURE — 2500000003 HC RX 250 WO HCPCS: Performed by: RADIOLOGY

## 2020-01-21 PROCEDURE — 6360000002 HC RX W HCPCS

## 2020-01-21 PROCEDURE — 2500000003 HC RX 250 WO HCPCS

## 2020-01-21 PROCEDURE — 49418 INSERT TUN IP CATH PERC: CPT

## 2020-01-21 PROCEDURE — 85027 COMPLETE CBC AUTOMATED: CPT

## 2020-01-21 PROCEDURE — 1200000000 HC SEMI PRIVATE

## 2020-01-21 PROCEDURE — C1769 GUIDE WIRE: HCPCS

## 2020-01-21 PROCEDURE — 6370000000 HC RX 637 (ALT 250 FOR IP): Performed by: PHYSICIAN ASSISTANT

## 2020-01-21 PROCEDURE — 85610 PROTHROMBIN TIME: CPT

## 2020-01-21 RX ORDER — CIPROFLOXACIN 250 MG/1
250 TABLET, FILM COATED ORAL
Status: DISCONTINUED | OUTPATIENT
Start: 2020-01-21 | End: 2020-01-22 | Stop reason: HOSPADM

## 2020-01-21 RX ORDER — LORAZEPAM 0.5 MG/1
0.5 TABLET ORAL EVERY 4 HOURS PRN
Status: DISCONTINUED | OUTPATIENT
Start: 2020-01-21 | End: 2020-01-22 | Stop reason: HOSPADM

## 2020-01-21 RX ORDER — MORPHINE SULFATE 20 MG/ML
5 SOLUTION ORAL EVERY 4 HOURS PRN
Qty: 30 ML | Refills: 0 | Status: SHIPPED | OUTPATIENT
Start: 2020-01-21 | End: 2020-02-10

## 2020-01-21 RX ORDER — MORPHINE SULFATE 20 MG/ML
5 SOLUTION ORAL EVERY 4 HOURS PRN
Status: DISCONTINUED | OUTPATIENT
Start: 2020-01-21 | End: 2020-01-22 | Stop reason: HOSPADM

## 2020-01-21 RX ORDER — MIDAZOLAM HYDROCHLORIDE 1 MG/ML
1 INJECTION INTRAMUSCULAR; INTRAVENOUS ONCE
Status: COMPLETED | OUTPATIENT
Start: 2020-01-21 | End: 2020-01-21

## 2020-01-21 RX ORDER — LORAZEPAM 0.5 MG/1
0.5 TABLET ORAL EVERY 4 HOURS PRN
Qty: 30 TABLET | Refills: 0 | Status: SHIPPED | OUTPATIENT
Start: 2020-01-21 | End: 2020-02-20

## 2020-01-21 RX ORDER — FENTANYL CITRATE 50 UG/ML
50 INJECTION, SOLUTION INTRAMUSCULAR; INTRAVENOUS ONCE
Status: COMPLETED | OUTPATIENT
Start: 2020-01-21 | End: 2020-01-21

## 2020-01-21 RX ORDER — SODIUM CHLORIDE 450 MG/100ML
INJECTION, SOLUTION INTRAVENOUS CONTINUOUS
Status: DISCONTINUED | OUTPATIENT
Start: 2020-01-21 | End: 2020-01-22 | Stop reason: HOSPADM

## 2020-01-21 RX ORDER — ATORVASTATIN CALCIUM 40 MG/1
40 TABLET, FILM COATED ORAL NIGHTLY
Qty: 30 TABLET | Refills: 0
Start: 2020-01-21

## 2020-01-21 RX ORDER — MIDAZOLAM HYDROCHLORIDE 1 MG/ML
0.5 INJECTION INTRAMUSCULAR; INTRAVENOUS ONCE
Status: COMPLETED | OUTPATIENT
Start: 2020-01-21 | End: 2020-01-21

## 2020-01-21 RX ORDER — POLYETHYLENE GLYCOL 3350 17 G/17G
17 POWDER, FOR SOLUTION ORAL DAILY PRN
Qty: 30 EACH | Refills: 0 | Status: SHIPPED | OUTPATIENT
Start: 2020-01-21 | End: 2020-02-20

## 2020-01-21 RX ORDER — CIPROFLOXACIN 250 MG/1
250 TABLET, FILM COATED ORAL
Qty: 4 TABLET | Refills: 0 | Status: SHIPPED | OUTPATIENT
Start: 2020-01-21 | End: 2020-01-24

## 2020-01-21 RX ORDER — FENTANYL CITRATE 50 UG/ML
25 INJECTION, SOLUTION INTRAMUSCULAR; INTRAVENOUS ONCE
Status: COMPLETED | OUTPATIENT
Start: 2020-01-21 | End: 2020-01-21

## 2020-01-21 RX ORDER — CLINDAMYCIN PHOSPHATE 600 MG/50ML
600 INJECTION INTRAVENOUS
Status: COMPLETED | OUTPATIENT
Start: 2020-01-21 | End: 2020-01-21

## 2020-01-21 RX ADMIN — OCTREOTIDE ACETATE 100 MCG: 100 INJECTION, SOLUTION INTRAVENOUS; SUBCUTANEOUS at 10:10

## 2020-01-21 RX ADMIN — MIDAZOLAM 0.5 MG: 1 INJECTION INTRAMUSCULAR; INTRAVENOUS at 08:33

## 2020-01-21 RX ADMIN — SODIUM CHLORIDE: 4.5 INJECTION, SOLUTION INTRAVENOUS at 05:03

## 2020-01-21 RX ADMIN — SERTRALINE 50 MG: 50 TABLET, FILM COATED ORAL at 10:10

## 2020-01-21 RX ADMIN — FENTANYL CITRATE 25 MCG: 50 INJECTION, SOLUTION INTRAMUSCULAR; INTRAVENOUS at 08:30

## 2020-01-21 RX ADMIN — INSULIN LISPRO 9 UNITS: 100 INJECTION, SOLUTION INTRAVENOUS; SUBCUTANEOUS at 10:16

## 2020-01-21 RX ADMIN — MIDODRINE HYDROCHLORIDE 15 MG: 10 TABLET ORAL at 10:10

## 2020-01-21 RX ADMIN — OCTREOTIDE ACETATE 100 MCG: 100 INJECTION, SOLUTION INTRAVENOUS; SUBCUTANEOUS at 15:44

## 2020-01-21 RX ADMIN — LEVOTHYROXINE SODIUM 300 MCG: 150 TABLET ORAL at 05:05

## 2020-01-21 RX ADMIN — MIDODRINE HYDROCHLORIDE 15 MG: 10 TABLET ORAL at 17:19

## 2020-01-21 RX ADMIN — INSULIN LISPRO 9 UNITS: 100 INJECTION, SOLUTION INTRAVENOUS; SUBCUTANEOUS at 12:59

## 2020-01-21 RX ADMIN — INSULIN LISPRO 12 UNITS: 100 INJECTION, SOLUTION INTRAVENOUS; SUBCUTANEOUS at 18:23

## 2020-01-21 RX ADMIN — MIDODRINE HYDROCHLORIDE 15 MG: 10 TABLET ORAL at 12:58

## 2020-01-21 RX ADMIN — CIPROFLOXACIN 250 MG: 250 TABLET, FILM COATED ORAL at 15:44

## 2020-01-21 RX ADMIN — TRAMADOL HYDROCHLORIDE 50 MG: 50 TABLET, FILM COATED ORAL at 17:19

## 2020-01-21 RX ADMIN — FENTANYL CITRATE 25 MCG: 50 INJECTION, SOLUTION INTRAMUSCULAR; INTRAVENOUS at 08:33

## 2020-01-21 RX ADMIN — SODIUM CHLORIDE 50000 UNITS: 0.9 IRRIGANT IRRIGATION at 08:40

## 2020-01-21 RX ADMIN — MIDAZOLAM 0.5 MG: 1 INJECTION INTRAMUSCULAR; INTRAVENOUS at 08:30

## 2020-01-21 RX ADMIN — CLINDAMYCIN PHOSPHATE 600 MG: 600 INJECTION, SOLUTION INTRAVENOUS at 11:06

## 2020-01-21 ASSESSMENT — PAIN SCALES - GENERAL
PAINLEVEL_OUTOF10: 2
PAINLEVEL_OUTOF10: 0
PAINLEVEL_OUTOF10: 0
PAINLEVEL_OUTOF10: 8
PAINLEVEL_OUTOF10: 0
PAINLEVEL_OUTOF10: 0

## 2020-01-21 ASSESSMENT — PAIN SCALES - WONG BAKER
WONGBAKER_NUMERICALRESPONSE: 0

## 2020-01-21 NOTE — CARE COORDINATION
1/21/20, 3:09 PM    DISCHARGE ON GOING EVALUATION    Telma Silva [facility] day: 7  [facility] Reason for admit: Hyperglycemia [R73.9]   Procedure: 1/21/2020 Pleurx drain placement. Treatment Plan of Care: Nephrology following, Palliative Care, Hospice, IV fluids, Rocephin, DM management, Phosphorus and Potassium replacement protocol, prn Morphine, Ativan, Zofran, Glycolax, and Ultram, strict I & O, SCD's, discharge planning. Barriers to Discharge: Contract with [facility] and [facility]. PCP: Francois Leon PA-C  Readmission Risk Score: 41%  Patient Goals/Plan/Treatment Preferences: Return to [facility] with Hospice Care.

## 2020-01-21 NOTE — PROGRESS NOTES
Reviewed plan of care with Hospice RN, Gab Speaker this am. No needs of palliative care at this time.

## 2020-01-21 NOTE — PLAN OF CARE
Problem: Falls - Risk of:  Goal: Will remain free from falls  Description  Will remain free from falls  Outcome: Ongoing  Note:   Patient free from falls this shift with call light within reach, slipper socks in, and bed alarm in place. Problem: Falls - Risk of:  Goal: Absence of physical injury  Description  Absence of physical injury  Outcome: Ongoing  Note:   Patient free from falls this shift with call light within reach, slipper socks in, and bed alarm in place. Problem: Risk for Impaired Skin Integrity  Goal: Tissue integrity - skin and mucous membranes  Description  Structural intactness and normal physiological function of skin and  mucous membranes. Outcome: Ongoing  Note:   Skin and mucous membrane moist and intact this shift. Problem: Pain:  Description  Pain management should include both nonpharmacologic and pharmacologic interventions. Goal: Pain level will decrease  Description  Pain level will decrease  Outcome: Ongoing  Note:   Patient has no pain this shift. Problem: Pain:  Description  Pain management should include both nonpharmacologic and pharmacologic interventions. Goal: Control of acute pain  Description  Control of acute pain  Outcome: Ongoing  Note:   Patient has no pain this shift. Problem: Pain:  Description  Pain management should include both nonpharmacologic and pharmacologic interventions. Goal: Control of chronic pain  Description  Control of chronic pain  Outcome: Ongoing  Note:   Patient has no pain this shift. Care plan reviewed with patient. Patient verbalizes understanding of the plan of care and contribute to goal setting.
maintain appropriate glucose levels has stabilized  Description  Ability to maintain appropriate glucose levels has stabilized  Outcome: Ongoing  Note:   Patient admitted with hyperglycemia blood sugars greater 1000,admitted to step-down and placed on glucose stabilizer. Patient is not compliant with diabetic medications. Patient on sliding scale humalog and 10 units of lantus. Blood sugars checked ACHS. Night blood sugar was 288. 2 units of humalog and 10 units of lantus administered. Care plan reviewed with patient and family. Patient and family verbalize understanding of the plan of care and contribute to goal setting.     Electronically signed by Deanna Wilcox RN on 1/20/2020 at 10:39 PM
stabilized  Outcome: Ongoing  Note:   Monitoring glucose before meals and at bedtime, insulin given per sliding scale per [facility], educated on diabetic diet and diabetes management. Care plan reviewed with patient. Patient verbalizes understanding of the plan of care and contribute to goal setting.

## 2020-01-21 NOTE — DISCHARGE SUMMARY
Oral   SpO2: 94% 100% 100% 96%   Weight:       Height:         Weight: Weight: 238 lb 12.8 oz (108.3 kg)     24 hour intake/output:    Intake/Output Summary (Last 24 hours) at 1/21/2020 1255  Last data filed at 1/21/2020 0852  Gross per 24 hour   Intake 220 ml   Output 3500 ml   Net -3280 ml       General appearance: acute on chronically ill appearing female   HEENT: Pupils equal, round, and reactive to light. Conjunctivae/corneas clear. Neck: Supple, with full range of motion. No jugular venous distention. Trachea midline. Respiratory:  Normal respiratory effort. Clear to auscultation, bilaterally without Rales/Wheezes/Rhonchi. Cardiovascular: normal rate, regular rhythm with normal S1/S2 without murmurs, rubs or gallops. Abdomen: (+) pleur-X drain in placed, no active drainage or bleeding noted, soft, distended, mild tenderness around the pleur-X  Musculoskeletal: passive and active ROM x 4 extremities. Neurological exam reveals alert, oriented x3, able to comprehend and follows command but with slow speech, Motor: 4/5 RUE, otherwise 5/5 the rest   Exam of extremities: peripheral pulses normal, no pedal edema, no clubbing or cyanosis        Labs: For convenience and continuity at follow-up the following most recent labs are provided:      CBC:    Lab Results   Component Value Date    WBC 4.4 01/21/2020    HGB 9.8 01/21/2020    HCT 31.1 01/21/2020    PLT 53 01/21/2020       Renal:    Lab Results   Component Value Date     01/20/2020    K 4.8 01/20/2020    K 5.6 01/15/2020     01/20/2020    CO2 15 01/20/2020    BUN 34 01/20/2020    CREATININE 3.5 01/20/2020    CALCIUM 8.4 01/20/2020    PHOS 2.7 01/14/2020         Significant Diagnostic Studies    Radiology:   IR ABSCESS DRAINAGE PERC   Final Result   Status post successful tunneled Pleurx peritoneal drainage catheter insertion. **This report has been created using voice recognition software.   It may contain minor errors which are inherent in voice recognition technology. **      Final report electronically signed by Dr Buck Forte on 1/21/2020 9:00 AM      MRI BRAIN WO CONTRAST   Final Result       1. Small acute infarcts in the left middle cerebral artery territory involving the left insula, left corona radiata and left frontal operculum. 2. Old lacunar infarcts in the bilateral cerebellar hemispheres. 3. Mild chronic microvascular angiopathy. **This report has been created using voice recognition software. It may contain minor errors which are inherent in voice recognition technology. **      Final report electronically signed by Dr. Kianna Hart MD on 1/17/2020 11:01 AM      MRA HEAD WO CONTRAST   Final Result    No focal abnormality identified in the anterior or posterior intracranial circulation. **This report has been created using voice recognition software. It may contain minor errors which are inherent in voice recognition technology. **      Final report electronically signed by Dr. Kianna Hart MD on 1/17/2020 11:06 AM      MRA NECK WO CONTRAST   Final Result    Narrowing of the internal carotid artery takeoffs bilaterally with approximately 60% stenosis on the right and 44% stenosis on the left by NASCET criteria. **This report has been created using voice recognition software. It may contain minor errors which are inherent in voice recognition technology. **      Final report electronically signed by Dr. Kianna Hart MD on 1/17/2020 11:15 AM      CT HEAD WO CONTRAST (CODE STROKE)   Final Result   . 1. Atrophy related changes with chronic small vessel disease. If acute ischemic event is highly suspected her clinical symptoms worsen follow-up MRI with diffusion should be considered as discussed above. **This report has been created using voice recognition software. It may contain minor errors which are inherent in voice recognition technology. **      Final report technology. **      Final report electronically signed by Dr. Palmira Dick on 1/15/2020 11:24 AM      [address]   Final Result   1. Several attempts were made to insert a 5 [place] one-step catheter into the ascites however this was not successful secondary to the patient's large body habitus and the deep positioning of the ascites within the peritoneal cavity. An ultrasound-guided    paracentesis was therefore not performed. The patient will return to the ultrasound Department on 1/15/2020 to attempt an ultrasound-guided paracentesis. **This report has been created using voice recognition software. It may contain minor errors which are inherent in voice recognition technology. **      Final report electronically signed by Dr. Lo Ash on 1/14/2020 3:48 PM             Consults:     [address]  IP CONSULT TO GI  PALLIATIVE CARE EVAL  IP CONSULT TO SOCIAL WORK  IP CONSULT TO SPIRITUAL SERVICES  IP CONSULT TO NEUROLOGY  IP CONSULT TO HOSPICE    Disposition:    [] Home       [] TCU       [] Rehab       [] Psych       [] SNF       [] [place]       [x] Other-ECF with hospice     Condition at Discharge: Stable    Code Status:  DNR-CC     Patient Instructions:    Discharge lab work: none   Activity: activity as tolerated  Diet: DIET GENERAL; Daily Fluid Restriction: 1500 ml      Follow-up visits:   Audria Goldmann, PA-C  [address] [facility]  [phone]      staff-please s/u w/i 7d    CM [address]  [address] [facility].   [address] [facility] [address] [phone]             Discharge Medications:      Verna Salazar   Home Medication Instructions [id number]    Printed on:01/21/20 1255   Medication Information                      atorvastatin (LIPITOR) 40 MG tablet  Take 1 tablet by mouth nightly             ciprofloxacin (CIPRO) 250 MG tablet  Take 1 tablet by mouth Q18H for 3 days

## 2020-01-22 VITALS
HEIGHT: 64 IN | OXYGEN SATURATION: 96 % | SYSTOLIC BLOOD PRESSURE: 110 MMHG | WEIGHT: 238.8 LBS | DIASTOLIC BLOOD PRESSURE: 53 MMHG | RESPIRATION RATE: 16 BRPM | TEMPERATURE: 97.9 F | BODY MASS INDEX: 40.77 KG/M2 | HEART RATE: 72 BPM

## 2020-01-22 LAB — GLUCOSE BLD-MCNC: 266 MG/DL (ref 70–108)

## 2020-01-22 PROCEDURE — 6370000000 HC RX 637 (ALT 250 FOR IP): Performed by: FAMILY MEDICINE

## 2020-01-22 PROCEDURE — 6360000002 HC RX W HCPCS: Performed by: INTERNAL MEDICINE

## 2020-01-22 PROCEDURE — 82948 REAGENT STRIP/BLOOD GLUCOSE: CPT

## 2020-01-22 PROCEDURE — 6370000000 HC RX 637 (ALT 250 FOR IP): Performed by: PHYSICIAN ASSISTANT

## 2020-01-22 PROCEDURE — 6370000000 HC RX 637 (ALT 250 FOR IP): Performed by: INTERNAL MEDICINE

## 2020-01-22 PROCEDURE — 94760 N-INVAS EAR/PLS OXIMETRY 1: CPT

## 2020-01-22 RX ADMIN — LACTULOSE 20 G: 20 SOLUTION ORAL at 09:13

## 2020-01-22 RX ADMIN — ATORVASTATIN CALCIUM 40 MG: 40 TABLET, FILM COATED ORAL at 00:20

## 2020-01-22 RX ADMIN — OCTREOTIDE ACETATE 100 MCG: 100 INJECTION, SOLUTION INTRAVENOUS; SUBCUTANEOUS at 00:25

## 2020-01-22 RX ADMIN — INSULIN LISPRO 9 UNITS: 100 INJECTION, SOLUTION INTRAVENOUS; SUBCUTANEOUS at 09:07

## 2020-01-22 RX ADMIN — INSULIN LISPRO 5 UNITS: 100 INJECTION, SOLUTION INTRAVENOUS; SUBCUTANEOUS at 00:20

## 2020-01-22 RX ADMIN — TRAMADOL HYDROCHLORIDE 50 MG: 50 TABLET, FILM COATED ORAL at 09:06

## 2020-01-22 RX ADMIN — TRAMADOL HYDROCHLORIDE 50 MG: 50 TABLET, FILM COATED ORAL at 00:20

## 2020-01-22 RX ADMIN — LORAZEPAM 0.5 MG: 0.5 TABLET ORAL at 00:20

## 2020-01-22 RX ADMIN — INSULIN GLARGINE 10 UNITS: 100 INJECTION, SOLUTION SUBCUTANEOUS at 00:24

## 2020-01-22 RX ADMIN — MIDODRINE HYDROCHLORIDE 15 MG: 10 TABLET ORAL at 09:06

## 2020-01-22 RX ADMIN — CIPROFLOXACIN 250 MG: 250 TABLET, FILM COATED ORAL at 09:06

## 2020-01-22 RX ADMIN — OCTREOTIDE ACETATE 100 MCG: 100 INJECTION, SOLUTION INTRAVENOUS; SUBCUTANEOUS at 06:42

## 2020-01-22 RX ADMIN — SERTRALINE 50 MG: 50 TABLET, FILM COATED ORAL at 09:06

## 2020-01-22 RX ADMIN — LEVOTHYROXINE SODIUM 300 MCG: 150 TABLET ORAL at 06:42

## 2020-01-22 RX ADMIN — LACTULOSE 20 G: 20 SOLUTION ORAL at 00:20

## 2020-01-22 ASSESSMENT — PAIN SCALES - WONG BAKER: WONGBAKER_NUMERICALRESPONSE: 0

## 2020-01-22 ASSESSMENT — PAIN SCALES - GENERAL
PAINLEVEL_OUTOF10: 7
PAINLEVEL_OUTOF10: 5
PAINLEVEL_OUTOF10: 7
PAINLEVEL_OUTOF10: 0

## 2020-01-22 ASSESSMENT — PAIN DESCRIPTION - FREQUENCY: FREQUENCY: INTERMITTENT

## 2020-01-22 ASSESSMENT — PAIN DESCRIPTION - ONSET: ONSET: AWAKENED FROM SLEEP

## 2020-01-22 ASSESSMENT — PAIN - FUNCTIONAL ASSESSMENT: PAIN_FUNCTIONAL_ASSESSMENT: PREVENTS OR INTERFERES WITH MANY ACTIVE NOT PASSIVE ACTIVITIES

## 2020-01-22 ASSESSMENT — PAIN DESCRIPTION - LOCATION: LOCATION: ABDOMEN

## 2020-01-22 ASSESSMENT — PAIN DESCRIPTION - DESCRIPTORS: DESCRIPTORS: ACHING;DULL

## 2020-01-22 ASSESSMENT — PAIN DESCRIPTION - ORIENTATION: ORIENTATION: RIGHT

## 2020-01-22 ASSESSMENT — PAIN DESCRIPTION - PAIN TYPE: TYPE: ACUTE PAIN

## 2020-01-22 ASSESSMENT — PAIN DESCRIPTION - PROGRESSION: CLINICAL_PROGRESSION: RAPIDLY WORSENING

## 2020-01-22 NOTE — CARE COORDINATION
Patient is planned for discharge to [facility] with [facility] to sign on. Contract from Hospice has been delivered to [facility] [address], per [address]. SS working on transport. Electronically signed by Jamir Ramirez RN on 1/22/20 at 9:16 AM    Orders needed for Pleurx drain care at SNF. Perfect serve message sent to Dr. Lianna Mariano. She states order is written on script, Hospice to fax to [facility]. Robyn Cruz RN verifies Pleurx drain supplies will be delivered by [facility] to [facility]. Electronically signed by Jamir Ramirez RN on 1/22/20 at 9:33 AM

## 2020-01-22 NOTE — CARE COORDINATION
1/22/20, 11:44 AM    DISCHARGE PLANNING EVALUATION    SW received phone call from [facility] with [facility], they have note received AVS yet, pt is en-route to facility.   SW faxed AVS.

## 2020-01-22 NOTE — PROGRESS NOTES
Report received from primary RN    Electronically signed by Andrew RODRIGUEZ/KATARZYNA on 1/22/2020 at 8:37 AM

## 2020-01-24 ENCOUNTER — HOSPITAL ENCOUNTER (OUTPATIENT)
Dept: NURSING | Age: 60
End: 2020-01-24
Payer: MEDICAID

## 2020-02-06 ENCOUNTER — TELEPHONE (OUTPATIENT)
Dept: NEPHROLOGY | Age: 60
End: 2020-02-06

## (undated) DEVICE — SYRINGE, 10ML SALINE IN 10ML: Brand: MEDLINE

## (undated) DEVICE — SOLUTION IV 1000ML 0.45% SOD CHL PH 5 INJ USP VIAFLX PLAS

## (undated) DEVICE — SET ADMIN 25ML L117IN PMP MOD CK VLV RLER CLMP 2 SMRTSITE